# Patient Record
Sex: MALE | Race: WHITE | URBAN - METROPOLITAN AREA
[De-identification: names, ages, dates, MRNs, and addresses within clinical notes are randomized per-mention and may not be internally consistent; named-entity substitution may affect disease eponyms.]

---

## 2021-01-01 ENCOUNTER — APPOINTMENT (OUTPATIENT)
Dept: GENERAL RADIOLOGY | Age: 56
DRG: 207 | End: 2021-01-01
Payer: COMMERCIAL

## 2021-01-01 ENCOUNTER — CLINICAL DOCUMENTATION (OUTPATIENT)
Dept: PALLATIVE CARE | Age: 56
End: 2021-01-01

## 2021-01-01 ENCOUNTER — APPOINTMENT (OUTPATIENT)
Dept: ULTRASOUND IMAGING | Age: 56
DRG: 207 | End: 2021-01-01
Payer: COMMERCIAL

## 2021-01-01 ENCOUNTER — HOSPITAL ENCOUNTER (INPATIENT)
Age: 56
LOS: 24 days | DRG: 207 | End: 2021-09-03
Attending: INTERNAL MEDICINE
Payer: COMMERCIAL

## 2021-01-01 ENCOUNTER — APPOINTMENT (OUTPATIENT)
Dept: CT IMAGING | Age: 56
DRG: 207 | End: 2021-01-01
Payer: COMMERCIAL

## 2021-01-01 VITALS
TEMPERATURE: 100.4 F | BODY MASS INDEX: 33.5 KG/M2 | RESPIRATION RATE: 20 BRPM | HEART RATE: 90 BPM | OXYGEN SATURATION: 99 % | HEIGHT: 72 IN | WEIGHT: 247.36 LBS | SYSTOLIC BLOOD PRESSURE: 107 MMHG | DIASTOLIC BLOOD PRESSURE: 56 MMHG

## 2021-01-01 DIAGNOSIS — E11.9 NEW ONSET TYPE 2 DIABETES MELLITUS (HCC): ICD-10-CM

## 2021-01-01 DIAGNOSIS — R09.02 HYPOXIA: Primary | ICD-10-CM

## 2021-01-01 DIAGNOSIS — U07.1 COVID-19: ICD-10-CM

## 2021-01-01 DIAGNOSIS — J96.01 ACUTE RESPIRATORY FAILURE WITH HYPOXIA (HCC): ICD-10-CM

## 2021-01-01 LAB
ALBUMIN SERPL-MCNC: 2.1 G/DL (ref 3.5–4.6)
ALBUMIN SERPL-MCNC: 2.2 G/DL (ref 3.5–4.6)
ALBUMIN SERPL-MCNC: 2.2 G/DL (ref 3.5–4.6)
ALBUMIN SERPL-MCNC: 2.3 G/DL (ref 3.5–4.6)
ALBUMIN SERPL-MCNC: 2.3 G/DL (ref 3.5–4.6)
ALBUMIN SERPL-MCNC: 2.4 G/DL (ref 3.5–4.6)
ALBUMIN SERPL-MCNC: 2.4 G/DL (ref 3.5–4.6)
ALBUMIN SERPL-MCNC: 2.5 G/DL (ref 3.5–4.6)
ALBUMIN SERPL-MCNC: 2.5 G/DL (ref 3.5–4.6)
ALBUMIN SERPL-MCNC: 2.7 G/DL (ref 3.5–4.6)
ALBUMIN SERPL-MCNC: 2.8 G/DL (ref 3.5–4.6)
ALBUMIN SERPL-MCNC: 2.9 G/DL (ref 3.5–4.6)
ALBUMIN SERPL-MCNC: 3 G/DL (ref 3.5–4.6)
ALBUMIN SERPL-MCNC: 3.1 G/DL (ref 3.5–4.6)
ALBUMIN SERPL-MCNC: 3.1 G/DL (ref 3.5–4.6)
ALBUMIN SERPL-MCNC: 3.2 G/DL (ref 3.5–4.6)
ALBUMIN SERPL-MCNC: 3.2 G/DL (ref 3.5–4.6)
ALBUMIN SERPL-MCNC: 3.3 G/DL (ref 3.5–4.6)
ALBUMIN SERPL-MCNC: 3.4 G/DL (ref 3.5–4.6)
ALBUMIN SERPL-MCNC: 3.7 G/DL (ref 3.5–4.6)
ALP BLD-CCNC: 105 U/L (ref 35–104)
ALP BLD-CCNC: 106 U/L (ref 35–104)
ALP BLD-CCNC: 107 U/L (ref 35–104)
ALP BLD-CCNC: 107 U/L (ref 35–104)
ALP BLD-CCNC: 109 U/L (ref 35–104)
ALP BLD-CCNC: 109 U/L (ref 35–104)
ALP BLD-CCNC: 110 U/L (ref 35–104)
ALP BLD-CCNC: 111 U/L (ref 35–104)
ALP BLD-CCNC: 114 U/L (ref 35–104)
ALP BLD-CCNC: 118 U/L (ref 35–104)
ALP BLD-CCNC: 121 U/L (ref 35–104)
ALP BLD-CCNC: 124 U/L (ref 35–104)
ALP BLD-CCNC: 125 U/L (ref 35–104)
ALP BLD-CCNC: 129 U/L (ref 35–104)
ALP BLD-CCNC: 133 U/L (ref 35–104)
ALP BLD-CCNC: 137 U/L (ref 35–104)
ALP BLD-CCNC: 146 U/L (ref 35–104)
ALP BLD-CCNC: 149 U/L (ref 35–104)
ALP BLD-CCNC: 150 U/L (ref 35–104)
ALP BLD-CCNC: 170 U/L (ref 35–104)
ALP BLD-CCNC: 183 U/L (ref 35–104)
ALP BLD-CCNC: 190 U/L (ref 35–104)
ALP BLD-CCNC: 218 U/L (ref 35–104)
ALP BLD-CCNC: 230 U/L (ref 35–104)
ALP BLD-CCNC: 90 U/L (ref 35–104)
ALT SERPL-CCNC: 13 U/L (ref 0–41)
ALT SERPL-CCNC: 14 U/L (ref 0–41)
ALT SERPL-CCNC: 15 U/L (ref 0–41)
ALT SERPL-CCNC: 15 U/L (ref 0–41)
ALT SERPL-CCNC: 16 U/L (ref 0–41)
ALT SERPL-CCNC: 16 U/L (ref 0–41)
ALT SERPL-CCNC: 20 U/L (ref 0–41)
ALT SERPL-CCNC: 21 U/L (ref 0–41)
ALT SERPL-CCNC: 23 U/L (ref 0–41)
ALT SERPL-CCNC: 24 U/L (ref 0–41)
ALT SERPL-CCNC: 25 U/L (ref 0–41)
ALT SERPL-CCNC: 26 U/L (ref 0–41)
ALT SERPL-CCNC: 27 U/L (ref 0–41)
ALT SERPL-CCNC: 27 U/L (ref 0–41)
ALT SERPL-CCNC: 28 U/L (ref 0–41)
ALT SERPL-CCNC: 29 U/L (ref 0–41)
ALT SERPL-CCNC: 29 U/L (ref 0–41)
ALT SERPL-CCNC: 31 U/L (ref 0–41)
ALT SERPL-CCNC: 32 U/L (ref 0–41)
ALT SERPL-CCNC: 34 U/L (ref 0–41)
ALT SERPL-CCNC: 35 U/L (ref 0–41)
ANION GAP SERPL CALCULATED.3IONS-SCNC: 10 MEQ/L (ref 9–15)
ANION GAP SERPL CALCULATED.3IONS-SCNC: 10 MEQ/L (ref 9–15)
ANION GAP SERPL CALCULATED.3IONS-SCNC: 11 MEQ/L (ref 9–15)
ANION GAP SERPL CALCULATED.3IONS-SCNC: 11 MEQ/L (ref 9–15)
ANION GAP SERPL CALCULATED.3IONS-SCNC: 12 MEQ/L (ref 9–15)
ANION GAP SERPL CALCULATED.3IONS-SCNC: 13 MEQ/L (ref 9–15)
ANION GAP SERPL CALCULATED.3IONS-SCNC: 14 MEQ/L (ref 9–15)
ANION GAP SERPL CALCULATED.3IONS-SCNC: 15 MEQ/L (ref 9–15)
ANION GAP SERPL CALCULATED.3IONS-SCNC: 16 MEQ/L (ref 9–15)
ANION GAP SERPL CALCULATED.3IONS-SCNC: 17 MEQ/L (ref 9–15)
ANION GAP SERPL CALCULATED.3IONS-SCNC: 24 MEQ/L (ref 9–15)
ANION GAP SERPL CALCULATED.3IONS-SCNC: 27 MEQ/L (ref 9–15)
ANION GAP SERPL CALCULATED.3IONS-SCNC: 8 MEQ/L (ref 9–15)
ANION GAP SERPL CALCULATED.3IONS-SCNC: 9 MEQ/L (ref 9–15)
AST SERPL-CCNC: 11 U/L (ref 0–40)
AST SERPL-CCNC: 14 U/L (ref 0–40)
AST SERPL-CCNC: 17 U/L (ref 0–40)
AST SERPL-CCNC: 20 U/L (ref 0–40)
AST SERPL-CCNC: 20 U/L (ref 0–40)
AST SERPL-CCNC: 21 U/L (ref 0–40)
AST SERPL-CCNC: 21 U/L (ref 0–40)
AST SERPL-CCNC: 24 U/L (ref 0–40)
AST SERPL-CCNC: 24 U/L (ref 0–40)
AST SERPL-CCNC: 25 U/L (ref 0–40)
AST SERPL-CCNC: 25 U/L (ref 0–40)
AST SERPL-CCNC: 26 U/L (ref 0–40)
AST SERPL-CCNC: 28 U/L (ref 0–40)
AST SERPL-CCNC: 33 U/L (ref 0–40)
AST SERPL-CCNC: 33 U/L (ref 0–40)
AST SERPL-CCNC: 34 U/L (ref 0–40)
AST SERPL-CCNC: 35 U/L (ref 0–40)
AST SERPL-CCNC: 36 U/L (ref 0–40)
AST SERPL-CCNC: 37 U/L (ref 0–40)
AST SERPL-CCNC: 38 U/L (ref 0–40)
AST SERPL-CCNC: 40 U/L (ref 0–40)
BASE EXCESS ARTERIAL: -18 (ref -3–3)
BASE EXCESS ARTERIAL: -19 (ref -3–3)
BASE EXCESS ARTERIAL: 0 (ref -3–3)
BASE EXCESS ARTERIAL: 0 (ref -3–3)
BASE EXCESS ARTERIAL: 10 (ref -3–3)
BASE EXCESS ARTERIAL: 11 (ref -3–3)
BASE EXCESS ARTERIAL: 3 (ref -3–3)
BASE EXCESS ARTERIAL: 4 (ref -3–3)
BASE EXCESS ARTERIAL: 5 (ref -3–3)
BASE EXCESS ARTERIAL: 6 (ref -3–3)
BASE EXCESS ARTERIAL: 6 (ref -3–3)
BASE EXCESS ARTERIAL: 7 (ref -3–3)
BASE EXCESS ARTERIAL: 7 (ref -3–3)
BASE EXCESS ARTERIAL: 8 (ref -3–3)
BASE EXCESS ARTERIAL: 9 (ref -3–3)
BASOPHILS ABSOLUTE: 0 K/UL (ref 0–0.2)
BASOPHILS ABSOLUTE: 0.1 K/UL (ref 0–0.2)
BASOPHILS ABSOLUTE: 0.2 K/UL (ref 0–0.2)
BASOPHILS RELATIVE PERCENT: 0.1 %
BASOPHILS RELATIVE PERCENT: 0.2 %
BASOPHILS RELATIVE PERCENT: 0.3 %
BASOPHILS RELATIVE PERCENT: 0.4 %
BASOPHILS RELATIVE PERCENT: 0.5 %
BASOPHILS RELATIVE PERCENT: 0.6 %
BASOPHILS RELATIVE PERCENT: 0.7 %
BASOPHILS RELATIVE PERCENT: 0.9 %
BASOPHILS RELATIVE PERCENT: 0.9 %
BASOPHILS RELATIVE PERCENT: 1 %
BASOPHILS RELATIVE PERCENT: 1.3 %
BILIRUB SERPL-MCNC: 0.3 MG/DL (ref 0.2–0.7)
BILIRUB SERPL-MCNC: 0.4 MG/DL (ref 0.2–0.7)
BILIRUB SERPL-MCNC: 0.5 MG/DL (ref 0.2–0.7)
BILIRUB SERPL-MCNC: <0.2 MG/DL (ref 0.2–0.7)
BILIRUB SERPL-MCNC: <0.2 MG/DL (ref 0.2–0.7)
BLOOD CULTURE, ROUTINE: NORMAL
BUN BLDV-MCNC: 13 MG/DL (ref 6–20)
BUN BLDV-MCNC: 15 MG/DL (ref 6–20)
BUN BLDV-MCNC: 16 MG/DL (ref 6–20)
BUN BLDV-MCNC: 16 MG/DL (ref 6–20)
BUN BLDV-MCNC: 17 MG/DL (ref 6–20)
BUN BLDV-MCNC: 18 MG/DL (ref 6–20)
BUN BLDV-MCNC: 19 MG/DL (ref 6–20)
BUN BLDV-MCNC: 20 MG/DL (ref 6–20)
BUN BLDV-MCNC: 20 MG/DL (ref 6–20)
BUN BLDV-MCNC: 21 MG/DL (ref 6–20)
BUN BLDV-MCNC: 21 MG/DL (ref 6–20)
BUN BLDV-MCNC: 22 MG/DL (ref 6–20)
BUN BLDV-MCNC: 23 MG/DL (ref 6–20)
BUN BLDV-MCNC: 24 MG/DL (ref 6–20)
BUN BLDV-MCNC: 29 MG/DL (ref 6–20)
BUN BLDV-MCNC: 31 MG/DL (ref 6–20)
BUN BLDV-MCNC: 31 MG/DL (ref 6–20)
BUN BLDV-MCNC: 35 MG/DL (ref 6–20)
BUN BLDV-MCNC: 37 MG/DL (ref 6–20)
BUN BLDV-MCNC: 39 MG/DL (ref 6–20)
BUN BLDV-MCNC: 40 MG/DL (ref 6–20)
C-REACTIVE PROTEIN: 266 MG/L (ref 0–5)
CALCIUM IONIZED: 1.09 MMOL/L (ref 1.12–1.32)
CALCIUM IONIZED: 1.09 MMOL/L (ref 1.12–1.32)
CALCIUM IONIZED: 1.11 MMOL/L (ref 1.12–1.32)
CALCIUM IONIZED: 1.13 MMOL/L (ref 1.12–1.32)
CALCIUM IONIZED: 1.13 MMOL/L (ref 1.12–1.32)
CALCIUM IONIZED: 1.14 MMOL/L (ref 1.12–1.32)
CALCIUM IONIZED: 1.14 MMOL/L (ref 1.12–1.32)
CALCIUM IONIZED: 1.15 MMOL/L (ref 1.12–1.32)
CALCIUM IONIZED: 1.16 MMOL/L (ref 1.12–1.32)
CALCIUM IONIZED: 1.16 MMOL/L (ref 1.12–1.32)
CALCIUM IONIZED: 1.17 MMOL/L (ref 1.12–1.32)
CALCIUM IONIZED: 1.18 MMOL/L (ref 1.12–1.32)
CALCIUM IONIZED: 1.18 MMOL/L (ref 1.12–1.32)
CALCIUM IONIZED: 1.19 MMOL/L (ref 1.12–1.32)
CALCIUM IONIZED: 1.2 MMOL/L (ref 1.12–1.32)
CALCIUM IONIZED: 1.21 MMOL/L (ref 1.12–1.32)
CALCIUM IONIZED: 1.23 MMOL/L (ref 1.12–1.32)
CALCIUM IONIZED: 1.23 MMOL/L (ref 1.12–1.32)
CALCIUM IONIZED: 1.24 MMOL/L (ref 1.12–1.32)
CALCIUM IONIZED: 1.24 MMOL/L (ref 1.12–1.32)
CALCIUM IONIZED: 1.25 MMOL/L (ref 1.12–1.32)
CALCIUM IONIZED: 1.26 MMOL/L (ref 1.12–1.32)
CALCIUM IONIZED: 1.26 MMOL/L (ref 1.12–1.32)
CALCIUM IONIZED: 1.27 MMOL/L (ref 1.12–1.32)
CALCIUM IONIZED: 1.32 MMOL/L (ref 1.12–1.32)
CALCIUM SERPL-MCNC: 8.2 MG/DL (ref 8.5–9.9)
CALCIUM SERPL-MCNC: 8.3 MG/DL (ref 8.5–9.9)
CALCIUM SERPL-MCNC: 8.4 MG/DL (ref 8.5–9.9)
CALCIUM SERPL-MCNC: 8.5 MG/DL (ref 8.5–9.9)
CALCIUM SERPL-MCNC: 8.6 MG/DL (ref 8.5–9.9)
CALCIUM SERPL-MCNC: 8.6 MG/DL (ref 8.5–9.9)
CALCIUM SERPL-MCNC: 8.7 MG/DL (ref 8.5–9.9)
CALCIUM SERPL-MCNC: 8.8 MG/DL (ref 8.5–9.9)
CALCIUM SERPL-MCNC: 8.8 MG/DL (ref 8.5–9.9)
CALCIUM SERPL-MCNC: 8.9 MG/DL (ref 8.5–9.9)
CALCIUM SERPL-MCNC: 9 MG/DL (ref 8.5–9.9)
CALCIUM SERPL-MCNC: 9.4 MG/DL (ref 8.5–9.9)
CALCIUM SERPL-MCNC: 9.4 MG/DL (ref 8.5–9.9)
CHLORIDE BLD-SCNC: 100 MEQ/L (ref 95–107)
CHLORIDE BLD-SCNC: 101 MEQ/L (ref 95–107)
CHLORIDE BLD-SCNC: 101 MEQ/L (ref 95–107)
CHLORIDE BLD-SCNC: 102 MEQ/L (ref 95–107)
CHLORIDE BLD-SCNC: 104 MEQ/L (ref 95–107)
CHLORIDE BLD-SCNC: 104 MEQ/L (ref 95–107)
CHLORIDE BLD-SCNC: 106 MEQ/L (ref 95–107)
CHLORIDE BLD-SCNC: 108 MEQ/L (ref 95–107)
CHLORIDE BLD-SCNC: 112 MEQ/L (ref 95–107)
CHLORIDE BLD-SCNC: 112 MEQ/L (ref 95–107)
CHLORIDE BLD-SCNC: 93 MEQ/L (ref 95–107)
CHLORIDE BLD-SCNC: 96 MEQ/L (ref 95–107)
CHLORIDE BLD-SCNC: 97 MEQ/L (ref 95–107)
CHLORIDE BLD-SCNC: 97 MEQ/L (ref 95–107)
CHLORIDE BLD-SCNC: 98 MEQ/L (ref 95–107)
CHLORIDE BLD-SCNC: 99 MEQ/L (ref 95–107)
CO2: 11 MEQ/L (ref 20–31)
CO2: 11 MEQ/L (ref 20–31)
CO2: 18 MEQ/L (ref 20–31)
CO2: 19 MEQ/L (ref 20–31)
CO2: 22 MEQ/L (ref 20–31)
CO2: 23 MEQ/L (ref 20–31)
CO2: 24 MEQ/L (ref 20–31)
CO2: 24 MEQ/L (ref 20–31)
CO2: 25 MEQ/L (ref 20–31)
CO2: 27 MEQ/L (ref 20–31)
CO2: 27 MEQ/L (ref 20–31)
CO2: 29 MEQ/L (ref 20–31)
CO2: 31 MEQ/L (ref 20–31)
CO2: 32 MEQ/L (ref 20–31)
CO2: 32 MEQ/L (ref 20–31)
CO2: 33 MEQ/L (ref 20–31)
CO2: 33 MEQ/L (ref 20–31)
CO2: 35 MEQ/L (ref 20–31)
CREAT SERPL-MCNC: 0.55 MG/DL (ref 0.7–1.2)
CREAT SERPL-MCNC: 0.56 MG/DL (ref 0.7–1.2)
CREAT SERPL-MCNC: 0.57 MG/DL (ref 0.7–1.2)
CREAT SERPL-MCNC: 0.59 MG/DL (ref 0.7–1.2)
CREAT SERPL-MCNC: 0.63 MG/DL (ref 0.7–1.2)
CREAT SERPL-MCNC: 0.64 MG/DL (ref 0.7–1.2)
CREAT SERPL-MCNC: 0.72 MG/DL (ref 0.7–1.2)
CREAT SERPL-MCNC: 0.73 MG/DL (ref 0.7–1.2)
CREAT SERPL-MCNC: 0.73 MG/DL (ref 0.7–1.2)
CREAT SERPL-MCNC: 0.74 MG/DL (ref 0.7–1.2)
CREAT SERPL-MCNC: 0.74 MG/DL (ref 0.7–1.2)
CREAT SERPL-MCNC: 0.75 MG/DL (ref 0.7–1.2)
CREAT SERPL-MCNC: 0.75 MG/DL (ref 0.7–1.2)
CREAT SERPL-MCNC: 0.76 MG/DL (ref 0.7–1.2)
CREAT SERPL-MCNC: 0.8 MG/DL (ref 0.7–1.2)
CREAT SERPL-MCNC: 0.81 MG/DL (ref 0.7–1.2)
CREAT SERPL-MCNC: 0.85 MG/DL (ref 0.7–1.2)
CREAT SERPL-MCNC: 0.85 MG/DL (ref 0.7–1.2)
CREAT SERPL-MCNC: 0.87 MG/DL (ref 0.7–1.2)
CREAT SERPL-MCNC: 0.88 MG/DL (ref 0.7–1.2)
CREAT SERPL-MCNC: 0.97 MG/DL (ref 0.7–1.2)
CREAT SERPL-MCNC: 0.97 MG/DL (ref 0.7–1.2)
CREAT SERPL-MCNC: 0.98 MG/DL (ref 0.7–1.2)
CREAT SERPL-MCNC: 0.98 MG/DL (ref 0.7–1.2)
CREAT SERPL-MCNC: 1.06 MG/DL (ref 0.7–1.2)
CREAT SERPL-MCNC: 1.16 MG/DL (ref 0.7–1.2)
CREAT SERPL-MCNC: 1.34 MG/DL (ref 0.7–1.2)
CULTURE, BLOOD 2: ABNORMAL
CULTURE, BLOOD 2: NORMAL
CULTURE, BLOOD 2: NORMAL
CULTURE, RESPIRATORY: ABNORMAL
D DIMER: 0.91 MG/L FEU (ref 0–0.5)
EKG ATRIAL RATE: 110 BPM
EKG ATRIAL RATE: 42 BPM
EKG P AXIS: 29 DEGREES
EKG P AXIS: 42 DEGREES
EKG P-R INTERVAL: 168 MS
EKG P-R INTERVAL: 168 MS
EKG Q-T INTERVAL: 350 MS
EKG Q-T INTERVAL: 532 MS
EKG QRS DURATION: 92 MS
EKG QRS DURATION: 98 MS
EKG QTC CALCULATION (BAZETT): 444 MS
EKG QTC CALCULATION (BAZETT): 473 MS
EKG R AXIS: -18 DEGREES
EKG R AXIS: 11 DEGREES
EKG T AXIS: -10 DEGREES
EKG T AXIS: 57 DEGREES
EKG VENTRICULAR RATE: 110 BPM
EKG VENTRICULAR RATE: 42 BPM
EOSINOPHILS ABSOLUTE: 0 K/UL (ref 0–0.7)
EOSINOPHILS ABSOLUTE: 0.1 K/UL (ref 0–0.7)
EOSINOPHILS ABSOLUTE: 0.2 K/UL (ref 0–0.7)
EOSINOPHILS ABSOLUTE: 0.3 K/UL (ref 0–0.7)
EOSINOPHILS ABSOLUTE: 0.4 K/UL (ref 0–0.7)
EOSINOPHILS RELATIVE PERCENT: 0 %
EOSINOPHILS RELATIVE PERCENT: 0.1 %
EOSINOPHILS RELATIVE PERCENT: 0.2 %
EOSINOPHILS RELATIVE PERCENT: 0.3 %
EOSINOPHILS RELATIVE PERCENT: 0.4 %
EOSINOPHILS RELATIVE PERCENT: 0.5 %
EOSINOPHILS RELATIVE PERCENT: 0.5 %
EOSINOPHILS RELATIVE PERCENT: 0.6 %
EOSINOPHILS RELATIVE PERCENT: 1.6 %
EOSINOPHILS RELATIVE PERCENT: 2 %
EOSINOPHILS RELATIVE PERCENT: 2.2 %
EOSINOPHILS RELATIVE PERCENT: 2.7 %
EOSINOPHILS RELATIVE PERCENT: 3.4 %
EOSINOPHILS RELATIVE PERCENT: 4 %
EOSINOPHILS RELATIVE PERCENT: 4.3 %
EOSINOPHILS RELATIVE PERCENT: 5.4 %
EOSINOPHILS RELATIVE PERCENT: 6 %
FERRITIN: 1011 NG/ML (ref 30–400)
FIBRINOGEN: 804 MG/DL (ref 235–507)
GFR AFRICAN AMERICAN: 47
GFR AFRICAN AMERICAN: 54
GFR AFRICAN AMERICAN: 58
GFR AFRICAN AMERICAN: 58
GFR AFRICAN AMERICAN: >60
GFR NON-AFRICAN AMERICAN: 39
GFR NON-AFRICAN AMERICAN: 45
GFR NON-AFRICAN AMERICAN: 48
GFR NON-AFRICAN AMERICAN: 48
GFR NON-AFRICAN AMERICAN: 52
GFR NON-AFRICAN AMERICAN: 54.8
GFR NON-AFRICAN AMERICAN: 57
GFR NON-AFRICAN AMERICAN: >60
GLOBULIN: 2.6 G/DL (ref 2.3–3.5)
GLOBULIN: 2.8 G/DL (ref 2.3–3.5)
GLOBULIN: 2.8 G/DL (ref 2.3–3.5)
GLOBULIN: 2.9 G/DL (ref 2.3–3.5)
GLOBULIN: 3 G/DL (ref 2.3–3.5)
GLOBULIN: 3.1 G/DL (ref 2.3–3.5)
GLOBULIN: 3.2 G/DL (ref 2.3–3.5)
GLOBULIN: 3.3 G/DL (ref 2.3–3.5)
GLOBULIN: 3.4 G/DL (ref 2.3–3.5)
GLOBULIN: 3.4 G/DL (ref 2.3–3.5)
GLOBULIN: 3.5 G/DL (ref 2.3–3.5)
GLOBULIN: 3.5 G/DL (ref 2.3–3.5)
GLOBULIN: 3.6 G/DL (ref 2.3–3.5)
GLOBULIN: 3.7 G/DL (ref 2.3–3.5)
GLOBULIN: 3.7 G/DL (ref 2.3–3.5)
GLOBULIN: 3.8 G/DL (ref 2.3–3.5)
GLOBULIN: 3.8 G/DL (ref 2.3–3.5)
GLOBULIN: 3.9 G/DL (ref 2.3–3.5)
GLUCOSE BLD-MCNC: 100 MG/DL (ref 70–99)
GLUCOSE BLD-MCNC: 101 MG/DL (ref 60–115)
GLUCOSE BLD-MCNC: 102 MG/DL (ref 60–115)
GLUCOSE BLD-MCNC: 102 MG/DL (ref 60–115)
GLUCOSE BLD-MCNC: 106 MG/DL (ref 60–115)
GLUCOSE BLD-MCNC: 108 MG/DL (ref 60–115)
GLUCOSE BLD-MCNC: 109 MG/DL (ref 60–115)
GLUCOSE BLD-MCNC: 110 MG/DL (ref 60–115)
GLUCOSE BLD-MCNC: 110 MG/DL (ref 70–99)
GLUCOSE BLD-MCNC: 110 MG/DL (ref 70–99)
GLUCOSE BLD-MCNC: 111 MG/DL (ref 60–115)
GLUCOSE BLD-MCNC: 111 MG/DL (ref 60–115)
GLUCOSE BLD-MCNC: 112 MG/DL (ref 60–115)
GLUCOSE BLD-MCNC: 113 MG/DL (ref 70–99)
GLUCOSE BLD-MCNC: 114 MG/DL (ref 60–115)
GLUCOSE BLD-MCNC: 114 MG/DL (ref 60–115)
GLUCOSE BLD-MCNC: 118 MG/DL (ref 60–115)
GLUCOSE BLD-MCNC: 120 MG/DL (ref 60–115)
GLUCOSE BLD-MCNC: 124 MG/DL (ref 60–115)
GLUCOSE BLD-MCNC: 128 MG/DL (ref 60–115)
GLUCOSE BLD-MCNC: 130 MG/DL (ref 60–115)
GLUCOSE BLD-MCNC: 130 MG/DL (ref 70–99)
GLUCOSE BLD-MCNC: 133 MG/DL (ref 60–115)
GLUCOSE BLD-MCNC: 133 MG/DL (ref 60–115)
GLUCOSE BLD-MCNC: 134 MG/DL (ref 70–99)
GLUCOSE BLD-MCNC: 135 MG/DL (ref 60–115)
GLUCOSE BLD-MCNC: 138 MG/DL (ref 60–115)
GLUCOSE BLD-MCNC: 139 MG/DL (ref 60–115)
GLUCOSE BLD-MCNC: 145 MG/DL (ref 60–115)
GLUCOSE BLD-MCNC: 147 MG/DL (ref 60–115)
GLUCOSE BLD-MCNC: 149 MG/DL (ref 60–115)
GLUCOSE BLD-MCNC: 149 MG/DL (ref 60–115)
GLUCOSE BLD-MCNC: 151 MG/DL (ref 60–115)
GLUCOSE BLD-MCNC: 152 MG/DL (ref 60–115)
GLUCOSE BLD-MCNC: 155 MG/DL (ref 60–115)
GLUCOSE BLD-MCNC: 155 MG/DL (ref 60–115)
GLUCOSE BLD-MCNC: 158 MG/DL (ref 60–115)
GLUCOSE BLD-MCNC: 160 MG/DL (ref 70–99)
GLUCOSE BLD-MCNC: 161 MG/DL (ref 60–115)
GLUCOSE BLD-MCNC: 161 MG/DL (ref 60–115)
GLUCOSE BLD-MCNC: 163 MG/DL (ref 70–99)
GLUCOSE BLD-MCNC: 164 MG/DL (ref 60–115)
GLUCOSE BLD-MCNC: 165 MG/DL (ref 60–115)
GLUCOSE BLD-MCNC: 165 MG/DL (ref 60–115)
GLUCOSE BLD-MCNC: 166 MG/DL (ref 60–115)
GLUCOSE BLD-MCNC: 167 MG/DL (ref 60–115)
GLUCOSE BLD-MCNC: 167 MG/DL (ref 60–115)
GLUCOSE BLD-MCNC: 170 MG/DL (ref 60–115)
GLUCOSE BLD-MCNC: 170 MG/DL (ref 70–99)
GLUCOSE BLD-MCNC: 171 MG/DL (ref 60–115)
GLUCOSE BLD-MCNC: 172 MG/DL (ref 70–99)
GLUCOSE BLD-MCNC: 174 MG/DL (ref 60–115)
GLUCOSE BLD-MCNC: 176 MG/DL (ref 60–115)
GLUCOSE BLD-MCNC: 178 MG/DL (ref 60–115)
GLUCOSE BLD-MCNC: 178 MG/DL (ref 60–115)
GLUCOSE BLD-MCNC: 180 MG/DL (ref 60–115)
GLUCOSE BLD-MCNC: 180 MG/DL (ref 70–99)
GLUCOSE BLD-MCNC: 181 MG/DL (ref 60–115)
GLUCOSE BLD-MCNC: 182 MG/DL (ref 60–115)
GLUCOSE BLD-MCNC: 182 MG/DL (ref 60–115)
GLUCOSE BLD-MCNC: 183 MG/DL (ref 70–99)
GLUCOSE BLD-MCNC: 185 MG/DL (ref 60–115)
GLUCOSE BLD-MCNC: 186 MG/DL (ref 60–115)
GLUCOSE BLD-MCNC: 187 MG/DL (ref 60–115)
GLUCOSE BLD-MCNC: 188 MG/DL (ref 60–115)
GLUCOSE BLD-MCNC: 190 MG/DL (ref 60–115)
GLUCOSE BLD-MCNC: 190 MG/DL (ref 60–115)
GLUCOSE BLD-MCNC: 191 MG/DL (ref 60–115)
GLUCOSE BLD-MCNC: 203 MG/DL (ref 60–115)
GLUCOSE BLD-MCNC: 205 MG/DL (ref 60–115)
GLUCOSE BLD-MCNC: 206 MG/DL (ref 60–115)
GLUCOSE BLD-MCNC: 207 MG/DL (ref 60–115)
GLUCOSE BLD-MCNC: 209 MG/DL (ref 60–115)
GLUCOSE BLD-MCNC: 211 MG/DL (ref 60–115)
GLUCOSE BLD-MCNC: 211 MG/DL (ref 70–99)
GLUCOSE BLD-MCNC: 214 MG/DL (ref 60–115)
GLUCOSE BLD-MCNC: 218 MG/DL (ref 60–115)
GLUCOSE BLD-MCNC: 218 MG/DL (ref 60–115)
GLUCOSE BLD-MCNC: 219 MG/DL (ref 60–115)
GLUCOSE BLD-MCNC: 219 MG/DL (ref 60–115)
GLUCOSE BLD-MCNC: 220 MG/DL (ref 60–115)
GLUCOSE BLD-MCNC: 223 MG/DL (ref 60–115)
GLUCOSE BLD-MCNC: 225 MG/DL (ref 60–115)
GLUCOSE BLD-MCNC: 226 MG/DL (ref 60–115)
GLUCOSE BLD-MCNC: 227 MG/DL (ref 60–115)
GLUCOSE BLD-MCNC: 227 MG/DL (ref 60–115)
GLUCOSE BLD-MCNC: 229 MG/DL (ref 60–115)
GLUCOSE BLD-MCNC: 230 MG/DL (ref 60–115)
GLUCOSE BLD-MCNC: 231 MG/DL (ref 60–115)
GLUCOSE BLD-MCNC: 236 MG/DL (ref 70–99)
GLUCOSE BLD-MCNC: 238 MG/DL (ref 70–99)
GLUCOSE BLD-MCNC: 240 MG/DL (ref 60–115)
GLUCOSE BLD-MCNC: 242 MG/DL (ref 60–115)
GLUCOSE BLD-MCNC: 242 MG/DL (ref 60–115)
GLUCOSE BLD-MCNC: 243 MG/DL (ref 60–115)
GLUCOSE BLD-MCNC: 247 MG/DL (ref 60–115)
GLUCOSE BLD-MCNC: 248 MG/DL (ref 60–115)
GLUCOSE BLD-MCNC: 250 MG/DL (ref 60–115)
GLUCOSE BLD-MCNC: 250 MG/DL (ref 60–115)
GLUCOSE BLD-MCNC: 251 MG/DL (ref 60–115)
GLUCOSE BLD-MCNC: 251 MG/DL (ref 70–99)
GLUCOSE BLD-MCNC: 253 MG/DL (ref 60–115)
GLUCOSE BLD-MCNC: 256 MG/DL (ref 60–115)
GLUCOSE BLD-MCNC: 258 MG/DL (ref 60–115)
GLUCOSE BLD-MCNC: 259 MG/DL (ref 70–99)
GLUCOSE BLD-MCNC: 259 MG/DL (ref 70–99)
GLUCOSE BLD-MCNC: 260 MG/DL (ref 60–115)
GLUCOSE BLD-MCNC: 262 MG/DL (ref 60–115)
GLUCOSE BLD-MCNC: 265 MG/DL (ref 60–115)
GLUCOSE BLD-MCNC: 266 MG/DL (ref 60–115)
GLUCOSE BLD-MCNC: 267 MG/DL (ref 60–115)
GLUCOSE BLD-MCNC: 271 MG/DL (ref 60–115)
GLUCOSE BLD-MCNC: 273 MG/DL (ref 60–115)
GLUCOSE BLD-MCNC: 273 MG/DL (ref 60–115)
GLUCOSE BLD-MCNC: 274 MG/DL (ref 60–115)
GLUCOSE BLD-MCNC: 277 MG/DL (ref 60–115)
GLUCOSE BLD-MCNC: 277 MG/DL (ref 60–115)
GLUCOSE BLD-MCNC: 279 MG/DL (ref 60–115)
GLUCOSE BLD-MCNC: 279 MG/DL (ref 70–99)
GLUCOSE BLD-MCNC: 280 MG/DL (ref 70–99)
GLUCOSE BLD-MCNC: 284 MG/DL (ref 70–99)
GLUCOSE BLD-MCNC: 286 MG/DL (ref 60–115)
GLUCOSE BLD-MCNC: 286 MG/DL (ref 60–115)
GLUCOSE BLD-MCNC: 290 MG/DL (ref 60–115)
GLUCOSE BLD-MCNC: 292 MG/DL (ref 70–99)
GLUCOSE BLD-MCNC: 293 MG/DL (ref 70–99)
GLUCOSE BLD-MCNC: 297 MG/DL (ref 60–115)
GLUCOSE BLD-MCNC: 297 MG/DL (ref 60–115)
GLUCOSE BLD-MCNC: 300 MG/DL (ref 60–115)
GLUCOSE BLD-MCNC: 301 MG/DL (ref 60–115)
GLUCOSE BLD-MCNC: 301 MG/DL (ref 60–115)
GLUCOSE BLD-MCNC: 302 MG/DL (ref 60–115)
GLUCOSE BLD-MCNC: 302 MG/DL (ref 60–115)
GLUCOSE BLD-MCNC: 308 MG/DL (ref 60–115)
GLUCOSE BLD-MCNC: 308 MG/DL (ref 60–115)
GLUCOSE BLD-MCNC: 312 MG/DL (ref 60–115)
GLUCOSE BLD-MCNC: 314 MG/DL (ref 60–115)
GLUCOSE BLD-MCNC: 340 MG/DL (ref 60–115)
GLUCOSE BLD-MCNC: 343 MG/DL (ref 60–115)
GLUCOSE BLD-MCNC: 357 MG/DL (ref 60–115)
GLUCOSE BLD-MCNC: 366 MG/DL (ref 70–99)
GLUCOSE BLD-MCNC: 375 MG/DL (ref 60–115)
GLUCOSE BLD-MCNC: 378 MG/DL (ref 70–99)
GLUCOSE BLD-MCNC: 391 MG/DL (ref 60–115)
GLUCOSE BLD-MCNC: 53 MG/DL (ref 60–115)
GLUCOSE BLD-MCNC: 54 MG/DL (ref 60–115)
GLUCOSE BLD-MCNC: 62 MG/DL (ref 60–115)
GLUCOSE BLD-MCNC: 62 MG/DL (ref 60–115)
GLUCOSE BLD-MCNC: 82 MG/DL (ref 60–115)
GLUCOSE BLD-MCNC: 83 MG/DL (ref 60–115)
GLUCOSE BLD-MCNC: 87 MG/DL (ref 60–115)
GLUCOSE BLD-MCNC: 88 MG/DL (ref 60–115)
GLUCOSE BLD-MCNC: 88 MG/DL (ref 60–115)
GLUCOSE BLD-MCNC: 90 MG/DL (ref 70–99)
GLUCOSE BLD-MCNC: 93 MG/DL (ref 60–115)
GLUCOSE BLD-MCNC: 93 MG/DL (ref 60–115)
GLUCOSE BLD-MCNC: 96 MG/DL (ref 70–99)
GLUCOSE BLD-MCNC: 99 MG/DL (ref 60–115)
GRAM STAIN RESULT: ABNORMAL
GRAM STAIN RESULT: ABNORMAL
HBA1C MFR BLD: 13.7 % (ref 4.8–5.9)
HCO3 ARTERIAL: 26.7 MMOL/L (ref 21–29)
HCO3 ARTERIAL: 27.2 MMOL/L (ref 21–29)
HCO3 ARTERIAL: 27.7 MMOL/L (ref 21–29)
HCO3 ARTERIAL: 28.2 MMOL/L (ref 21–29)
HCO3 ARTERIAL: 28.2 MMOL/L (ref 21–29)
HCO3 ARTERIAL: 28.7 MMOL/L (ref 21–29)
HCO3 ARTERIAL: 29 MMOL/L (ref 21–29)
HCO3 ARTERIAL: 29.1 MMOL/L (ref 21–29)
HCO3 ARTERIAL: 29.2 MMOL/L (ref 21–29)
HCO3 ARTERIAL: 29.5 MMOL/L (ref 21–29)
HCO3 ARTERIAL: 29.8 MMOL/L (ref 21–29)
HCO3 ARTERIAL: 29.9 MMOL/L (ref 21–29)
HCO3 ARTERIAL: 29.9 MMOL/L (ref 21–29)
HCO3 ARTERIAL: 30.2 MMOL/L (ref 21–29)
HCO3 ARTERIAL: 30.6 MMOL/L (ref 21–29)
HCO3 ARTERIAL: 30.7 MMOL/L (ref 21–29)
HCO3 ARTERIAL: 31.5 MMOL/L (ref 21–29)
HCO3 ARTERIAL: 31.6 MMOL/L (ref 21–29)
HCO3 ARTERIAL: 32 MMOL/L (ref 21–29)
HCO3 ARTERIAL: 32.1 MMOL/L (ref 21–29)
HCO3 ARTERIAL: 32.1 MMOL/L (ref 21–29)
HCO3 ARTERIAL: 32.6 MMOL/L (ref 21–29)
HCO3 ARTERIAL: 32.8 MMOL/L (ref 21–29)
HCO3 ARTERIAL: 33.7 MMOL/L (ref 21–29)
HCO3 ARTERIAL: 33.8 MMOL/L (ref 21–29)
HCO3 ARTERIAL: 33.9 MMOL/L (ref 21–29)
HCO3 ARTERIAL: 34 MMOL/L (ref 21–29)
HCO3 ARTERIAL: 34 MMOL/L (ref 21–29)
HCO3 ARTERIAL: 34.1 MMOL/L (ref 21–29)
HCO3 ARTERIAL: 34.8 MMOL/L (ref 21–29)
HCO3 ARTERIAL: 34.9 MMOL/L (ref 21–29)
HCO3 ARTERIAL: 35.3 MMOL/L (ref 21–29)
HCO3 ARTERIAL: 39.1 MMOL/L (ref 21–29)
HCO3 ARTERIAL: 7.2 MMOL/L (ref 21–29)
HCO3 ARTERIAL: 8.7 MMOL/L (ref 21–29)
HCT VFR BLD CALC: 28.9 % (ref 42–52)
HCT VFR BLD CALC: 29.6 % (ref 42–52)
HCT VFR BLD CALC: 32.8 % (ref 42–52)
HCT VFR BLD CALC: 33.9 % (ref 42–52)
HCT VFR BLD CALC: 34.1 % (ref 42–52)
HCT VFR BLD CALC: 36.4 % (ref 42–52)
HCT VFR BLD CALC: 39.6 % (ref 42–52)
HCT VFR BLD CALC: 40.4 % (ref 42–52)
HCT VFR BLD CALC: 40.7 % (ref 42–52)
HCT VFR BLD CALC: 42.6 % (ref 42–52)
HCT VFR BLD CALC: 43.2 % (ref 42–52)
HCT VFR BLD CALC: 43.2 % (ref 42–52)
HCT VFR BLD CALC: 43.3 % (ref 42–52)
HCT VFR BLD CALC: 43.9 % (ref 42–52)
HCT VFR BLD CALC: 44.1 % (ref 42–52)
HCT VFR BLD CALC: 44.4 % (ref 42–52)
HCT VFR BLD CALC: 44.9 % (ref 42–52)
HCT VFR BLD CALC: 45.3 % (ref 42–52)
HCT VFR BLD CALC: 46.4 % (ref 42–52)
HCT VFR BLD CALC: 46.6 % (ref 42–52)
HCT VFR BLD CALC: 47.6 % (ref 42–52)
HCT VFR BLD CALC: 47.9 % (ref 42–52)
HCT VFR BLD CALC: 48.1 % (ref 42–52)
HCT VFR BLD CALC: 49.1 % (ref 42–52)
HCT VFR BLD CALC: 50.1 % (ref 42–52)
HEMOGLOBIN: 10.1 G/DL (ref 14–18)
HEMOGLOBIN: 10.1 G/DL (ref 14–18)
HEMOGLOBIN: 10.1 GM/DL (ref 13.5–17.5)
HEMOGLOBIN: 10.4 GM/DL (ref 13.5–17.5)
HEMOGLOBIN: 11 GM/DL (ref 13.5–17.5)
HEMOGLOBIN: 11.1 G/DL (ref 14–18)
HEMOGLOBIN: 11.1 G/DL (ref 14–18)
HEMOGLOBIN: 11.1 GM/DL (ref 13.5–17.5)
HEMOGLOBIN: 11.2 GM/DL (ref 13.5–17.5)
HEMOGLOBIN: 11.3 G/DL (ref 14–18)
HEMOGLOBIN: 11.7 GM/DL (ref 13.5–17.5)
HEMOGLOBIN: 11.8 GM/DL (ref 13.5–17.5)
HEMOGLOBIN: 12.2 G/DL (ref 14–18)
HEMOGLOBIN: 12.3 GM/DL (ref 13.5–17.5)
HEMOGLOBIN: 12.3 GM/DL (ref 13.5–17.5)
HEMOGLOBIN: 12.8 G/DL (ref 14–18)
HEMOGLOBIN: 12.8 GM/DL (ref 13.5–17.5)
HEMOGLOBIN: 13 G/DL (ref 14–18)
HEMOGLOBIN: 13 GM/DL (ref 13.5–17.5)
HEMOGLOBIN: 13 GM/DL (ref 13.5–17.5)
HEMOGLOBIN: 13.1 GM/DL (ref 13.5–17.5)
HEMOGLOBIN: 13.2 GM/DL (ref 13.5–17.5)
HEMOGLOBIN: 13.4 G/DL (ref 14–18)
HEMOGLOBIN: 13.4 GM/DL (ref 13.5–17.5)
HEMOGLOBIN: 13.5 GM/DL (ref 13.5–17.5)
HEMOGLOBIN: 13.9 GM/DL (ref 13.5–17.5)
HEMOGLOBIN: 14.1 GM/DL (ref 13.5–17.5)
HEMOGLOBIN: 14.1 GM/DL (ref 13.5–17.5)
HEMOGLOBIN: 14.2 GM/DL (ref 13.5–17.5)
HEMOGLOBIN: 14.2 GM/DL (ref 13.5–17.5)
HEMOGLOBIN: 14.3 G/DL (ref 14–18)
HEMOGLOBIN: 14.3 G/DL (ref 14–18)
HEMOGLOBIN: 14.4 G/DL (ref 14–18)
HEMOGLOBIN: 14.4 GM/DL (ref 13.5–17.5)
HEMOGLOBIN: 14.4 GM/DL (ref 13.5–17.5)
HEMOGLOBIN: 14.5 G/DL (ref 14–18)
HEMOGLOBIN: 14.5 GM/DL (ref 13.5–17.5)
HEMOGLOBIN: 14.6 GM/DL (ref 13.5–17.5)
HEMOGLOBIN: 14.7 G/DL (ref 14–18)
HEMOGLOBIN: 14.8 G/DL (ref 14–18)
HEMOGLOBIN: 14.9 G/DL (ref 14–18)
HEMOGLOBIN: 15 GM/DL (ref 13.5–17.5)
HEMOGLOBIN: 15 GM/DL (ref 13.5–17.5)
HEMOGLOBIN: 15.1 GM/DL (ref 13.5–17.5)
HEMOGLOBIN: 15.3 GM/DL (ref 13.5–17.5)
HEMOGLOBIN: 15.4 G/DL (ref 14–18)
HEMOGLOBIN: 15.4 G/DL (ref 14–18)
HEMOGLOBIN: 15.5 GM/DL (ref 13.5–17.5)
HEMOGLOBIN: 15.6 G/DL (ref 14–18)
HEMOGLOBIN: 15.6 GM/DL (ref 13.5–17.5)
HEMOGLOBIN: 15.8 G/DL (ref 14–18)
HEMOGLOBIN: 16 G/DL (ref 14–18)
HEMOGLOBIN: 16.1 G/DL (ref 14–18)
HEMOGLOBIN: 16.2 GM/DL (ref 13.5–17.5)
HEMOGLOBIN: 16.4 GM/DL (ref 13.5–17.5)
HEMOGLOBIN: 16.5 G/DL (ref 14–18)
HEMOGLOBIN: 16.9 GM/DL (ref 13.5–17.5)
HEMOGLOBIN: 17.3 GM/DL (ref 13.5–17.5)
HEMOGLOBIN: 17.5 GM/DL (ref 13.5–17.5)
HEMOGLOBIN: 17.6 GM/DL (ref 13.5–17.5)
LACTATE: 0.31 MMOL/L (ref 0.4–2)
LACTATE: 0.67 MMOL/L (ref 0.4–2)
LACTATE: 0.72 MMOL/L (ref 0.4–2)
LACTATE: 0.75 MMOL/L (ref 0.4–2)
LACTATE: 0.76 MMOL/L (ref 0.4–2)
LACTATE: 0.8 MMOL/L (ref 0.4–2)
LACTATE: 0.85 MMOL/L (ref 0.4–2)
LACTATE: 0.88 MMOL/L (ref 0.4–2)
LACTATE: 0.9 MMOL/L (ref 0.4–2)
LACTATE: 0.99 MMOL/L (ref 0.4–2)
LACTATE: 1.01 MMOL/L (ref 0.4–2)
LACTATE: 1.02 MMOL/L (ref 0.4–2)
LACTATE: 1.06 MMOL/L (ref 0.4–2)
LACTATE: 1.07 MMOL/L (ref 0.4–2)
LACTATE: 1.13 MMOL/L (ref 0.4–2)
LACTATE: 1.25 MMOL/L (ref 0.4–2)
LACTATE: 1.27 MMOL/L (ref 0.4–2)
LACTATE: 1.64 MMOL/L (ref 0.4–2)
LACTATE: 1.64 MMOL/L (ref 0.4–2)
LACTATE: 1.69 MMOL/L (ref 0.4–2)
LACTATE: 1.7 MMOL/L (ref 0.4–2)
LACTATE: 1.71 MMOL/L (ref 0.4–2)
LACTATE: 1.76 MMOL/L (ref 0.4–2)
LACTATE: 1.78 MMOL/L (ref 0.4–2)
LACTATE: 1.81 MMOL/L (ref 0.4–2)
LACTATE: 1.84 MMOL/L (ref 0.4–2)
LACTATE: 1.85 MMOL/L (ref 0.4–2)
LACTATE: 1.86 MMOL/L (ref 0.4–2)
LACTATE: 1.88 MMOL/L (ref 0.4–2)
LACTATE: 1.96 MMOL/L (ref 0.4–2)
LACTATE: 1.98 MMOL/L (ref 0.4–2)
LACTATE: 2.07 MMOL/L (ref 0.4–2)
LACTATE: 2.14 MMOL/L (ref 0.4–2)
LACTATE: 2.31 MMOL/L (ref 0.4–2)
LACTATE: 2.69 MMOL/L (ref 0.4–2)
LACTIC ACID: 2.3 MMOL/L (ref 0.5–2.2)
LV EF: 65 %
LVEF MODALITY: NORMAL
LYMPHOCYTES ABSOLUTE: 0.2 K/UL (ref 1–4.8)
LYMPHOCYTES ABSOLUTE: 0.4 K/UL (ref 1–4.8)
LYMPHOCYTES ABSOLUTE: 0.5 K/UL (ref 1–4.8)
LYMPHOCYTES ABSOLUTE: 0.6 K/UL (ref 1–4.8)
LYMPHOCYTES ABSOLUTE: 0.7 K/UL (ref 1–4.8)
LYMPHOCYTES ABSOLUTE: 0.8 K/UL (ref 1–4.8)
LYMPHOCYTES ABSOLUTE: 0.9 K/UL (ref 1–4.8)
LYMPHOCYTES ABSOLUTE: 1 K/UL (ref 1–4.8)
LYMPHOCYTES ABSOLUTE: 1 K/UL (ref 1–4.8)
LYMPHOCYTES ABSOLUTE: 1.1 K/UL (ref 1–4.8)
LYMPHOCYTES ABSOLUTE: 1.1 K/UL (ref 1–4.8)
LYMPHOCYTES ABSOLUTE: 1.2 K/UL (ref 1–4.8)
LYMPHOCYTES ABSOLUTE: 1.3 K/UL (ref 1–4.8)
LYMPHOCYTES RELATIVE PERCENT: 1.5 %
LYMPHOCYTES RELATIVE PERCENT: 10.6 %
LYMPHOCYTES RELATIVE PERCENT: 10.8 %
LYMPHOCYTES RELATIVE PERCENT: 10.8 %
LYMPHOCYTES RELATIVE PERCENT: 10.9 %
LYMPHOCYTES RELATIVE PERCENT: 11.8 %
LYMPHOCYTES RELATIVE PERCENT: 12.8 %
LYMPHOCYTES RELATIVE PERCENT: 12.8 %
LYMPHOCYTES RELATIVE PERCENT: 13 %
LYMPHOCYTES RELATIVE PERCENT: 14.8 %
LYMPHOCYTES RELATIVE PERCENT: 2.8 %
LYMPHOCYTES RELATIVE PERCENT: 2.9 %
LYMPHOCYTES RELATIVE PERCENT: 3.4 %
LYMPHOCYTES RELATIVE PERCENT: 4.1 %
LYMPHOCYTES RELATIVE PERCENT: 4.5 %
LYMPHOCYTES RELATIVE PERCENT: 4.7 %
LYMPHOCYTES RELATIVE PERCENT: 5.5 %
LYMPHOCYTES RELATIVE PERCENT: 6.1 %
LYMPHOCYTES RELATIVE PERCENT: 6.3 %
LYMPHOCYTES RELATIVE PERCENT: 6.6 %
LYMPHOCYTES RELATIVE PERCENT: 8.8 %
LYMPHOCYTES RELATIVE PERCENT: 9.9 %
MAGNESIUM: 1.7 MG/DL (ref 1.7–2.4)
MAGNESIUM: 1.7 MG/DL (ref 1.7–2.4)
MAGNESIUM: 1.9 MG/DL (ref 1.7–2.4)
MAGNESIUM: 1.9 MG/DL (ref 1.7–2.4)
MAGNESIUM: 2 MG/DL (ref 1.7–2.4)
MAGNESIUM: 2 MG/DL (ref 1.7–2.4)
MAGNESIUM: 2.1 MG/DL (ref 1.7–2.4)
MAGNESIUM: 2.1 MG/DL (ref 1.7–2.4)
MAGNESIUM: 2.2 MG/DL (ref 1.7–2.4)
MAGNESIUM: 2.4 MG/DL (ref 1.7–2.4)
MCH RBC QN AUTO: 28.8 PG (ref 27–31.3)
MCH RBC QN AUTO: 28.8 PG (ref 27–31.3)
MCH RBC QN AUTO: 28.9 PG (ref 27–31.3)
MCH RBC QN AUTO: 29.1 PG (ref 27–31.3)
MCH RBC QN AUTO: 29.2 PG (ref 27–31.3)
MCH RBC QN AUTO: 29.3 PG (ref 27–31.3)
MCH RBC QN AUTO: 29.4 PG (ref 27–31.3)
MCH RBC QN AUTO: 29.6 PG (ref 27–31.3)
MCH RBC QN AUTO: 29.9 PG (ref 27–31.3)
MCH RBC QN AUTO: 29.9 PG (ref 27–31.3)
MCH RBC QN AUTO: 30 PG (ref 27–31.3)
MCH RBC QN AUTO: 30.1 PG (ref 27–31.3)
MCH RBC QN AUTO: 30.6 PG (ref 27–31.3)
MCHC RBC AUTO-ENTMCNC: 32.2 % (ref 33–37)
MCHC RBC AUTO-ENTMCNC: 32.4 % (ref 33–37)
MCHC RBC AUTO-ENTMCNC: 32.7 % (ref 33–37)
MCHC RBC AUTO-ENTMCNC: 32.8 % (ref 33–37)
MCHC RBC AUTO-ENTMCNC: 32.8 % (ref 33–37)
MCHC RBC AUTO-ENTMCNC: 32.9 % (ref 33–37)
MCHC RBC AUTO-ENTMCNC: 33 % (ref 33–37)
MCHC RBC AUTO-ENTMCNC: 33.1 % (ref 33–37)
MCHC RBC AUTO-ENTMCNC: 33.2 % (ref 33–37)
MCHC RBC AUTO-ENTMCNC: 33.2 % (ref 33–37)
MCHC RBC AUTO-ENTMCNC: 33.3 % (ref 33–37)
MCHC RBC AUTO-ENTMCNC: 33.3 % (ref 33–37)
MCHC RBC AUTO-ENTMCNC: 33.4 % (ref 33–37)
MCHC RBC AUTO-ENTMCNC: 33.5 % (ref 33–37)
MCHC RBC AUTO-ENTMCNC: 33.7 % (ref 33–37)
MCHC RBC AUTO-ENTMCNC: 33.7 % (ref 33–37)
MCHC RBC AUTO-ENTMCNC: 33.9 % (ref 33–37)
MCHC RBC AUTO-ENTMCNC: 34 % (ref 33–37)
MCHC RBC AUTO-ENTMCNC: 34.9 % (ref 33–37)
MCV RBC AUTO: 87.2 FL (ref 80–100)
MCV RBC AUTO: 87.7 FL (ref 80–100)
MCV RBC AUTO: 88 FL (ref 80–100)
MCV RBC AUTO: 88.3 FL (ref 80–100)
MCV RBC AUTO: 88.3 FL (ref 80–100)
MCV RBC AUTO: 88.5 FL (ref 80–100)
MCV RBC AUTO: 88.6 FL (ref 80–100)
MCV RBC AUTO: 88.7 FL (ref 80–100)
MCV RBC AUTO: 88.8 FL (ref 80–100)
MCV RBC AUTO: 89 FL (ref 80–100)
MCV RBC AUTO: 89 FL (ref 80–100)
MCV RBC AUTO: 89.1 FL (ref 80–100)
MCV RBC AUTO: 89.2 FL (ref 80–100)
MCV RBC AUTO: 89.3 FL (ref 80–100)
MCV RBC AUTO: 89.4 FL (ref 80–100)
MCV RBC AUTO: 89.6 FL (ref 80–100)
MCV RBC AUTO: 89.7 FL (ref 80–100)
MCV RBC AUTO: 89.8 FL (ref 80–100)
MCV RBC AUTO: 89.8 FL (ref 80–100)
MONOCYTES ABSOLUTE: 0.3 K/UL (ref 0.2–0.8)
MONOCYTES ABSOLUTE: 0.4 K/UL (ref 0.2–0.8)
MONOCYTES ABSOLUTE: 0.5 K/UL (ref 0.2–0.8)
MONOCYTES ABSOLUTE: 0.6 K/UL (ref 0.2–0.8)
MONOCYTES ABSOLUTE: 0.7 K/UL (ref 0.2–0.8)
MONOCYTES ABSOLUTE: 0.8 K/UL (ref 0.2–0.8)
MONOCYTES ABSOLUTE: 1 K/UL (ref 0.2–0.8)
MONOCYTES RELATIVE PERCENT: 10 %
MONOCYTES RELATIVE PERCENT: 2.7 %
MONOCYTES RELATIVE PERCENT: 2.8 %
MONOCYTES RELATIVE PERCENT: 3.4 %
MONOCYTES RELATIVE PERCENT: 3.4 %
MONOCYTES RELATIVE PERCENT: 3.7 %
MONOCYTES RELATIVE PERCENT: 3.8 %
MONOCYTES RELATIVE PERCENT: 4.8 %
MONOCYTES RELATIVE PERCENT: 5 %
MONOCYTES RELATIVE PERCENT: 5.1 %
MONOCYTES RELATIVE PERCENT: 5.2 %
MONOCYTES RELATIVE PERCENT: 5.4 %
MONOCYTES RELATIVE PERCENT: 6.3 %
MONOCYTES RELATIVE PERCENT: 6.8 %
MONOCYTES RELATIVE PERCENT: 6.9 %
MONOCYTES RELATIVE PERCENT: 6.9 %
MONOCYTES RELATIVE PERCENT: 7.1 %
MONOCYTES RELATIVE PERCENT: 7.8 %
MONOCYTES RELATIVE PERCENT: 8.3 %
MONOCYTES RELATIVE PERCENT: 8.4 %
MONOCYTES RELATIVE PERCENT: 8.9 %
MONOCYTES RELATIVE PERCENT: 8.9 %
MONOCYTES RELATIVE PERCENT: 9.1 %
MONOCYTES RELATIVE PERCENT: 9.1 %
NEUTROPHILS ABSOLUTE: 11 K/UL (ref 1.4–6.5)
NEUTROPHILS ABSOLUTE: 11 K/UL (ref 1.4–6.5)
NEUTROPHILS ABSOLUTE: 11.8 K/UL (ref 1.4–6.5)
NEUTROPHILS ABSOLUTE: 12.6 K/UL (ref 1.4–6.5)
NEUTROPHILS ABSOLUTE: 12.7 K/UL (ref 1.4–6.5)
NEUTROPHILS ABSOLUTE: 15.9 K/UL (ref 1.4–6.5)
NEUTROPHILS ABSOLUTE: 15.9 K/UL (ref 1.4–6.5)
NEUTROPHILS ABSOLUTE: 17.6 K/UL (ref 1.4–6.5)
NEUTROPHILS ABSOLUTE: 17.7 K/UL (ref 1.4–6.5)
NEUTROPHILS ABSOLUTE: 18.3 K/UL (ref 1.4–6.5)
NEUTROPHILS ABSOLUTE: 4.2 K/UL (ref 1.4–6.5)
NEUTROPHILS ABSOLUTE: 4.3 K/UL (ref 1.4–6.5)
NEUTROPHILS ABSOLUTE: 5.1 K/UL (ref 1.4–6.5)
NEUTROPHILS ABSOLUTE: 5.4 K/UL (ref 1.4–6.5)
NEUTROPHILS ABSOLUTE: 5.6 K/UL (ref 1.4–6.5)
NEUTROPHILS ABSOLUTE: 5.7 K/UL (ref 1.4–6.5)
NEUTROPHILS ABSOLUTE: 5.8 K/UL (ref 1.4–6.5)
NEUTROPHILS ABSOLUTE: 6.1 K/UL (ref 1.4–6.5)
NEUTROPHILS ABSOLUTE: 6.2 K/UL (ref 1.4–6.5)
NEUTROPHILS ABSOLUTE: 6.4 K/UL (ref 1.4–6.5)
NEUTROPHILS ABSOLUTE: 7.5 K/UL (ref 1.4–6.5)
NEUTROPHILS ABSOLUTE: 8.1 K/UL (ref 1.4–6.5)
NEUTROPHILS ABSOLUTE: 8.3 K/UL (ref 1.4–6.5)
NEUTROPHILS ABSOLUTE: 9.4 K/UL (ref 1.4–6.5)
NEUTROPHILS RELATIVE PERCENT: 68.6 %
NEUTROPHILS RELATIVE PERCENT: 73.6 %
NEUTROPHILS RELATIVE PERCENT: 75 %
NEUTROPHILS RELATIVE PERCENT: 75.3 %
NEUTROPHILS RELATIVE PERCENT: 76.1 %
NEUTROPHILS RELATIVE PERCENT: 77.1 %
NEUTROPHILS RELATIVE PERCENT: 78.6 %
NEUTROPHILS RELATIVE PERCENT: 79.5 %
NEUTROPHILS RELATIVE PERCENT: 79.8 %
NEUTROPHILS RELATIVE PERCENT: 80.1 %
NEUTROPHILS RELATIVE PERCENT: 81.9 %
NEUTROPHILS RELATIVE PERCENT: 82.4 %
NEUTROPHILS RELATIVE PERCENT: 82.7 %
NEUTROPHILS RELATIVE PERCENT: 85.7 %
NEUTROPHILS RELATIVE PERCENT: 87.8 %
NEUTROPHILS RELATIVE PERCENT: 87.9 %
NEUTROPHILS RELATIVE PERCENT: 87.9 %
NEUTROPHILS RELATIVE PERCENT: 88.4 %
NEUTROPHILS RELATIVE PERCENT: 90.6 %
NEUTROPHILS RELATIVE PERCENT: 91.5 %
NEUTROPHILS RELATIVE PERCENT: 91.8 %
NEUTROPHILS RELATIVE PERCENT: 93.1 %
NEUTROPHILS RELATIVE PERCENT: 93.1 %
NEUTROPHILS RELATIVE PERCENT: 95.3 %
O2 SAT, ARTERIAL: 100 % (ref 93–100)
O2 SAT, ARTERIAL: 78 % (ref 93–100)
O2 SAT, ARTERIAL: 86 % (ref 93–100)
O2 SAT, ARTERIAL: 88 % (ref 93–100)
O2 SAT, ARTERIAL: 89 % (ref 93–100)
O2 SAT, ARTERIAL: 89 % (ref 93–100)
O2 SAT, ARTERIAL: 90 % (ref 93–100)
O2 SAT, ARTERIAL: 90 % (ref 93–100)
O2 SAT, ARTERIAL: 91 % (ref 93–100)
O2 SAT, ARTERIAL: 92 % (ref 93–100)
O2 SAT, ARTERIAL: 93 % (ref 93–100)
O2 SAT, ARTERIAL: 94 % (ref 93–100)
O2 SAT, ARTERIAL: 94 % (ref 93–100)
O2 SAT, ARTERIAL: 95 % (ref 93–100)
O2 SAT, ARTERIAL: 96 % (ref 93–100)
O2 SAT, ARTERIAL: 97 % (ref 93–100)
O2 SAT, ARTERIAL: 98 % (ref 93–100)
O2 SAT, ARTERIAL: 99 % (ref 93–100)
ORGANISM: ABNORMAL
PCO2 ARTERIAL: 121 MM HG (ref 35–45)
PCO2 ARTERIAL: 15 MM HG (ref 35–45)
PCO2 ARTERIAL: 17 MM HG (ref 35–45)
PCO2 ARTERIAL: 42 MM HG (ref 35–45)
PCO2 ARTERIAL: 44 MM HG (ref 35–45)
PCO2 ARTERIAL: 45 MM HG (ref 35–45)
PCO2 ARTERIAL: 46 MM HG (ref 35–45)
PCO2 ARTERIAL: 47 MM HG (ref 35–45)
PCO2 ARTERIAL: 48 MM HG (ref 35–45)
PCO2 ARTERIAL: 48 MM HG (ref 35–45)
PCO2 ARTERIAL: 49 MM HG (ref 35–45)
PCO2 ARTERIAL: 50 MM HG (ref 35–45)
PCO2 ARTERIAL: 51 MM HG (ref 35–45)
PCO2 ARTERIAL: 51 MM HG (ref 35–45)
PCO2 ARTERIAL: 52 MM HG (ref 35–45)
PCO2 ARTERIAL: 53 MM HG (ref 35–45)
PCO2 ARTERIAL: 55 MM HG (ref 35–45)
PCO2 ARTERIAL: 56 MM HG (ref 35–45)
PCO2 ARTERIAL: 56 MM HG (ref 35–45)
PCO2 ARTERIAL: 57 MM HG (ref 35–45)
PCO2 ARTERIAL: 62 MM HG (ref 35–45)
PCO2 ARTERIAL: 63 MM HG (ref 35–45)
PCO2 ARTERIAL: 68 MM HG (ref 35–45)
PCO2 ARTERIAL: 70 MM HG (ref 35–45)
PCO2 ARTERIAL: 73 MM HG (ref 35–45)
PCO2 ARTERIAL: 74 MM HG (ref 35–45)
PDW BLD-RTO: 12.9 % (ref 11.5–14.5)
PDW BLD-RTO: 13 % (ref 11.5–14.5)
PDW BLD-RTO: 13.1 % (ref 11.5–14.5)
PDW BLD-RTO: 13.2 % (ref 11.5–14.5)
PDW BLD-RTO: 13.3 % (ref 11.5–14.5)
PDW BLD-RTO: 13.4 % (ref 11.5–14.5)
PDW BLD-RTO: 13.5 % (ref 11.5–14.5)
PDW BLD-RTO: 13.5 % (ref 11.5–14.5)
PDW BLD-RTO: 13.6 % (ref 11.5–14.5)
PDW BLD-RTO: 13.6 % (ref 11.5–14.5)
PERFORMED ON: ABNORMAL
PERFORMED ON: NORMAL
PH ARTERIAL: 7.12 (ref 7.35–7.45)
PH ARTERIAL: 7.22 (ref 7.35–7.45)
PH ARTERIAL: 7.25 (ref 7.35–7.45)
PH ARTERIAL: 7.25 (ref 7.35–7.45)
PH ARTERIAL: 7.28 (ref 7.35–7.45)
PH ARTERIAL: 7.28 (ref 7.35–7.45)
PH ARTERIAL: 7.3 (ref 7.35–7.45)
PH ARTERIAL: 7.31 (ref 7.35–7.45)
PH ARTERIAL: 7.31 (ref 7.35–7.45)
PH ARTERIAL: 7.36 (ref 7.35–7.45)
PH ARTERIAL: 7.37 (ref 7.35–7.45)
PH ARTERIAL: 7.38 (ref 7.35–7.45)
PH ARTERIAL: 7.39 (ref 7.35–7.45)
PH ARTERIAL: 7.4 (ref 7.35–7.45)
PH ARTERIAL: 7.41 (ref 7.35–7.45)
PH ARTERIAL: 7.42 (ref 7.35–7.45)
PH ARTERIAL: 7.43 (ref 7.35–7.45)
PH ARTERIAL: 7.43 (ref 7.35–7.45)
PH ARTERIAL: 7.45 (ref 7.35–7.45)
PH ARTERIAL: 7.46 (ref 7.35–7.45)
PH ARTERIAL: 7.46 (ref 7.35–7.45)
PHOSPHORUS: 2.4 MG/DL (ref 2.3–4.8)
PHOSPHORUS: 2.7 MG/DL (ref 2.3–4.8)
PHOSPHORUS: 2.7 MG/DL (ref 2.3–4.8)
PHOSPHORUS: 2.8 MG/DL (ref 2.3–4.8)
PHOSPHORUS: 2.9 MG/DL (ref 2.3–4.8)
PHOSPHORUS: 3 MG/DL (ref 2.3–4.8)
PHOSPHORUS: 3.3 MG/DL (ref 2.3–4.8)
PHOSPHORUS: 3.3 MG/DL (ref 2.3–4.8)
PHOSPHORUS: 3.4 MG/DL (ref 2.3–4.8)
PHOSPHORUS: 3.4 MG/DL (ref 2.3–4.8)
PHOSPHORUS: 3.6 MG/DL (ref 2.3–4.8)
PHOSPHORUS: 5.5 MG/DL (ref 2.3–4.8)
PHOSPHORUS: 5.5 MG/DL (ref 2.3–4.8)
PHOSPHORUS: 6.3 MG/DL (ref 2.3–4.8)
PLATELET # BLD: 121 K/UL (ref 130–400)
PLATELET # BLD: 131 K/UL (ref 130–400)
PLATELET # BLD: 133 K/UL (ref 130–400)
PLATELET # BLD: 147 K/UL (ref 130–400)
PLATELET # BLD: 148 K/UL (ref 130–400)
PLATELET # BLD: 179 K/UL (ref 130–400)
PLATELET # BLD: 187 K/UL (ref 130–400)
PLATELET # BLD: 222 K/UL (ref 130–400)
PLATELET # BLD: 236 K/UL (ref 130–400)
PLATELET # BLD: 240 K/UL (ref 130–400)
PLATELET # BLD: 243 K/UL (ref 130–400)
PLATELET # BLD: 249 K/UL (ref 130–400)
PLATELET # BLD: 249 K/UL (ref 130–400)
PLATELET # BLD: 250 K/UL (ref 130–400)
PLATELET # BLD: 253 K/UL (ref 130–400)
PLATELET # BLD: 265 K/UL (ref 130–400)
PLATELET # BLD: 266 K/UL (ref 130–400)
PLATELET # BLD: 267 K/UL (ref 130–400)
PLATELET # BLD: 272 K/UL (ref 130–400)
PLATELET # BLD: 274 K/UL (ref 130–400)
PLATELET # BLD: 285 K/UL (ref 130–400)
PLATELET # BLD: 293 K/UL (ref 130–400)
PLATELET # BLD: 310 K/UL (ref 130–400)
PLATELET # BLD: 325 K/UL (ref 130–400)
PLATELET # BLD: 331 K/UL (ref 130–400)
PO2 ARTERIAL: 117 MM HG (ref 75–108)
PO2 ARTERIAL: 144 MM HG (ref 75–108)
PO2 ARTERIAL: 154 MM HG (ref 75–108)
PO2 ARTERIAL: 181 MM HG (ref 75–108)
PO2 ARTERIAL: 262 MM HG (ref 75–108)
PO2 ARTERIAL: 43 MM HG (ref 75–108)
PO2 ARTERIAL: 56 MM HG (ref 75–108)
PO2 ARTERIAL: 59 MM HG (ref 75–108)
PO2 ARTERIAL: 60 MM HG (ref 75–108)
PO2 ARTERIAL: 62 MM HG (ref 75–108)
PO2 ARTERIAL: 62 MM HG (ref 75–108)
PO2 ARTERIAL: 63 MM HG (ref 75–108)
PO2 ARTERIAL: 64 MM HG (ref 75–108)
PO2 ARTERIAL: 69 MM HG (ref 75–108)
PO2 ARTERIAL: 74 MM HG (ref 75–108)
PO2 ARTERIAL: 75 MM HG (ref 75–108)
PO2 ARTERIAL: 76 MM HG (ref 75–108)
PO2 ARTERIAL: 77 MM HG (ref 75–108)
PO2 ARTERIAL: 78 MM HG (ref 75–108)
PO2 ARTERIAL: 78 MM HG (ref 75–108)
PO2 ARTERIAL: 83 MM HG (ref 75–108)
PO2 ARTERIAL: 84 MM HG (ref 75–108)
PO2 ARTERIAL: 85 MM HG (ref 75–108)
PO2 ARTERIAL: 85 MM HG (ref 75–108)
PO2 ARTERIAL: 86 MM HG (ref 75–108)
PO2 ARTERIAL: 87 MM HG (ref 75–108)
PO2 ARTERIAL: 88 MM HG (ref 75–108)
PO2 ARTERIAL: 89 MM HG (ref 75–108)
PO2 ARTERIAL: 89 MM HG (ref 75–108)
PO2 ARTERIAL: 91 MM HG (ref 75–108)
PO2 ARTERIAL: 93 MM HG (ref 75–108)
POC CHLORIDE: 100 MEQ/L (ref 99–110)
POC CHLORIDE: 101 MEQ/L (ref 99–110)
POC CHLORIDE: 101 MEQ/L (ref 99–110)
POC CHLORIDE: 102 MEQ/L (ref 99–110)
POC CHLORIDE: 102 MEQ/L (ref 99–110)
POC CHLORIDE: 104 MEQ/L (ref 99–110)
POC CHLORIDE: 104 MEQ/L (ref 99–110)
POC CHLORIDE: 105 MEQ/L (ref 99–110)
POC CHLORIDE: 106 MEQ/L (ref 99–110)
POC CHLORIDE: 107 MEQ/L (ref 99–110)
POC CHLORIDE: 108 MEQ/L (ref 99–110)
POC CHLORIDE: 108 MEQ/L (ref 99–110)
POC CHLORIDE: 109 MEQ/L (ref 99–110)
POC CHLORIDE: 110 MEQ/L (ref 99–110)
POC CHLORIDE: 111 MEQ/L (ref 99–110)
POC CHLORIDE: 112 MEQ/L (ref 99–110)
POC CHLORIDE: 113 MEQ/L (ref 99–110)
POC CHLORIDE: 113 MEQ/L (ref 99–110)
POC CHLORIDE: 95 MEQ/L (ref 99–110)
POC CHLORIDE: 96 MEQ/L (ref 99–110)
POC CHLORIDE: 98 MEQ/L (ref 99–110)
POC CHLORIDE: 99 MEQ/L (ref 99–110)
POC CREATININE: 0.6 MG/DL (ref 0.9–1.3)
POC CREATININE: 0.7 MG/DL (ref 0.9–1.3)
POC CREATININE: 0.8 MG/DL (ref 0.9–1.3)
POC CREATININE: 0.9 MG/DL (ref 0.9–1.3)
POC CREATININE: 1 MG/DL (ref 0.9–1.3)
POC CREATININE: 1.1 MG/DL (ref 0.9–1.3)
POC CREATININE: 1.2 MG/DL (ref 0.9–1.3)
POC CREATININE: 1.3 MG/DL (ref 0.9–1.3)
POC CREATININE: 1.4 MG/DL (ref 0.9–1.3)
POC CREATININE: 1.5 MG/DL (ref 0.9–1.3)
POC CREATININE: 1.5 MG/DL (ref 0.9–1.3)
POC CREATININE: 1.6 MG/DL (ref 0.9–1.3)
POC CREATININE: 1.8 MG/DL (ref 0.9–1.3)
POC FIO2: 100
POC FIO2: 40
POC FIO2: 45
POC FIO2: 50
POC FIO2: 60
POC FIO2: 60
POC FIO2: 70
POC FIO2: 70
POC FIO2: 75
POC FIO2: 80
POC FIO2: 95
POC HEMATOCRIT: 30 % (ref 41–53)
POC HEMATOCRIT: 30 % (ref 41–53)
POC HEMATOCRIT: 32 % (ref 41–53)
POC HEMATOCRIT: 33 % (ref 41–53)
POC HEMATOCRIT: 33 % (ref 41–53)
POC HEMATOCRIT: 34 % (ref 41–53)
POC HEMATOCRIT: 35 % (ref 41–53)
POC HEMATOCRIT: 36 % (ref 41–53)
POC HEMATOCRIT: 36 % (ref 41–53)
POC HEMATOCRIT: 38 % (ref 41–53)
POC HEMATOCRIT: 39 % (ref 41–53)
POC HEMATOCRIT: 40 % (ref 41–53)
POC HEMATOCRIT: 41 % (ref 41–53)
POC HEMATOCRIT: 42 % (ref 41–53)
POC HEMATOCRIT: 43 % (ref 41–53)
POC HEMATOCRIT: 43 % (ref 41–53)
POC HEMATOCRIT: 44 % (ref 41–53)
POC HEMATOCRIT: 45 % (ref 41–53)
POC HEMATOCRIT: 45 % (ref 41–53)
POC HEMATOCRIT: 46 % (ref 41–53)
POC HEMATOCRIT: 48 % (ref 41–53)
POC HEMATOCRIT: 48 % (ref 41–53)
POC HEMATOCRIT: 50 % (ref 41–53)
POC HEMATOCRIT: 51 % (ref 41–53)
POC HEMATOCRIT: 51 % (ref 41–53)
POC HEMATOCRIT: 52 % (ref 41–53)
POC POTASSIUM: 2.6 MEQ/L (ref 3.5–5.1)
POC POTASSIUM: 2.7 MEQ/L (ref 3.5–5.1)
POC POTASSIUM: 3 MEQ/L (ref 3.5–5.1)
POC POTASSIUM: 3.1 MEQ/L (ref 3.5–5.1)
POC POTASSIUM: 3.1 MEQ/L (ref 3.5–5.1)
POC POTASSIUM: 3.3 MEQ/L (ref 3.5–5.1)
POC POTASSIUM: 3.3 MEQ/L (ref 3.5–5.1)
POC POTASSIUM: 3.4 MEQ/L (ref 3.5–5.1)
POC POTASSIUM: 3.5 MEQ/L (ref 3.5–5.1)
POC POTASSIUM: 3.6 MEQ/L (ref 3.5–5.1)
POC POTASSIUM: 3.7 MEQ/L (ref 3.5–5.1)
POC POTASSIUM: 3.7 MEQ/L (ref 3.5–5.1)
POC POTASSIUM: 3.8 MEQ/L (ref 3.5–5.1)
POC POTASSIUM: 3.9 MEQ/L (ref 3.5–5.1)
POC POTASSIUM: 4 MEQ/L (ref 3.5–5.1)
POC POTASSIUM: 4 MEQ/L (ref 3.5–5.1)
POC POTASSIUM: 4.1 MEQ/L (ref 3.5–5.1)
POC POTASSIUM: 4.3 MEQ/L (ref 3.5–5.1)
POC POTASSIUM: 4.6 MEQ/L (ref 3.5–5.1)
POC POTASSIUM: 4.7 MEQ/L (ref 3.5–5.1)
POC POTASSIUM: 4.8 MEQ/L (ref 3.5–5.1)
POC POTASSIUM: 4.8 MEQ/L (ref 3.5–5.1)
POC POTASSIUM: 4.9 MEQ/L (ref 3.5–5.1)
POC POTASSIUM: 4.9 MEQ/L (ref 3.5–5.1)
POC POTASSIUM: 5 MEQ/L (ref 3.5–5.1)
POC POTASSIUM: 5.1 MEQ/L (ref 3.5–5.1)
POC POTASSIUM: 5.7 MEQ/L (ref 3.5–5.1)
POC POTASSIUM: 6 MEQ/L (ref 3.5–5.1)
POC SAMPLE TYPE: ABNORMAL
POC SODIUM: 130 MEQ/L (ref 136–145)
POC SODIUM: 130 MEQ/L (ref 136–145)
POC SODIUM: 138 MEQ/L (ref 136–145)
POC SODIUM: 139 MEQ/L (ref 136–145)
POC SODIUM: 140 MEQ/L (ref 136–145)
POC SODIUM: 141 MEQ/L (ref 136–145)
POC SODIUM: 142 MEQ/L (ref 136–145)
POC SODIUM: 142 MEQ/L (ref 136–145)
POC SODIUM: 143 MEQ/L (ref 136–145)
POC SODIUM: 144 MEQ/L (ref 136–145)
POC SODIUM: 146 MEQ/L (ref 136–145)
POC SODIUM: 147 MEQ/L (ref 136–145)
POC SODIUM: 147 MEQ/L (ref 136–145)
POC SODIUM: 148 MEQ/L (ref 136–145)
POC SODIUM: 149 MEQ/L (ref 136–145)
POC SODIUM: 151 MEQ/L (ref 136–145)
POC SODIUM: 152 MEQ/L (ref 136–145)
POC SODIUM: 153 MEQ/L (ref 136–145)
POC SODIUM: 154 MEQ/L (ref 136–145)
POC SODIUM: 155 MEQ/L (ref 136–145)
POTASSIUM REFLEX MAGNESIUM: 2.8 MEQ/L (ref 3.4–4.9)
POTASSIUM REFLEX MAGNESIUM: 3 MEQ/L (ref 3.4–4.9)
POTASSIUM REFLEX MAGNESIUM: 3.2 MEQ/L (ref 3.4–4.9)
POTASSIUM REFLEX MAGNESIUM: 3.2 MEQ/L (ref 3.4–4.9)
POTASSIUM REFLEX MAGNESIUM: 3.3 MEQ/L (ref 3.4–4.9)
POTASSIUM REFLEX MAGNESIUM: 3.5 MEQ/L (ref 3.4–4.9)
POTASSIUM REFLEX MAGNESIUM: 3.6 MEQ/L (ref 3.4–4.9)
POTASSIUM REFLEX MAGNESIUM: 3.6 MEQ/L (ref 3.4–4.9)
POTASSIUM REFLEX MAGNESIUM: 3.7 MEQ/L (ref 3.4–4.9)
POTASSIUM REFLEX MAGNESIUM: 3.7 MEQ/L (ref 3.4–4.9)
POTASSIUM REFLEX MAGNESIUM: 3.8 MEQ/L (ref 3.4–4.9)
POTASSIUM REFLEX MAGNESIUM: 3.9 MEQ/L (ref 3.4–4.9)
POTASSIUM REFLEX MAGNESIUM: 3.9 MEQ/L (ref 3.4–4.9)
POTASSIUM REFLEX MAGNESIUM: 4 MEQ/L (ref 3.4–4.9)
POTASSIUM REFLEX MAGNESIUM: 4.1 MEQ/L (ref 3.4–4.9)
POTASSIUM REFLEX MAGNESIUM: 4.3 MEQ/L (ref 3.4–4.9)
POTASSIUM REFLEX MAGNESIUM: 4.3 MEQ/L (ref 3.4–4.9)
POTASSIUM REFLEX MAGNESIUM: 4.8 MEQ/L (ref 3.4–4.9)
POTASSIUM REFLEX MAGNESIUM: 5 MEQ/L (ref 3.4–4.9)
POTASSIUM REFLEX MAGNESIUM: 5.2 MEQ/L (ref 3.4–4.9)
POTASSIUM REFLEX MAGNESIUM: 5.9 MEQ/L (ref 3.4–4.9)
POTASSIUM SERPL-SCNC: 2.8 MEQ/L (ref 3.4–4.9)
POTASSIUM SERPL-SCNC: 3 MEQ/L (ref 3.4–4.9)
POTASSIUM SERPL-SCNC: 3.2 MEQ/L (ref 3.4–4.9)
POTASSIUM SERPL-SCNC: 3.2 MEQ/L (ref 3.4–4.9)
POTASSIUM SERPL-SCNC: 3.3 MEQ/L (ref 3.4–4.9)
POTASSIUM SERPL-SCNC: 3.5 MEQ/L (ref 3.4–4.9)
POTASSIUM SERPL-SCNC: 3.6 MEQ/L (ref 3.4–4.9)
POTASSIUM SERPL-SCNC: 3.8 MEQ/L (ref 3.4–4.9)
POTASSIUM SERPL-SCNC: 3.9 MEQ/L (ref 3.4–4.9)
POTASSIUM SERPL-SCNC: 4.1 MEQ/L (ref 3.4–4.9)
POTASSIUM SERPL-SCNC: 4.1 MEQ/L (ref 3.4–4.9)
POTASSIUM SERPL-SCNC: 4.3 MEQ/L (ref 3.4–4.9)
POTASSIUM SERPL-SCNC: 4.3 MEQ/L (ref 3.4–4.9)
POTASSIUM SERPL-SCNC: 4.8 MEQ/L (ref 3.4–4.9)
POTASSIUM SERPL-SCNC: 5 MEQ/L (ref 3.4–4.9)
POTASSIUM SERPL-SCNC: 5.9 MEQ/L (ref 3.4–4.9)
PRO-BNP: 279 PG/ML
PROCALCITONIN: 0.05 NG/ML (ref 0–0.15)
PROCALCITONIN: 0.07 NG/ML (ref 0–0.15)
PROCALCITONIN: 0.18 NG/ML (ref 0–0.15)
PROCALCITONIN: 0.21 NG/ML (ref 0–0.15)
PROCALCITONIN: 0.22 NG/ML (ref 0–0.15)
RBC # BLD: 3.3 M/UL (ref 4.7–6.1)
RBC # BLD: 3.37 M/UL (ref 4.7–6.1)
RBC # BLD: 3.69 M/UL (ref 4.7–6.1)
RBC # BLD: 3.79 M/UL (ref 4.7–6.1)
RBC # BLD: 3.86 M/UL (ref 4.7–6.1)
RBC # BLD: 4.08 M/UL (ref 4.7–6.1)
RBC # BLD: 4.44 M/UL (ref 4.7–6.1)
RBC # BLD: 4.51 M/UL (ref 4.7–6.1)
RBC # BLD: 4.6 M/UL (ref 4.7–6.1)
RBC # BLD: 4.81 M/UL (ref 4.7–6.1)
RBC # BLD: 4.82 M/UL (ref 4.7–6.1)
RBC # BLD: 4.88 M/UL (ref 4.7–6.1)
RBC # BLD: 4.93 M/UL (ref 4.7–6.1)
RBC # BLD: 4.97 M/UL (ref 4.7–6.1)
RBC # BLD: 4.98 M/UL (ref 4.7–6.1)
RBC # BLD: 5.01 M/UL (ref 4.7–6.1)
RBC # BLD: 5.06 M/UL (ref 4.7–6.1)
RBC # BLD: 5.06 M/UL (ref 4.7–6.1)
RBC # BLD: 5.21 M/UL (ref 4.7–6.1)
RBC # BLD: 5.25 M/UL (ref 4.7–6.1)
RBC # BLD: 5.38 M/UL (ref 4.7–6.1)
RBC # BLD: 5.39 M/UL (ref 4.7–6.1)
RBC # BLD: 5.42 M/UL (ref 4.7–6.1)
RBC # BLD: 5.53 M/UL (ref 4.7–6.1)
RBC # BLD: 5.58 M/UL (ref 4.7–6.1)
SARS-COV-2, NAAT: DETECTED
SODIUM BLD-SCNC: 131 MEQ/L (ref 135–144)
SODIUM BLD-SCNC: 131 MEQ/L (ref 135–144)
SODIUM BLD-SCNC: 134 MEQ/L (ref 135–144)
SODIUM BLD-SCNC: 135 MEQ/L (ref 135–144)
SODIUM BLD-SCNC: 136 MEQ/L (ref 135–144)
SODIUM BLD-SCNC: 136 MEQ/L (ref 135–144)
SODIUM BLD-SCNC: 137 MEQ/L (ref 135–144)
SODIUM BLD-SCNC: 138 MEQ/L (ref 135–144)
SODIUM BLD-SCNC: 139 MEQ/L (ref 135–144)
SODIUM BLD-SCNC: 140 MEQ/L (ref 135–144)
SODIUM BLD-SCNC: 142 MEQ/L (ref 135–144)
SODIUM BLD-SCNC: 143 MEQ/L (ref 135–144)
SODIUM BLD-SCNC: 144 MEQ/L (ref 135–144)
SODIUM BLD-SCNC: 147 MEQ/L (ref 135–144)
SODIUM BLD-SCNC: 152 MEQ/L (ref 135–144)
SODIUM BLD-SCNC: 152 MEQ/L (ref 135–144)
TCO2 ARTERIAL: 29 (ref 22–29)
TCO2 ARTERIAL: 30 (ref 22–29)
TCO2 ARTERIAL: 31 (ref 22–29)
TCO2 ARTERIAL: 32 (ref 22–29)
TCO2 ARTERIAL: 33 (ref 22–29)
TCO2 ARTERIAL: 34 (ref 22–29)
TCO2 ARTERIAL: 35 (ref 22–29)
TCO2 ARTERIAL: 35 (ref 22–29)
TCO2 ARTERIAL: 36 (ref 22–29)
TCO2 ARTERIAL: 37 (ref 22–29)
TCO2 ARTERIAL: 43 (ref 22–29)
TCO2 ARTERIAL: 8 (ref 22–29)
TCO2 ARTERIAL: 9 (ref 22–29)
TOTAL PROTEIN: 5.2 G/DL (ref 6.3–8)
TOTAL PROTEIN: 5.4 G/DL (ref 6.3–8)
TOTAL PROTEIN: 5.6 G/DL (ref 6.3–8)
TOTAL PROTEIN: 5.7 G/DL (ref 6.3–8)
TOTAL PROTEIN: 5.7 G/DL (ref 6.3–8)
TOTAL PROTEIN: 5.8 G/DL (ref 6.3–8)
TOTAL PROTEIN: 5.9 G/DL (ref 6.3–8)
TOTAL PROTEIN: 6 G/DL (ref 6.3–8)
TOTAL PROTEIN: 6 G/DL (ref 6.3–8)
TOTAL PROTEIN: 6.1 G/DL (ref 6.3–8)
TOTAL PROTEIN: 6.2 G/DL (ref 6.3–8)
TOTAL PROTEIN: 6.2 G/DL (ref 6.3–8)
TOTAL PROTEIN: 6.5 G/DL (ref 6.3–8)
TOTAL PROTEIN: 6.8 G/DL (ref 6.3–8)
TOTAL PROTEIN: 7 G/DL (ref 6.3–8)
TOTAL PROTEIN: 7.5 G/DL (ref 6.3–8)
TROPONIN: <0.01 NG/ML (ref 0–0.01)
URINE CULTURE, ROUTINE: NORMAL
URINE CULTURE, ROUTINE: NORMAL
VANCOMYCIN RANDOM: 20.5 UG/ML (ref 10–40)
VANCOMYCIN RANDOM: 8.6 UG/ML (ref 10–40)
WBC # BLD: 10 K/UL (ref 4.8–10.8)
WBC # BLD: 10.6 K/UL (ref 4.8–10.8)
WBC # BLD: 12.5 K/UL (ref 4.8–10.8)
WBC # BLD: 13.3 K/UL (ref 4.8–10.8)
WBC # BLD: 13.4 K/UL (ref 4.8–10.8)
WBC # BLD: 13.8 K/UL (ref 4.8–10.8)
WBC # BLD: 14.3 K/UL (ref 4.8–10.8)
WBC # BLD: 16.7 K/UL (ref 4.8–10.8)
WBC # BLD: 17.6 K/UL (ref 4.8–10.8)
WBC # BLD: 18.9 K/UL (ref 4.8–10.8)
WBC # BLD: 19.1 K/UL (ref 4.8–10.8)
WBC # BLD: 20 K/UL (ref 4.8–10.8)
WBC # BLD: 5.2 K/UL (ref 4.8–10.8)
WBC # BLD: 5.2 K/UL (ref 4.8–10.8)
WBC # BLD: 7 K/UL (ref 4.8–10.8)
WBC # BLD: 7.2 K/UL (ref 4.8–10.8)
WBC # BLD: 7.3 K/UL (ref 4.8–10.8)
WBC # BLD: 7.5 K/UL (ref 4.8–10.8)
WBC # BLD: 7.6 K/UL (ref 4.8–10.8)
WBC # BLD: 7.6 K/UL (ref 4.8–10.8)
WBC # BLD: 7.9 K/UL (ref 4.8–10.8)
WBC # BLD: 8 K/UL (ref 4.8–10.8)
WBC # BLD: 8.6 K/UL (ref 4.8–10.8)
WBC # BLD: 9.7 K/UL (ref 4.8–10.8)
WBC # BLD: 9.9 K/UL (ref 4.8–10.8)

## 2021-01-01 PROCEDURE — 6360000002 HC RX W HCPCS: Performed by: NURSE PRACTITIONER

## 2021-01-01 PROCEDURE — 6360000002 HC RX W HCPCS: Performed by: INTERNAL MEDICINE

## 2021-01-01 PROCEDURE — 85014 HEMATOCRIT: CPT

## 2021-01-01 PROCEDURE — 85027 COMPLETE CBC AUTOMATED: CPT

## 2021-01-01 PROCEDURE — 87205 SMEAR GRAM STAIN: CPT

## 2021-01-01 PROCEDURE — 36415 COLL VENOUS BLD VENIPUNCTURE: CPT

## 2021-01-01 PROCEDURE — 2500000003 HC RX 250 WO HCPCS: Performed by: INTERNAL MEDICINE

## 2021-01-01 PROCEDURE — 2580000003 HC RX 258: Performed by: INTERNAL MEDICINE

## 2021-01-01 PROCEDURE — 84295 ASSAY OF SERUM SODIUM: CPT

## 2021-01-01 PROCEDURE — 94640 AIRWAY INHALATION TREATMENT: CPT

## 2021-01-01 PROCEDURE — 2700000000 HC OXYGEN THERAPY PER DAY

## 2021-01-01 PROCEDURE — 94669 MECHANICAL CHEST WALL OSCILL: CPT

## 2021-01-01 PROCEDURE — 94799 UNLISTED PULMONARY SVC/PX: CPT

## 2021-01-01 PROCEDURE — 80053 COMPREHEN METABOLIC PANEL: CPT

## 2021-01-01 PROCEDURE — 82330 ASSAY OF CALCIUM: CPT

## 2021-01-01 PROCEDURE — 37799 UNLISTED PX VASCULAR SURGERY: CPT

## 2021-01-01 PROCEDURE — 94003 VENT MGMT INPAT SUBQ DAY: CPT

## 2021-01-01 PROCEDURE — C9113 INJ PANTOPRAZOLE SODIUM, VIA: HCPCS | Performed by: INTERNAL MEDICINE

## 2021-01-01 PROCEDURE — 6370000000 HC RX 637 (ALT 250 FOR IP): Performed by: INTERNAL MEDICINE

## 2021-01-01 PROCEDURE — 99291 CRITICAL CARE FIRST HOUR: CPT | Performed by: INTERNAL MEDICINE

## 2021-01-01 PROCEDURE — 85025 COMPLETE CBC W/AUTO DIFF WBC: CPT

## 2021-01-01 PROCEDURE — 6370000000 HC RX 637 (ALT 250 FOR IP): Performed by: NURSE PRACTITIONER

## 2021-01-01 PROCEDURE — 84132 ASSAY OF SERUM POTASSIUM: CPT

## 2021-01-01 PROCEDURE — 94150 VITAL CAPACITY TEST: CPT

## 2021-01-01 PROCEDURE — 84484 ASSAY OF TROPONIN QUANT: CPT

## 2021-01-01 PROCEDURE — 87077 CULTURE AEROBIC IDENTIFY: CPT

## 2021-01-01 PROCEDURE — 6360000004 HC RX CONTRAST MEDICATION: Performed by: INTERNAL MEDICINE

## 2021-01-01 PROCEDURE — 2000000000 HC ICU R&B

## 2021-01-01 PROCEDURE — 94761 N-INVAS EAR/PLS OXIMETRY MLT: CPT

## 2021-01-01 PROCEDURE — 99232 SBSQ HOSP IP/OBS MODERATE 35: CPT | Performed by: INTERNAL MEDICINE

## 2021-01-01 PROCEDURE — 99232 SBSQ HOSP IP/OBS MODERATE 35: CPT | Performed by: PHYSICIAN ASSISTANT

## 2021-01-01 PROCEDURE — 71045 X-RAY EXAM CHEST 1 VIEW: CPT

## 2021-01-01 PROCEDURE — 83605 ASSAY OF LACTIC ACID: CPT

## 2021-01-01 PROCEDURE — 94660 CPAP INITIATION&MGMT: CPT

## 2021-01-01 PROCEDURE — 82565 ASSAY OF CREATININE: CPT

## 2021-01-01 PROCEDURE — 99254 IP/OBS CNSLTJ NEW/EST MOD 60: CPT | Performed by: INTERNAL MEDICINE

## 2021-01-01 PROCEDURE — 99233 SBSQ HOSP IP/OBS HIGH 50: CPT | Performed by: INTERNAL MEDICINE

## 2021-01-01 PROCEDURE — 82948 REAGENT STRIP/BLOOD GLUCOSE: CPT

## 2021-01-01 PROCEDURE — 2580000003 HC RX 258

## 2021-01-01 PROCEDURE — 99231 SBSQ HOSP IP/OBS SF/LOW 25: CPT | Performed by: INTERNAL MEDICINE

## 2021-01-01 PROCEDURE — 82728 ASSAY OF FERRITIN: CPT

## 2021-01-01 PROCEDURE — 31500 INSERT EMERGENCY AIRWAY: CPT

## 2021-01-01 PROCEDURE — 84145 PROCALCITONIN (PCT): CPT

## 2021-01-01 PROCEDURE — 83036 HEMOGLOBIN GLYCOSYLATED A1C: CPT

## 2021-01-01 PROCEDURE — 82435 ASSAY OF BLOOD CHLORIDE: CPT

## 2021-01-01 PROCEDURE — 31500 INSERT EMERGENCY AIRWAY: CPT | Performed by: INTERNAL MEDICINE

## 2021-01-01 PROCEDURE — 0BH17EZ INSERTION OF ENDOTRACHEAL AIRWAY INTO TRACHEA, VIA NATURAL OR ARTIFICIAL OPENING: ICD-10-PCS | Performed by: INTERNAL MEDICINE

## 2021-01-01 PROCEDURE — 36600 WITHDRAWAL OF ARTERIAL BLOOD: CPT

## 2021-01-01 PROCEDURE — 93971 EXTREMITY STUDY: CPT

## 2021-01-01 PROCEDURE — 94668 MNPJ CHEST WALL SBSQ: CPT

## 2021-01-01 PROCEDURE — 71275 CT ANGIOGRAPHY CHEST: CPT

## 2021-01-01 PROCEDURE — 2500000003 HC RX 250 WO HCPCS

## 2021-01-01 PROCEDURE — 76937 US GUIDE VASCULAR ACCESS: CPT | Performed by: INTERNAL MEDICINE

## 2021-01-01 PROCEDURE — 94002 VENT MGMT INPAT INIT DAY: CPT

## 2021-01-01 PROCEDURE — 82803 BLOOD GASES ANY COMBINATION: CPT

## 2021-01-01 PROCEDURE — 83735 ASSAY OF MAGNESIUM: CPT

## 2021-01-01 PROCEDURE — 36556 INSERT NON-TUNNEL CV CATH: CPT | Performed by: INTERNAL MEDICINE

## 2021-01-01 PROCEDURE — XW04396 INTRODUCTION OF CEFTOLOZANE/TAZOBACTAM ANTI-INFECTIVE INTO CENTRAL VEIN, PERCUTANEOUS APPROACH, NEW TECHNOLOGY GROUP 6: ICD-10-PCS | Performed by: INTERNAL MEDICINE

## 2021-01-01 PROCEDURE — 87149 DNA/RNA DIRECT PROBE: CPT

## 2021-01-01 PROCEDURE — 2060000000 HC ICU INTERMEDIATE R&B

## 2021-01-01 PROCEDURE — 96374 THER/PROPH/DIAG INJ IV PUSH: CPT

## 2021-01-01 PROCEDURE — 93970 EXTREMITY STUDY: CPT

## 2021-01-01 PROCEDURE — 94760 N-INVAS EAR/PLS OXIMETRY 1: CPT

## 2021-01-01 PROCEDURE — 6360000002 HC RX W HCPCS

## 2021-01-01 PROCEDURE — 87086 URINE CULTURE/COLONY COUNT: CPT

## 2021-01-01 PROCEDURE — 85379 FIBRIN DEGRADATION QUANT: CPT

## 2021-01-01 PROCEDURE — 99285 EMERGENCY DEPT VISIT HI MDM: CPT

## 2021-01-01 PROCEDURE — 02HV33Z INSERTION OF INFUSION DEVICE INTO SUPERIOR VENA CAVA, PERCUTANEOUS APPROACH: ICD-10-PCS | Performed by: INTERNAL MEDICINE

## 2021-01-01 PROCEDURE — 87040 BLOOD CULTURE FOR BACTERIA: CPT

## 2021-01-01 PROCEDURE — 83880 ASSAY OF NATRIURETIC PEPTIDE: CPT

## 2021-01-01 PROCEDURE — 99213 OFFICE O/P EST LOW 20 MIN: CPT

## 2021-01-01 PROCEDURE — 80202 ASSAY OF VANCOMYCIN: CPT

## 2021-01-01 PROCEDURE — 87070 CULTURE OTHR SPECIMN AEROBIC: CPT

## 2021-01-01 PROCEDURE — 93010 ELECTROCARDIOGRAM REPORT: CPT | Performed by: INTERNAL MEDICINE

## 2021-01-01 PROCEDURE — 99222 1ST HOSP IP/OBS MODERATE 55: CPT | Performed by: INTERNAL MEDICINE

## 2021-01-01 PROCEDURE — 93306 TTE W/DOPPLER COMPLETE: CPT

## 2021-01-01 PROCEDURE — 5A1955Z RESPIRATORY VENTILATION, GREATER THAN 96 CONSECUTIVE HOURS: ICD-10-PCS | Performed by: INTERNAL MEDICINE

## 2021-01-01 PROCEDURE — 51702 INSERT TEMP BLADDER CATH: CPT

## 2021-01-01 PROCEDURE — 36556 INSERT NON-TUNNEL CV CATH: CPT

## 2021-01-01 PROCEDURE — 94667 MNPJ CHEST WALL 1ST: CPT

## 2021-01-01 PROCEDURE — 96375 TX/PRO/DX INJ NEW DRUG ADDON: CPT

## 2021-01-01 PROCEDURE — 36620 INSERTION CATHETER ARTERY: CPT

## 2021-01-01 PROCEDURE — XW033E5 INTRODUCTION OF REMDESIVIR ANTI-INFECTIVE INTO PERIPHERAL VEIN, PERCUTANEOUS APPROACH, NEW TECHNOLOGY GROUP 5: ICD-10-PCS | Performed by: INTERNAL MEDICINE

## 2021-01-01 PROCEDURE — 93005 ELECTROCARDIOGRAM TRACING: CPT | Performed by: INTERNAL MEDICINE

## 2021-01-01 PROCEDURE — 86140 C-REACTIVE PROTEIN: CPT

## 2021-01-01 PROCEDURE — 87186 SC STD MICRODIL/AGAR DIL: CPT

## 2021-01-01 PROCEDURE — 6360000002 HC RX W HCPCS: Performed by: PHYSICIAN ASSISTANT

## 2021-01-01 PROCEDURE — 2500000003 HC RX 250 WO HCPCS: Performed by: NURSE PRACTITIONER

## 2021-01-01 PROCEDURE — 85384 FIBRINOGEN ACTIVITY: CPT

## 2021-01-01 PROCEDURE — 36620 INSERTION CATHETER ARTERY: CPT | Performed by: INTERNAL MEDICINE

## 2021-01-01 PROCEDURE — XW033H5 INTRODUCTION OF TOCILIZUMAB INTO PERIPHERAL VEIN, PERCUTANEOUS APPROACH, NEW TECHNOLOGY GROUP 5: ICD-10-PCS | Performed by: INTERNAL MEDICINE

## 2021-01-01 PROCEDURE — 93005 ELECTROCARDIOGRAM TRACING: CPT | Performed by: PHYSICIAN ASSISTANT

## 2021-01-01 PROCEDURE — 87635 SARS-COV-2 COVID-19 AMP PRB: CPT

## 2021-01-01 PROCEDURE — 36592 COLLECT BLOOD FROM PICC: CPT

## 2021-01-01 RX ORDER — PROPOFOL 10 MG/ML
INJECTION, EMULSION INTRAVENOUS
Status: DISPENSED
Start: 2021-01-01 | End: 2021-01-01

## 2021-01-01 RX ORDER — FUROSEMIDE 10 MG/ML
20 INJECTION INTRAMUSCULAR; INTRAVENOUS ONCE
Status: COMPLETED | OUTPATIENT
Start: 2021-01-01 | End: 2021-01-01

## 2021-01-01 RX ORDER — FENTANYL CITRATE 50 UG/ML
INJECTION, SOLUTION INTRAMUSCULAR; INTRAVENOUS
Status: COMPLETED
Start: 2021-01-01 | End: 2021-01-01

## 2021-01-01 RX ORDER — LACTULOSE 10 G/15ML
20 SOLUTION ORAL 2 TIMES DAILY
Status: DISCONTINUED | OUTPATIENT
Start: 2021-01-01 | End: 2021-01-01

## 2021-01-01 RX ORDER — CHLORHEXIDINE GLUCONATE 0.12 MG/ML
15 RINSE ORAL 2 TIMES DAILY
Status: DISCONTINUED | OUTPATIENT
Start: 2021-01-01 | End: 2021-01-01

## 2021-01-01 RX ORDER — MAGNESIUM SULFATE IN WATER 40 MG/ML
2000 INJECTION, SOLUTION INTRAVENOUS PRN
Status: DISCONTINUED | OUTPATIENT
Start: 2021-01-01 | End: 2021-01-01

## 2021-01-01 RX ORDER — SODIUM CHLORIDE 9 MG/ML
INJECTION, SOLUTION INTRAVENOUS
Status: COMPLETED
Start: 2021-01-01 | End: 2021-01-01

## 2021-01-01 RX ORDER — ATROPINE SULFATE 0.1 MG/ML
INJECTION INTRAVENOUS
Status: DISPENSED
Start: 2021-01-01 | End: 2021-01-01

## 2021-01-01 RX ORDER — DEXTROSE MONOHYDRATE 25 G/50ML
12.5 INJECTION, SOLUTION INTRAVENOUS PRN
Status: DISCONTINUED | OUTPATIENT
Start: 2021-01-01 | End: 2021-01-01

## 2021-01-01 RX ORDER — LORAZEPAM 2 MG/ML
INJECTION INTRAMUSCULAR
Status: COMPLETED
Start: 2021-01-01 | End: 2021-01-01

## 2021-01-01 RX ORDER — SODIUM CHLORIDE, SODIUM LACTATE, POTASSIUM CHLORIDE, CALCIUM CHLORIDE 600; 310; 30; 20 MG/100ML; MG/100ML; MG/100ML; MG/100ML
INJECTION, SOLUTION INTRAVENOUS
Status: COMPLETED
Start: 2021-01-01 | End: 2021-01-01

## 2021-01-01 RX ORDER — BISACODYL 10 MG
10 SUPPOSITORY, RECTAL RECTAL ONCE
Status: COMPLETED | OUTPATIENT
Start: 2021-01-01 | End: 2021-01-01

## 2021-01-01 RX ORDER — DOCUSATE SODIUM 100 MG/1
100 CAPSULE, LIQUID FILLED ORAL 2 TIMES DAILY
Status: DISCONTINUED | OUTPATIENT
Start: 2021-01-01 | End: 2021-01-01 | Stop reason: ALTCHOICE

## 2021-01-01 RX ORDER — FENTANYL CITRATE 50 UG/ML
25 INJECTION, SOLUTION INTRAMUSCULAR; INTRAVENOUS ONCE
Status: COMPLETED | OUTPATIENT
Start: 2021-01-01 | End: 2021-01-01

## 2021-01-01 RX ORDER — INSULIN GLARGINE 100 [IU]/ML
10 INJECTION, SOLUTION SUBCUTANEOUS NIGHTLY
Status: DISCONTINUED | OUTPATIENT
Start: 2021-01-01 | End: 2021-01-01

## 2021-01-01 RX ORDER — COVID-19 ANTIGEN TEST
220 KIT MISCELLANEOUS PRN
COMMUNITY

## 2021-01-01 RX ORDER — MIDAZOLAM HYDROCHLORIDE 1 MG/ML
INJECTION INTRAMUSCULAR; INTRAVENOUS
Status: COMPLETED
Start: 2021-01-01 | End: 2021-01-01

## 2021-01-01 RX ORDER — INSULIN GLARGINE 100 [IU]/ML
20 INJECTION, SOLUTION SUBCUTANEOUS 2 TIMES DAILY
Status: DISCONTINUED | OUTPATIENT
Start: 2021-01-01 | End: 2021-01-01

## 2021-01-01 RX ORDER — IPRATROPIUM BROMIDE AND ALBUTEROL SULFATE 2.5; .5 MG/3ML; MG/3ML
1 SOLUTION RESPIRATORY (INHALATION) EVERY 4 HOURS PRN
Status: DISCONTINUED | OUTPATIENT
Start: 2021-01-01 | End: 2021-01-01

## 2021-01-01 RX ORDER — SODIUM CHLORIDE 9 MG/ML
10 INJECTION INTRAVENOUS DAILY
Status: DISCONTINUED | OUTPATIENT
Start: 2021-01-01 | End: 2021-01-01

## 2021-01-01 RX ORDER — DOPAMINE HYDROCHLORIDE 160 MG/100ML
2-10 INJECTION, SOLUTION INTRAVENOUS CONTINUOUS
Status: DISCONTINUED | OUTPATIENT
Start: 2021-01-01 | End: 2021-01-01

## 2021-01-01 RX ORDER — POLYETHYLENE GLYCOL 3350 17 G/17G
17 POWDER, FOR SOLUTION ORAL DAILY
Status: DISCONTINUED | OUTPATIENT
Start: 2021-01-01 | End: 2021-01-01

## 2021-01-01 RX ORDER — ACETAMINOPHEN 325 MG/1
650 TABLET ORAL EVERY 4 HOURS PRN
Status: DISCONTINUED | OUTPATIENT
Start: 2021-01-01 | End: 2021-01-01

## 2021-01-01 RX ORDER — CODEINE PHOSPHATE AND GUAIFENESIN 10; 100 MG/5ML; MG/5ML
10 SOLUTION ORAL EVERY 4 HOURS PRN
Status: DISCONTINUED | OUTPATIENT
Start: 2021-01-01 | End: 2021-01-01

## 2021-01-01 RX ORDER — INSULIN GLARGINE 100 [IU]/ML
15 INJECTION, SOLUTION SUBCUTANEOUS 2 TIMES DAILY
Status: DISCONTINUED | OUTPATIENT
Start: 2021-01-01 | End: 2021-01-01

## 2021-01-01 RX ORDER — METOCLOPRAMIDE HYDROCHLORIDE 5 MG/ML
10 INJECTION INTRAMUSCULAR; INTRAVENOUS EVERY 12 HOURS
Status: DISCONTINUED | OUTPATIENT
Start: 2021-01-01 | End: 2021-01-01

## 2021-01-01 RX ORDER — EPINEPHRINE 0.1 MG/ML
SYRINGE (ML) INJECTION
Status: DISPENSED
Start: 2021-01-01 | End: 2021-01-01

## 2021-01-01 RX ORDER — SODIUM CHLORIDE 0.9 % (FLUSH) 0.9 %
5-40 SYRINGE (ML) INJECTION EVERY 12 HOURS SCHEDULED
Status: DISCONTINUED | OUTPATIENT
Start: 2021-01-01 | End: 2021-01-01

## 2021-01-01 RX ORDER — DEXTROSE MONOHYDRATE 50 MG/ML
100 INJECTION, SOLUTION INTRAVENOUS PRN
Status: DISCONTINUED | OUTPATIENT
Start: 2021-01-01 | End: 2021-01-01

## 2021-01-01 RX ORDER — ACETAMINOPHEN 650 MG/1
650 SUPPOSITORY RECTAL EVERY 6 HOURS PRN
Status: DISCONTINUED | OUTPATIENT
Start: 2021-01-01 | End: 2021-01-01

## 2021-01-01 RX ORDER — SODIUM CHLORIDE, SODIUM LACTATE, POTASSIUM CHLORIDE, AND CALCIUM CHLORIDE .6; .31; .03; .02 G/100ML; G/100ML; G/100ML; G/100ML
500 INJECTION, SOLUTION INTRAVENOUS ONCE
Status: COMPLETED | OUTPATIENT
Start: 2021-01-01 | End: 2021-01-01

## 2021-01-01 RX ORDER — CISATRACURIUM BESYLATE 2 MG/ML
10 INJECTION, SOLUTION INTRAVENOUS ONCE
Status: DISCONTINUED | OUTPATIENT
Start: 2021-01-01 | End: 2021-01-01

## 2021-01-01 RX ORDER — NICOTINE POLACRILEX 4 MG
15 LOZENGE BUCCAL PRN
Status: DISCONTINUED | OUTPATIENT
Start: 2021-01-01 | End: 2021-01-01

## 2021-01-01 RX ORDER — DEXAMETHASONE SODIUM PHOSPHATE 10 MG/ML
6 INJECTION INTRAMUSCULAR; INTRAVENOUS ONCE
Status: COMPLETED | OUTPATIENT
Start: 2021-01-01 | End: 2021-01-01

## 2021-01-01 RX ORDER — MAGNESIUM SULFATE IN WATER 40 MG/ML
2000 INJECTION, SOLUTION INTRAVENOUS ONCE
Status: COMPLETED | OUTPATIENT
Start: 2021-01-01 | End: 2021-01-01

## 2021-01-01 RX ORDER — TRAZODONE HYDROCHLORIDE 50 MG/1
100 TABLET ORAL NIGHTLY
Status: DISCONTINUED | OUTPATIENT
Start: 2021-01-01 | End: 2021-01-01

## 2021-01-01 RX ORDER — SUCCINYLCHOLINE/SOD CL,ISO/PF 100 MG/5ML
SYRINGE (ML) INTRAVENOUS
Status: COMPLETED
Start: 2021-01-01 | End: 2021-01-01

## 2021-01-01 RX ORDER — DEXAMETHASONE SODIUM PHOSPHATE 4 MG/ML
6 INJECTION, SOLUTION INTRA-ARTICULAR; INTRALESIONAL; INTRAMUSCULAR; INTRAVENOUS; SOFT TISSUE 2 TIMES DAILY
Status: DISCONTINUED | OUTPATIENT
Start: 2021-01-01 | End: 2021-01-01

## 2021-01-01 RX ORDER — LACTULOSE 10 G/15ML
20 SOLUTION ORAL 3 TIMES DAILY
Status: DISCONTINUED | OUTPATIENT
Start: 2021-01-01 | End: 2021-01-01

## 2021-01-01 RX ORDER — SODIUM PHOSPHATE, DIBASIC AND SODIUM PHOSPHATE, MONOBASIC 7; 19 G/133ML; G/133ML
1 ENEMA RECTAL ONCE
Status: COMPLETED | OUTPATIENT
Start: 2021-01-01 | End: 2021-01-01

## 2021-01-01 RX ORDER — ASPIRIN 81 MG/1
81 TABLET ORAL DAILY
COMMUNITY

## 2021-01-01 RX ORDER — INSULIN GLARGINE 100 [IU]/ML
30 INJECTION, SOLUTION SUBCUTANEOUS 2 TIMES DAILY
Status: DISCONTINUED | OUTPATIENT
Start: 2021-01-01 | End: 2021-01-01

## 2021-01-01 RX ORDER — CODEINE PHOSPHATE AND GUAIFENESIN 10; 100 MG/5ML; MG/5ML
5 SOLUTION ORAL EVERY 4 HOURS PRN
Status: DISCONTINUED | OUTPATIENT
Start: 2021-01-01 | End: 2021-01-01

## 2021-01-01 RX ORDER — PANTOPRAZOLE SODIUM 40 MG/10ML
40 INJECTION, POWDER, LYOPHILIZED, FOR SOLUTION INTRAVENOUS DAILY
Status: DISCONTINUED | OUTPATIENT
Start: 2021-01-01 | End: 2021-01-01

## 2021-01-01 RX ORDER — POTASSIUM CHLORIDE 29.8 MG/ML
20 INJECTION INTRAVENOUS PRN
Status: DISCONTINUED | OUTPATIENT
Start: 2021-01-01 | End: 2021-01-01

## 2021-01-01 RX ORDER — SODIUM CHLORIDE 9 MG/ML
25 INJECTION, SOLUTION INTRAVENOUS PRN
Status: DISCONTINUED | OUTPATIENT
Start: 2021-01-01 | End: 2021-01-01

## 2021-01-01 RX ORDER — ACETAMINOPHEN 650 MG/1
650 SUPPOSITORY RECTAL EVERY 4 HOURS PRN
Status: DISCONTINUED | OUTPATIENT
Start: 2021-01-01 | End: 2021-01-01

## 2021-01-01 RX ORDER — INSULIN GLARGINE 100 [IU]/ML
35 INJECTION, SOLUTION SUBCUTANEOUS 2 TIMES DAILY
Status: DISCONTINUED | OUTPATIENT
Start: 2021-01-01 | End: 2021-01-01

## 2021-01-01 RX ORDER — HYDRALAZINE HYDROCHLORIDE 20 MG/ML
10 INJECTION INTRAMUSCULAR; INTRAVENOUS EVERY 6 HOURS PRN
Status: DISCONTINUED | OUTPATIENT
Start: 2021-01-01 | End: 2021-01-01

## 2021-01-01 RX ORDER — DEXAMETHASONE SODIUM PHOSPHATE 4 MG/ML
6 INJECTION, SOLUTION INTRA-ARTICULAR; INTRALESIONAL; INTRAMUSCULAR; INTRAVENOUS; SOFT TISSUE EVERY 24 HOURS
Status: DISCONTINUED | OUTPATIENT
Start: 2021-01-01 | End: 2021-01-01

## 2021-01-01 RX ORDER — PANTOPRAZOLE SODIUM 40 MG/1
40 TABLET, DELAYED RELEASE ORAL
Status: DISCONTINUED | OUTPATIENT
Start: 2021-01-01 | End: 2021-01-01

## 2021-01-01 RX ORDER — KETOROLAC TROMETHAMINE 30 MG/ML
30 INJECTION, SOLUTION INTRAMUSCULAR; INTRAVENOUS EVERY 6 HOURS PRN
Status: DISPENSED | OUTPATIENT
Start: 2021-01-01 | End: 2021-01-01

## 2021-01-01 RX ORDER — INSULIN GLARGINE 100 [IU]/ML
25 INJECTION, SOLUTION SUBCUTANEOUS 2 TIMES DAILY
Status: DISCONTINUED | OUTPATIENT
Start: 2021-01-01 | End: 2021-01-01

## 2021-01-01 RX ORDER — CLONAZEPAM 1 MG/1
0.5 TABLET ORAL 3 TIMES DAILY
Status: DISCONTINUED | OUTPATIENT
Start: 2021-01-01 | End: 2021-01-01 | Stop reason: HOSPADM

## 2021-01-01 RX ORDER — POTASSIUM CHLORIDE 7.45 MG/ML
10 INJECTION INTRAVENOUS PRN
Status: DISCONTINUED | OUTPATIENT
Start: 2021-01-01 | End: 2021-01-01

## 2021-01-01 RX ORDER — PROPOFOL 10 MG/ML
5-50 INJECTION, EMULSION INTRAVENOUS
Status: DISCONTINUED | OUTPATIENT
Start: 2021-01-01 | End: 2021-01-01

## 2021-01-01 RX ORDER — CISATRACURIUM BESYLATE 2 MG/ML
10 INJECTION, SOLUTION INTRAVENOUS ONCE
Status: COMPLETED | OUTPATIENT
Start: 2021-01-01 | End: 2021-01-01

## 2021-01-01 RX ORDER — ONDANSETRON 2 MG/ML
4 INJECTION INTRAMUSCULAR; INTRAVENOUS EVERY 6 HOURS PRN
Status: DISCONTINUED | OUTPATIENT
Start: 2021-01-01 | End: 2021-01-01

## 2021-01-01 RX ORDER — DOPAMINE HYDROCHLORIDE 160 MG/100ML
2-20 INJECTION, SOLUTION INTRAVENOUS CONTINUOUS
Status: DISCONTINUED | OUTPATIENT
Start: 2021-01-01 | End: 2021-01-01

## 2021-01-01 RX ORDER — ETOMIDATE 2 MG/ML
INJECTION INTRAVENOUS
Status: COMPLETED
Start: 2021-01-01 | End: 2021-01-01

## 2021-01-01 RX ORDER — POLYETHYLENE GLYCOL 3350 17 G/17G
17 POWDER, FOR SOLUTION ORAL DAILY PRN
Status: DISCONTINUED | OUTPATIENT
Start: 2021-01-01 | End: 2021-01-01

## 2021-01-01 RX ORDER — LACTULOSE 10 G/15ML
20 SOLUTION ORAL 3 TIMES DAILY
Status: COMPLETED | OUTPATIENT
Start: 2021-01-01 | End: 2021-01-01

## 2021-01-01 RX ORDER — SODIUM CHLORIDE 0.9 % (FLUSH) 0.9 %
5-40 SYRINGE (ML) INJECTION PRN
Status: DISCONTINUED | OUTPATIENT
Start: 2021-01-01 | End: 2021-01-01

## 2021-01-01 RX ORDER — INSULIN GLARGINE 100 [IU]/ML
40 INJECTION, SOLUTION SUBCUTANEOUS NIGHTLY
Status: DISCONTINUED | OUTPATIENT
Start: 2021-01-01 | End: 2021-01-01

## 2021-01-01 RX ORDER — ONDANSETRON 4 MG/1
4 TABLET, ORALLY DISINTEGRATING ORAL EVERY 8 HOURS PRN
Status: DISCONTINUED | OUTPATIENT
Start: 2021-01-01 | End: 2021-01-01

## 2021-01-01 RX ORDER — ACETAMINOPHEN 325 MG/1
650 TABLET ORAL EVERY 6 HOURS PRN
Status: DISCONTINUED | OUTPATIENT
Start: 2021-01-01 | End: 2021-01-01

## 2021-01-01 RX ORDER — METOCLOPRAMIDE HYDROCHLORIDE 5 MG/ML
10 INJECTION INTRAMUSCULAR; INTRAVENOUS EVERY 6 HOURS
Status: COMPLETED | OUTPATIENT
Start: 2021-01-01 | End: 2021-01-01

## 2021-01-01 RX ORDER — SODIUM CHLORIDE 9 MG/ML
INJECTION, SOLUTION INTRAVENOUS
Status: DISCONTINUED
Start: 2021-01-01 | End: 2021-01-01

## 2021-01-01 RX ORDER — MORPHINE SULFATE 2 MG/ML
2 INJECTION, SOLUTION INTRAMUSCULAR; INTRAVENOUS
Status: DISCONTINUED | OUTPATIENT
Start: 2021-01-01 | End: 2021-01-01 | Stop reason: HOSPADM

## 2021-01-01 RX ORDER — INSULIN GLARGINE 100 [IU]/ML
40 INJECTION, SOLUTION SUBCUTANEOUS 2 TIMES DAILY
Status: DISCONTINUED | OUTPATIENT
Start: 2021-01-01 | End: 2021-01-01

## 2021-01-01 RX ADMIN — PIPERACILLIN SODIUM AND TAZOBACTAM SODIUM 3375 MG: 3; .375 INJECTION, POWDER, LYOPHILIZED, FOR SOLUTION INTRAVENOUS at 06:57

## 2021-01-01 RX ADMIN — POLYETHYLENE GLYCOL 3350 17 G: 17 POWDER, FOR SOLUTION ORAL at 08:35

## 2021-01-01 RX ADMIN — ACETAMINOPHEN 650 MG: 325 TABLET ORAL at 21:07

## 2021-01-01 RX ADMIN — IPRATROPIUM BROMIDE AND ALBUTEROL SULFATE 1 AMPULE: .5; 3 SOLUTION RESPIRATORY (INHALATION) at 04:40

## 2021-01-01 RX ADMIN — FENTANYL CITRATE 200 MCG/HR: 50 INJECTION INTRAVENOUS at 19:43

## 2021-01-01 RX ADMIN — PROPOFOL 40 MCG/KG/MIN: 10 INJECTION, EMULSION INTRAVENOUS at 04:10

## 2021-01-01 RX ADMIN — FENTANYL CITRATE 200 MCG/HR: 50 INJECTION INTRAVENOUS at 13:43

## 2021-01-01 RX ADMIN — PROPOFOL 50 MCG/KG/MIN: 10 INJECTION, EMULSION INTRAVENOUS at 08:51

## 2021-01-01 RX ADMIN — ACETAMINOPHEN 650 MG: 325 TABLET ORAL at 08:13

## 2021-01-01 RX ADMIN — VANCOMYCIN HYDROCHLORIDE 1250 MG: 5 INJECTION, POWDER, LYOPHILIZED, FOR SOLUTION INTRAVENOUS at 06:56

## 2021-01-01 RX ADMIN — PIPERACILLIN SODIUM AND TAZOBACTAM SODIUM 3375 MG: 3; .375 INJECTION, POWDER, LYOPHILIZED, FOR SOLUTION INTRAVENOUS at 05:41

## 2021-01-01 RX ADMIN — FENTANYL CITRATE 75 MCG/HR: 50 INJECTION INTRAVENOUS at 17:21

## 2021-01-01 RX ADMIN — LACTULOSE 20 G: 20 SOLUTION ORAL at 20:21

## 2021-01-01 RX ADMIN — DOCUSATE SODIUM 100 MG: 100 CAPSULE ORAL at 08:35

## 2021-01-01 RX ADMIN — SODIUM CHLORIDE, PRESERVATIVE FREE 10 ML: 5 INJECTION INTRAVENOUS at 09:28

## 2021-01-01 RX ADMIN — PANTOPRAZOLE SODIUM 40 MG: 40 TABLET, DELAYED RELEASE ORAL at 05:11

## 2021-01-01 RX ADMIN — DOCUSATE SODIUM 100 MG: 50 LIQUID ORAL at 21:28

## 2021-01-01 RX ADMIN — CLONAZEPAM 0.5 MG: 1 TABLET ORAL at 21:12

## 2021-01-01 RX ADMIN — ACETAMINOPHEN 650 MG: 325 TABLET ORAL at 20:43

## 2021-01-01 RX ADMIN — ENOXAPARIN SODIUM 60 MG: 60 INJECTION SUBCUTANEOUS at 08:59

## 2021-01-01 RX ADMIN — Medication 20 MEQ: at 11:18

## 2021-01-01 RX ADMIN — Medication 10 ML: at 23:03

## 2021-01-01 RX ADMIN — CHLORHEXIDINE GLUCONATE 15 ML: 1.2 RINSE ORAL at 21:00

## 2021-01-01 RX ADMIN — SODIUM CHLORIDE 1 MCG/KG/HR: 9 INJECTION, SOLUTION INTRAVENOUS at 14:54

## 2021-01-01 RX ADMIN — CHLORHEXIDINE GLUCONATE 15 ML: 1.2 RINSE ORAL at 19:53

## 2021-01-01 RX ADMIN — Medication 10 ML: at 20:12

## 2021-01-01 RX ADMIN — DEXAMETHASONE SODIUM PHOSPHATE 6 MG: 4 INJECTION, SOLUTION INTRAMUSCULAR; INTRAVENOUS at 20:01

## 2021-01-01 RX ADMIN — SODIUM CHLORIDE, SODIUM LACTATE, POTASSIUM CHLORIDE, AND CALCIUM CHLORIDE 500 ML: .6; .31; .03; .02 INJECTION, SOLUTION INTRAVENOUS at 14:58

## 2021-01-01 RX ADMIN — ENOXAPARIN SODIUM 105 MG: 120 INJECTION SUBCUTANEOUS at 22:35

## 2021-01-01 RX ADMIN — FENTANYL CITRATE 100 MCG/HR: 50 INJECTION INTRAVENOUS at 08:37

## 2021-01-01 RX ADMIN — CHLORHEXIDINE GLUCONATE 15 ML: 1.2 RINSE ORAL at 22:23

## 2021-01-01 RX ADMIN — SODIUM CHLORIDE 0.6 MCG/KG/HR: 9 INJECTION, SOLUTION INTRAVENOUS at 20:03

## 2021-01-01 RX ADMIN — POTASSIUM CHLORIDE 10 MEQ: 7.46 INJECTION, SOLUTION INTRAVENOUS at 08:31

## 2021-01-01 RX ADMIN — PANTOPRAZOLE SODIUM 40 MG: 40 INJECTION, POWDER, FOR SOLUTION INTRAVENOUS at 08:33

## 2021-01-01 RX ADMIN — PANTOPRAZOLE SODIUM 40 MG: 40 TABLET, DELAYED RELEASE ORAL at 09:00

## 2021-01-01 RX ADMIN — Medication 10 ML: at 21:35

## 2021-01-01 RX ADMIN — PROPOFOL 50 MCG/KG/MIN: 10 INJECTION, EMULSION INTRAVENOUS at 11:29

## 2021-01-01 RX ADMIN — SODIUM CHLORIDE 1 MCG/KG/HR: 9 INJECTION, SOLUTION INTRAVENOUS at 19:40

## 2021-01-01 RX ADMIN — PROPOFOL 35 MCG/KG/MIN: 10 INJECTION, EMULSION INTRAVENOUS at 22:06

## 2021-01-01 RX ADMIN — Medication 10 ML: at 08:52

## 2021-01-01 RX ADMIN — VANCOMYCIN HYDROCHLORIDE 1500 MG: 5 INJECTION, POWDER, LYOPHILIZED, FOR SOLUTION INTRAVENOUS at 08:52

## 2021-01-01 RX ADMIN — ENOXAPARIN SODIUM 105 MG: 120 INJECTION SUBCUTANEOUS at 08:52

## 2021-01-01 RX ADMIN — PANTOPRAZOLE SODIUM 40 MG: 40 INJECTION, POWDER, FOR SOLUTION INTRAVENOUS at 13:12

## 2021-01-01 RX ADMIN — SODIUM CHLORIDE, POTASSIUM CHLORIDE, SODIUM LACTATE AND CALCIUM CHLORIDE 999 ML/HR: 600; 310; 30; 20 INJECTION, SOLUTION INTRAVENOUS at 09:30

## 2021-01-01 RX ADMIN — INSULIN GLARGINE 35 UNITS: 100 INJECTION, SOLUTION SUBCUTANEOUS at 10:03

## 2021-01-01 RX ADMIN — INSULIN GLARGINE 25 UNITS: 100 INJECTION, SOLUTION SUBCUTANEOUS at 09:01

## 2021-01-01 RX ADMIN — ENOXAPARIN SODIUM 105 MG: 120 INJECTION SUBCUTANEOUS at 08:26

## 2021-01-01 RX ADMIN — PROPOFOL 50 MCG/KG/MIN: 10 INJECTION, EMULSION INTRAVENOUS at 06:04

## 2021-01-01 RX ADMIN — PIPERACILLIN SODIUM AND TAZOBACTAM SODIUM 3375 MG: 3; .375 INJECTION, POWDER, LYOPHILIZED, FOR SOLUTION INTRAVENOUS at 21:22

## 2021-01-01 RX ADMIN — SODIUM CHLORIDE, PRESERVATIVE FREE 10 ML: 5 INJECTION INTRAVENOUS at 08:11

## 2021-01-01 RX ADMIN — ETOMIDATE 20 MG: 20 INJECTION, SOLUTION INTRAVENOUS at 09:30

## 2021-01-01 RX ADMIN — PROPOFOL 40 MCG/KG/MIN: 10 INJECTION, EMULSION INTRAVENOUS at 04:29

## 2021-01-01 RX ADMIN — INSULIN GLARGINE 25 UNITS: 100 INJECTION, SOLUTION SUBCUTANEOUS at 20:00

## 2021-01-01 RX ADMIN — LORAZEPAM: 2 INJECTION INTRAMUSCULAR; INTRAVENOUS at 10:27

## 2021-01-01 RX ADMIN — CHLORHEXIDINE GLUCONATE 15 ML: 1.2 RINSE ORAL at 10:47

## 2021-01-01 RX ADMIN — FENTANYL CITRATE: 50 INJECTION, SOLUTION INTRAMUSCULAR; INTRAVENOUS at 10:00

## 2021-01-01 RX ADMIN — ACETAMINOPHEN 650 MG: 325 TABLET ORAL at 20:03

## 2021-01-01 RX ADMIN — CHLORHEXIDINE GLUCONATE 15 ML: 1.2 RINSE ORAL at 20:21

## 2021-01-01 RX ADMIN — LACTULOSE 20 G: 20 SOLUTION ORAL at 08:23

## 2021-01-01 RX ADMIN — TRAZODONE HYDROCHLORIDE 100 MG: 50 TABLET ORAL at 19:56

## 2021-01-01 RX ADMIN — DEXAMETHASONE SODIUM PHOSPHATE 6 MG: 4 INJECTION, SOLUTION INTRAMUSCULAR; INTRAVENOUS at 22:00

## 2021-01-01 RX ADMIN — PIPERACILLIN SODIUM AND TAZOBACTAM SODIUM 3375 MG: 3; .375 INJECTION, POWDER, LYOPHILIZED, FOR SOLUTION INTRAVENOUS at 04:40

## 2021-01-01 RX ADMIN — SODIUM CHLORIDE, PRESERVATIVE FREE 10 ML: 5 INJECTION INTRAVENOUS at 08:18

## 2021-01-01 RX ADMIN — SODIUM CHLORIDE, PRESERVATIVE FREE 10 ML: 5 INJECTION INTRAVENOUS at 08:53

## 2021-01-01 RX ADMIN — ACETAMINOPHEN 650 MG: 325 TABLET ORAL at 11:50

## 2021-01-01 RX ADMIN — PROPOFOL 35 MCG/KG/MIN: 10 INJECTION, EMULSION INTRAVENOUS at 07:30

## 2021-01-01 RX ADMIN — FENTANYL CITRATE 125 MCG/HR: 50 INJECTION INTRAVENOUS at 23:21

## 2021-01-01 RX ADMIN — INSULIN LISPRO 6 UNITS: 100 INJECTION, SOLUTION INTRAVENOUS; SUBCUTANEOUS at 11:59

## 2021-01-01 RX ADMIN — PROPOFOL 50 MCG/KG/MIN: 10 INJECTION, EMULSION INTRAVENOUS at 01:38

## 2021-01-01 RX ADMIN — PROPOFOL 50 MCG/KG/MIN: 10 INJECTION, EMULSION INTRAVENOUS at 15:03

## 2021-01-01 RX ADMIN — PROPOFOL 50 MCG/KG/MIN: 10 INJECTION, EMULSION INTRAVENOUS at 04:12

## 2021-01-01 RX ADMIN — ENOXAPARIN SODIUM 105 MG: 120 INJECTION SUBCUTANEOUS at 09:30

## 2021-01-01 RX ADMIN — ACETAMINOPHEN 650 MG: 325 TABLET ORAL at 17:14

## 2021-01-01 RX ADMIN — ENOXAPARIN SODIUM 105 MG: 120 INJECTION SUBCUTANEOUS at 10:15

## 2021-01-01 RX ADMIN — CHLORHEXIDINE GLUCONATE 15 ML: 1.2 RINSE ORAL at 20:11

## 2021-01-01 RX ADMIN — MUPIROCIN: 20 OINTMENT TOPICAL at 22:02

## 2021-01-01 RX ADMIN — FENTANYL CITRATE 125 MCG/HR: 50 INJECTION INTRAVENOUS at 02:19

## 2021-01-01 RX ADMIN — FENTANYL CITRATE 25 MCG/HR: 50 INJECTION INTRAVENOUS at 13:43

## 2021-01-01 RX ADMIN — DEXAMETHASONE SODIUM PHOSPHATE 6 MG: 4 INJECTION, SOLUTION INTRAMUSCULAR; INTRAVENOUS at 08:04

## 2021-01-01 RX ADMIN — CLONAZEPAM 0.5 MG: 1 TABLET ORAL at 08:49

## 2021-01-01 RX ADMIN — PROPOFOL 35 MCG/KG/MIN: 10 INJECTION, EMULSION INTRAVENOUS at 16:43

## 2021-01-01 RX ADMIN — INSULIN GLARGINE 15 UNITS: 100 INJECTION, SOLUTION SUBCUTANEOUS at 20:13

## 2021-01-01 RX ADMIN — DEXAMETHASONE SODIUM PHOSPHATE 6 MG: 4 INJECTION, SOLUTION INTRAMUSCULAR; INTRAVENOUS at 09:00

## 2021-01-01 RX ADMIN — Medication 10 ML: at 09:03

## 2021-01-01 RX ADMIN — DEXAMETHASONE SODIUM PHOSPHATE 6 MG: 4 INJECTION, SOLUTION INTRAMUSCULAR; INTRAVENOUS at 08:34

## 2021-01-01 RX ADMIN — ONDANSETRON 4 MG: 2 INJECTION INTRAMUSCULAR; INTRAVENOUS at 10:34

## 2021-01-01 RX ADMIN — PROPOFOL 40 MCG/KG/MIN: 10 INJECTION, EMULSION INTRAVENOUS at 20:24

## 2021-01-01 RX ADMIN — PROPOFOL 35 MCG/KG/MIN: 10 INJECTION, EMULSION INTRAVENOUS at 05:32

## 2021-01-01 RX ADMIN — POTASSIUM CHLORIDE 10 MEQ: 7.46 INJECTION, SOLUTION INTRAVENOUS at 06:57

## 2021-01-01 RX ADMIN — PROPOFOL 35 MCG/KG/MIN: 10 INJECTION, EMULSION INTRAVENOUS at 18:21

## 2021-01-01 RX ADMIN — POLYETHYLENE GLYCOL 3350 17 G: 17 POWDER, FOR SOLUTION ORAL at 08:29

## 2021-01-01 RX ADMIN — DOCUSATE SODIUM 100 MG: 50 LIQUID ORAL at 19:55

## 2021-01-01 RX ADMIN — ACETAMINOPHEN 650 MG: 325 TABLET ORAL at 21:27

## 2021-01-01 RX ADMIN — DOCUSATE SODIUM 100 MG: 50 LIQUID ORAL at 11:37

## 2021-01-01 RX ADMIN — ONDANSETRON 4 MG: 2 INJECTION INTRAMUSCULAR; INTRAVENOUS at 05:46

## 2021-01-01 RX ADMIN — Medication 10 ML: at 08:17

## 2021-01-01 RX ADMIN — CLONAZEPAM 0.5 MG: 1 TABLET ORAL at 08:55

## 2021-01-01 RX ADMIN — MUPIROCIN: 20 OINTMENT TOPICAL at 13:54

## 2021-01-01 RX ADMIN — IPRATROPIUM BROMIDE AND ALBUTEROL SULFATE 1 AMPULE: .5; 3 SOLUTION RESPIRATORY (INHALATION) at 15:17

## 2021-01-01 RX ADMIN — POLYETHYLENE GLYCOL 3350 17 G: 17 POWDER, FOR SOLUTION ORAL at 11:37

## 2021-01-01 RX ADMIN — INSULIN LISPRO 6 UNITS: 100 INJECTION, SOLUTION INTRAVENOUS; SUBCUTANEOUS at 08:47

## 2021-01-01 RX ADMIN — DEXAMETHASONE SODIUM PHOSPHATE 6 MG: 4 INJECTION, SOLUTION INTRAMUSCULAR; INTRAVENOUS at 08:40

## 2021-01-01 RX ADMIN — PROPOFOL 40 MCG/KG/MIN: 10 INJECTION, EMULSION INTRAVENOUS at 21:05

## 2021-01-01 RX ADMIN — INSULIN GLARGINE 25 UNITS: 100 INJECTION, SOLUTION SUBCUTANEOUS at 20:32

## 2021-01-01 RX ADMIN — ACETAMINOPHEN 650 MG: 325 TABLET ORAL at 17:48

## 2021-01-01 RX ADMIN — PIPERACILLIN SODIUM AND TAZOBACTAM SODIUM 3375 MG: 3; .375 INJECTION, POWDER, LYOPHILIZED, FOR SOLUTION INTRAVENOUS at 13:32

## 2021-01-01 RX ADMIN — HYDRALAZINE HYDROCHLORIDE 10 MG: 20 INJECTION INTRAMUSCULAR; INTRAVENOUS at 10:40

## 2021-01-01 RX ADMIN — FENTANYL CITRATE 200 MCG/HR: 50 INJECTION INTRAVENOUS at 10:43

## 2021-01-01 RX ADMIN — Medication 10 ML: at 21:59

## 2021-01-01 RX ADMIN — PROPOFOL 50 MCG/KG/MIN: 10 INJECTION, EMULSION INTRAVENOUS at 08:49

## 2021-01-01 RX ADMIN — DEXAMETHASONE SODIUM PHOSPHATE 6 MG: 4 INJECTION, SOLUTION INTRAMUSCULAR; INTRAVENOUS at 08:35

## 2021-01-01 RX ADMIN — DEXAMETHASONE SODIUM PHOSPHATE 6 MG: 4 INJECTION, SOLUTION INTRAMUSCULAR; INTRAVENOUS at 08:59

## 2021-01-01 RX ADMIN — PROPOFOL 40 MCG/KG/MIN: 10 INJECTION, EMULSION INTRAVENOUS at 22:58

## 2021-01-01 RX ADMIN — IOPAMIDOL 100 ML: 755 INJECTION, SOLUTION INTRAVENOUS at 14:18

## 2021-01-01 RX ADMIN — PANTOPRAZOLE SODIUM 40 MG: 40 INJECTION, POWDER, FOR SOLUTION INTRAVENOUS at 08:05

## 2021-01-01 RX ADMIN — SODIUM CHLORIDE 1000 ML: 9 INJECTION, SOLUTION INTRAVENOUS at 03:58

## 2021-01-01 RX ADMIN — INSULIN GLARGINE 40 UNITS: 100 INJECTION, SOLUTION SUBCUTANEOUS at 11:58

## 2021-01-01 RX ADMIN — DEXTROSE MONOHYDRATE 12.5 G: 25 INJECTION, SOLUTION INTRAVENOUS at 01:34

## 2021-01-01 RX ADMIN — FENTANYL CITRATE 200 MCG/HR: 50 INJECTION INTRAVENOUS at 16:47

## 2021-01-01 RX ADMIN — FENTANYL CITRATE 200 MCG/HR: 50 INJECTION INTRAVENOUS at 09:20

## 2021-01-01 RX ADMIN — ENOXAPARIN SODIUM 30 MG: 30 INJECTION SUBCUTANEOUS at 08:35

## 2021-01-01 RX ADMIN — PROPOFOL 35 MCG/KG/MIN: 10 INJECTION, EMULSION INTRAVENOUS at 14:12

## 2021-01-01 RX ADMIN — CHLORHEXIDINE GLUCONATE 15 ML: 1.2 RINSE ORAL at 08:42

## 2021-01-01 RX ADMIN — PROPOFOL 40 MCG/KG/MIN: 10 INJECTION, EMULSION INTRAVENOUS at 10:32

## 2021-01-01 RX ADMIN — SODIUM CHLORIDE 0.8 MCG/KG/HR: 9 INJECTION, SOLUTION INTRAVENOUS at 08:11

## 2021-01-01 RX ADMIN — INSULIN LISPRO 3 UNITS: 100 INJECTION, SOLUTION INTRAVENOUS; SUBCUTANEOUS at 16:58

## 2021-01-01 RX ADMIN — FENTANYL CITRATE 175 MCG/HR: 50 INJECTION INTRAVENOUS at 22:03

## 2021-01-01 RX ADMIN — Medication 10 ML: at 22:25

## 2021-01-01 RX ADMIN — ENOXAPARIN SODIUM 30 MG: 30 INJECTION SUBCUTANEOUS at 20:06

## 2021-01-01 RX ADMIN — ENOXAPARIN SODIUM 105 MG: 120 INJECTION SUBCUTANEOUS at 20:43

## 2021-01-01 RX ADMIN — PIPERACILLIN SODIUM AND TAZOBACTAM SODIUM 3375 MG: 3; .375 INJECTION, POWDER, LYOPHILIZED, FOR SOLUTION INTRAVENOUS at 10:46

## 2021-01-01 RX ADMIN — ACETAMINOPHEN 650 MG: 650 SUPPOSITORY RECTAL at 21:40

## 2021-01-01 RX ADMIN — SODIUM CHLORIDE 1 MCG/KG/HR: 9 INJECTION, SOLUTION INTRAVENOUS at 18:23

## 2021-01-01 RX ADMIN — Medication 10 ML: at 09:01

## 2021-01-01 RX ADMIN — PANTOPRAZOLE SODIUM 40 MG: 40 INJECTION, POWDER, FOR SOLUTION INTRAVENOUS at 09:30

## 2021-01-01 RX ADMIN — INSULIN LISPRO 3 UNITS: 100 INJECTION, SOLUTION INTRAVENOUS; SUBCUTANEOUS at 17:13

## 2021-01-01 RX ADMIN — SODIUM CHLORIDE 1 MCG/KG/HR: 9 INJECTION, SOLUTION INTRAVENOUS at 16:08

## 2021-01-01 RX ADMIN — SODIUM CHLORIDE 0.4 MCG/KG/HR: 9 INJECTION, SOLUTION INTRAVENOUS at 14:45

## 2021-01-01 RX ADMIN — PROPOFOL 50 MCG/KG/MIN: 10 INJECTION, EMULSION INTRAVENOUS at 23:45

## 2021-01-01 RX ADMIN — METOCLOPRAMIDE HYDROCHLORIDE 10 MG: 5 INJECTION INTRAMUSCULAR; INTRAVENOUS at 10:04

## 2021-01-01 RX ADMIN — ENOXAPARIN SODIUM 30 MG: 30 INJECTION SUBCUTANEOUS at 09:28

## 2021-01-01 RX ADMIN — MUPIROCIN: 20 OINTMENT TOPICAL at 21:21

## 2021-01-01 RX ADMIN — SODIUM CHLORIDE, PRESERVATIVE FREE 10 ML: 5 INJECTION INTRAVENOUS at 08:41

## 2021-01-01 RX ADMIN — PROPOFOL 45 MCG/KG/MIN: 10 INJECTION, EMULSION INTRAVENOUS at 08:28

## 2021-01-01 RX ADMIN — FENTANYL CITRATE 100 MCG/HR: 50 INJECTION INTRAVENOUS at 15:09

## 2021-01-01 RX ADMIN — ACETAMINOPHEN 650 MG: 650 SUPPOSITORY RECTAL at 02:17

## 2021-01-01 RX ADMIN — PIPERACILLIN SODIUM AND TAZOBACTAM SODIUM 3375 MG: 3; .375 INJECTION, POWDER, LYOPHILIZED, FOR SOLUTION INTRAVENOUS at 23:07

## 2021-01-01 RX ADMIN — SODIUM CHLORIDE, POTASSIUM CHLORIDE, SODIUM LACTATE AND CALCIUM CHLORIDE 500 ML: 600; 310; 30; 20 INJECTION, SOLUTION INTRAVENOUS at 14:58

## 2021-01-01 RX ADMIN — Medication 2 MCG/MIN: at 00:30

## 2021-01-01 RX ADMIN — Medication 10 ML: at 08:39

## 2021-01-01 RX ADMIN — FENTANYL CITRATE 150 MCG/HR: 50 INJECTION INTRAVENOUS at 17:01

## 2021-01-01 RX ADMIN — METOCLOPRAMIDE HYDROCHLORIDE 10 MG: 5 INJECTION INTRAMUSCULAR; INTRAVENOUS at 20:24

## 2021-01-01 RX ADMIN — FENTANYL CITRATE 150 MCG/HR: 50 INJECTION INTRAVENOUS at 02:38

## 2021-01-01 RX ADMIN — FUROSEMIDE 20 MG: 10 INJECTION INTRAMUSCULAR; INTRAVENOUS at 10:19

## 2021-01-01 RX ADMIN — SODIUM CHLORIDE, PRESERVATIVE FREE 10 ML: 5 INJECTION INTRAVENOUS at 08:36

## 2021-01-01 RX ADMIN — PANTOPRAZOLE SODIUM 40 MG: 40 INJECTION, POWDER, FOR SOLUTION INTRAVENOUS at 08:49

## 2021-01-01 RX ADMIN — CHLORHEXIDINE GLUCONATE 15 ML: 1.2 RINSE ORAL at 22:00

## 2021-01-01 RX ADMIN — PROPOFOL 50 MCG/KG/MIN: 10 INJECTION, EMULSION INTRAVENOUS at 07:42

## 2021-01-01 RX ADMIN — DOCUSATE SODIUM 100 MG: 50 LIQUID ORAL at 21:39

## 2021-01-01 RX ADMIN — METOCLOPRAMIDE HYDROCHLORIDE 10 MG: 5 INJECTION INTRAMUSCULAR; INTRAVENOUS at 08:23

## 2021-01-01 RX ADMIN — SODIUM CHLORIDE 0.4 MCG/KG/HR: 9 INJECTION, SOLUTION INTRAVENOUS at 02:32

## 2021-01-01 RX ADMIN — Medication 3 MCG/MIN: at 21:54

## 2021-01-01 RX ADMIN — INSULIN GLARGINE 35 UNITS: 100 INJECTION, SOLUTION SUBCUTANEOUS at 09:31

## 2021-01-01 RX ADMIN — SODIUM CHLORIDE, SODIUM LACTATE, POTASSIUM CHLORIDE, AND CALCIUM CHLORIDE 500 ML: .6; .31; .03; .02 INJECTION, SOLUTION INTRAVENOUS at 00:30

## 2021-01-01 RX ADMIN — IPRATROPIUM BROMIDE AND ALBUTEROL SULFATE 1 AMPULE: .5; 3 SOLUTION RESPIRATORY (INHALATION) at 20:15

## 2021-01-01 RX ADMIN — PIPERACILLIN SODIUM AND TAZOBACTAM SODIUM 3375 MG: 3; .375 INJECTION, POWDER, LYOPHILIZED, FOR SOLUTION INTRAVENOUS at 14:43

## 2021-01-01 RX ADMIN — Medication 10 ML: at 08:53

## 2021-01-01 RX ADMIN — DEXAMETHASONE SODIUM PHOSPHATE 6 MG: 4 INJECTION, SOLUTION INTRAMUSCULAR; INTRAVENOUS at 08:07

## 2021-01-01 RX ADMIN — INSULIN GLARGINE 25 UNITS: 100 INJECTION, SOLUTION SUBCUTANEOUS at 08:07

## 2021-01-01 RX ADMIN — FENTANYL CITRATE 150 MCG/HR: 50 INJECTION INTRAVENOUS at 20:12

## 2021-01-01 RX ADMIN — PANTOPRAZOLE SODIUM 40 MG: 40 INJECTION, POWDER, FOR SOLUTION INTRAVENOUS at 08:52

## 2021-01-01 RX ADMIN — CHLORHEXIDINE GLUCONATE 15 ML: 1.2 RINSE ORAL at 19:56

## 2021-01-01 RX ADMIN — PIPERACILLIN SODIUM AND TAZOBACTAM SODIUM 3375 MG: 3; .375 INJECTION, POWDER, LYOPHILIZED, FOR SOLUTION INTRAVENOUS at 06:00

## 2021-01-01 RX ADMIN — LACTULOSE 20 G: 20 SOLUTION ORAL at 10:47

## 2021-01-01 RX ADMIN — SODIUM CHLORIDE 1.2 MCG/KG/HR: 9 INJECTION, SOLUTION INTRAVENOUS at 11:50

## 2021-01-01 RX ADMIN — ACETAMINOPHEN 650 MG: 650 SUPPOSITORY RECTAL at 08:24

## 2021-01-01 RX ADMIN — ENOXAPARIN SODIUM 30 MG: 30 INJECTION SUBCUTANEOUS at 20:00

## 2021-01-01 RX ADMIN — PIPERACILLIN SODIUM AND TAZOBACTAM SODIUM 3375 MG: 3; .375 INJECTION, POWDER, LYOPHILIZED, FOR SOLUTION INTRAVENOUS at 10:15

## 2021-01-01 RX ADMIN — CLONAZEPAM 0.5 MG: 1 TABLET ORAL at 20:22

## 2021-01-01 RX ADMIN — TRAZODONE HYDROCHLORIDE 100 MG: 50 TABLET ORAL at 20:43

## 2021-01-01 RX ADMIN — DOCUSATE SODIUM 100 MG: 100 CAPSULE ORAL at 20:01

## 2021-01-01 RX ADMIN — ENOXAPARIN SODIUM 30 MG: 30 INJECTION SUBCUTANEOUS at 21:34

## 2021-01-01 RX ADMIN — PIPERACILLIN SODIUM AND TAZOBACTAM SODIUM 3375 MG: 3; .375 INJECTION, POWDER, LYOPHILIZED, FOR SOLUTION INTRAVENOUS at 06:32

## 2021-01-01 RX ADMIN — POTASSIUM CHLORIDE 10 MEQ: 7.46 INJECTION, SOLUTION INTRAVENOUS at 08:45

## 2021-01-01 RX ADMIN — DEXAMETHASONE SODIUM PHOSPHATE 6 MG: 4 INJECTION, SOLUTION INTRAMUSCULAR; INTRAVENOUS at 20:31

## 2021-01-01 RX ADMIN — SODIUM CHLORIDE 1.2 MCG/KG/HR: 9 INJECTION, SOLUTION INTRAVENOUS at 16:50

## 2021-01-01 RX ADMIN — ENOXAPARIN SODIUM 105 MG: 120 INJECTION SUBCUTANEOUS at 08:23

## 2021-01-01 RX ADMIN — PIPERACILLIN SODIUM AND TAZOBACTAM SODIUM 3375 MG: 3; .375 INJECTION, POWDER, LYOPHILIZED, FOR SOLUTION INTRAVENOUS at 14:41

## 2021-01-01 RX ADMIN — PROPOFOL 40 MCG/KG/MIN: 10 INJECTION, EMULSION INTRAVENOUS at 07:00

## 2021-01-01 RX ADMIN — METOCLOPRAMIDE HYDROCHLORIDE 10 MG: 5 INJECTION INTRAMUSCULAR; INTRAVENOUS at 21:13

## 2021-01-01 RX ADMIN — PIPERACILLIN SODIUM AND TAZOBACTAM SODIUM 3375 MG: 3; .375 INJECTION, POWDER, LYOPHILIZED, FOR SOLUTION INTRAVENOUS at 13:52

## 2021-01-01 RX ADMIN — MORPHINE SULFATE 2 MG: 2 INJECTION, SOLUTION INTRAMUSCULAR; INTRAVENOUS at 10:25

## 2021-01-01 RX ADMIN — INSULIN GLARGINE 40 UNITS: 100 INJECTION, SOLUTION SUBCUTANEOUS at 20:17

## 2021-01-01 RX ADMIN — INSULIN GLARGINE 25 UNITS: 100 INJECTION, SOLUTION SUBCUTANEOUS at 20:37

## 2021-01-01 RX ADMIN — METOCLOPRAMIDE HYDROCHLORIDE 10 MG: 5 INJECTION INTRAMUSCULAR; INTRAVENOUS at 08:49

## 2021-01-01 RX ADMIN — PROPOFOL 40 MCG/KG/MIN: 10 INJECTION, EMULSION INTRAVENOUS at 02:45

## 2021-01-01 RX ADMIN — CLONAZEPAM 0.5 MG: 1 TABLET ORAL at 13:23

## 2021-01-01 RX ADMIN — CLONAZEPAM 0.5 MG: 1 TABLET ORAL at 12:18

## 2021-01-01 RX ADMIN — Medication 10 ML: at 21:28

## 2021-01-01 RX ADMIN — Medication 10 ML: at 08:35

## 2021-01-01 RX ADMIN — FENTANYL CITRATE 200 MCG/HR: 50 INJECTION INTRAVENOUS at 06:09

## 2021-01-01 RX ADMIN — GUAIFENESIN AND CODEINE PHOSPHATE 5 ML: 100; 10 SOLUTION ORAL at 08:04

## 2021-01-01 RX ADMIN — SODIUM CHLORIDE, PRESERVATIVE FREE 10 ML: 5 INJECTION INTRAVENOUS at 08:49

## 2021-01-01 RX ADMIN — INSULIN GLARGINE 15 UNITS: 100 INJECTION, SOLUTION SUBCUTANEOUS at 08:49

## 2021-01-01 RX ADMIN — INSULIN GLARGINE 25 UNITS: 100 INJECTION, SOLUTION SUBCUTANEOUS at 08:21

## 2021-01-01 RX ADMIN — CLONAZEPAM 0.5 MG: 1 TABLET ORAL at 13:32

## 2021-01-01 RX ADMIN — GUAIFENESIN AND CODEINE PHOSPHATE 5 ML: 100; 10 SOLUTION ORAL at 16:50

## 2021-01-01 RX ADMIN — ENOXAPARIN SODIUM 105 MG: 120 INJECTION SUBCUTANEOUS at 19:53

## 2021-01-01 RX ADMIN — TRAZODONE HYDROCHLORIDE 100 MG: 50 TABLET ORAL at 22:24

## 2021-01-01 RX ADMIN — SODIUM CHLORIDE, PRESERVATIVE FREE 10 ML: 5 INJECTION INTRAVENOUS at 09:12

## 2021-01-01 RX ADMIN — PROPOFOL 50 MCG/KG/MIN: 10 INJECTION, EMULSION INTRAVENOUS at 14:33

## 2021-01-01 RX ADMIN — DOCUSATE SODIUM 100 MG: 100 CAPSULE ORAL at 20:43

## 2021-01-01 RX ADMIN — IPRATROPIUM BROMIDE AND ALBUTEROL SULFATE 1 AMPULE: .5; 3 SOLUTION RESPIRATORY (INHALATION) at 07:46

## 2021-01-01 RX ADMIN — SODIUM CHLORIDE 1000 ML: 9 INJECTION, SOLUTION INTRAVENOUS at 00:27

## 2021-01-01 RX ADMIN — ACETAMINOPHEN 650 MG: 650 SUPPOSITORY RECTAL at 22:01

## 2021-01-01 RX ADMIN — PROPOFOL 35 MCG/KG/MIN: 10 INJECTION, EMULSION INTRAVENOUS at 13:36

## 2021-01-01 RX ADMIN — DOCUSATE SODIUM 100 MG: 100 CAPSULE ORAL at 21:57

## 2021-01-01 RX ADMIN — SODIUM CHLORIDE, PRESERVATIVE FREE 10 ML: 5 INJECTION INTRAVENOUS at 09:04

## 2021-01-01 RX ADMIN — PROPOFOL 40 MCG/KG/MIN: 10 INJECTION, EMULSION INTRAVENOUS at 05:55

## 2021-01-01 RX ADMIN — SODIUM CHLORIDE 1 MCG/KG/HR: 9 INJECTION, SOLUTION INTRAVENOUS at 11:00

## 2021-01-01 RX ADMIN — PROPOFOL 50 MCG/KG/MIN: 10 INJECTION, EMULSION INTRAVENOUS at 23:54

## 2021-01-01 RX ADMIN — Medication 10 ML: at 21:00

## 2021-01-01 RX ADMIN — ENOXAPARIN SODIUM 30 MG: 30 INJECTION SUBCUTANEOUS at 21:58

## 2021-01-01 RX ADMIN — Medication 10 ML: at 08:08

## 2021-01-01 RX ADMIN — INSULIN LISPRO 3 UNITS: 100 INJECTION, SOLUTION INTRAVENOUS; SUBCUTANEOUS at 17:16

## 2021-01-01 RX ADMIN — VANCOMYCIN HYDROCHLORIDE 1750 MG: 1 INJECTION, POWDER, LYOPHILIZED, FOR SOLUTION INTRAVENOUS at 20:00

## 2021-01-01 RX ADMIN — ENOXAPARIN SODIUM 30 MG: 30 INJECTION SUBCUTANEOUS at 08:07

## 2021-01-01 RX ADMIN — DOCUSATE SODIUM 100 MG: 50 LIQUID ORAL at 21:12

## 2021-01-01 RX ADMIN — SODIUM CHLORIDE, PRESERVATIVE FREE 10 ML: 5 INJECTION INTRAVENOUS at 08:05

## 2021-01-01 RX ADMIN — PROPOFOL 40 MCG/KG/MIN: 10 INJECTION, EMULSION INTRAVENOUS at 17:16

## 2021-01-01 RX ADMIN — CHLORHEXIDINE GLUCONATE 15 ML: 1.2 RINSE ORAL at 08:53

## 2021-01-01 RX ADMIN — SODIUM CHLORIDE 1.4 MCG/KG/HR: 9 INJECTION, SOLUTION INTRAVENOUS at 12:30

## 2021-01-01 RX ADMIN — VANCOMYCIN HYDROCHLORIDE 1500 MG: 5 INJECTION, POWDER, LYOPHILIZED, FOR SOLUTION INTRAVENOUS at 10:43

## 2021-01-01 RX ADMIN — PROPOFOL 30 MCG/KG/MIN: 10 INJECTION, EMULSION INTRAVENOUS at 11:00

## 2021-01-01 RX ADMIN — POTASSIUM CHLORIDE 10 MEQ: 7.46 INJECTION, SOLUTION INTRAVENOUS at 08:28

## 2021-01-01 RX ADMIN — INSULIN GLARGINE 25 UNITS: 100 INJECTION, SOLUTION SUBCUTANEOUS at 19:51

## 2021-01-01 RX ADMIN — SODIUM CHLORIDE 1 MCG/KG/HR: 9 INJECTION, SOLUTION INTRAVENOUS at 15:38

## 2021-01-01 RX ADMIN — CLONAZEPAM 0.5 MG: 1 TABLET ORAL at 08:23

## 2021-01-01 RX ADMIN — PROPOFOL 50 MCG/KG/MIN: 10 INJECTION, EMULSION INTRAVENOUS at 19:50

## 2021-01-01 RX ADMIN — FENTANYL CITRATE 100 MCG: 50 INJECTION, SOLUTION INTRAMUSCULAR; INTRAVENOUS at 09:30

## 2021-01-01 RX ADMIN — DOCUSATE SODIUM 100 MG: 50 LIQUID ORAL at 10:47

## 2021-01-01 RX ADMIN — ENOXAPARIN SODIUM 105 MG: 120 INJECTION SUBCUTANEOUS at 20:03

## 2021-01-01 RX ADMIN — BISACODYL 10 MG: 10 SUPPOSITORY RECTAL at 20:23

## 2021-01-01 RX ADMIN — Medication 10 ML: at 10:11

## 2021-01-01 RX ADMIN — ACETAMINOPHEN 650 MG: 325 TABLET ORAL at 05:11

## 2021-01-01 RX ADMIN — INSULIN GLARGINE 15 UNITS: 100 INJECTION, SOLUTION SUBCUTANEOUS at 08:42

## 2021-01-01 RX ADMIN — INSULIN LISPRO 6 UNITS: 100 INJECTION, SOLUTION INTRAVENOUS; SUBCUTANEOUS at 17:00

## 2021-01-01 RX ADMIN — PIPERACILLIN SODIUM AND TAZOBACTAM SODIUM 3375 MG: 3; .375 INJECTION, POWDER, LYOPHILIZED, FOR SOLUTION INTRAVENOUS at 21:24

## 2021-01-01 RX ADMIN — ACETAMINOPHEN 650 MG: 325 TABLET ORAL at 14:15

## 2021-01-01 RX ADMIN — Medication 10 ML: at 09:19

## 2021-01-01 RX ADMIN — REMDESIVIR 100 MG: 5 INJECTION INTRAVENOUS at 13:05

## 2021-01-01 RX ADMIN — DOCUSATE SODIUM 100 MG: 50 LIQUID ORAL at 09:30

## 2021-01-01 RX ADMIN — ETOMIDATE 20 MG: 20 INJECTION, SOLUTION INTRAVENOUS at 12:50

## 2021-01-01 RX ADMIN — Medication 10 ML: at 09:31

## 2021-01-01 RX ADMIN — DEXAMETHASONE SODIUM PHOSPHATE 6 MG: 10 INJECTION INTRAMUSCULAR; INTRAVENOUS at 10:08

## 2021-01-01 RX ADMIN — Medication 10 ML: at 20:14

## 2021-01-01 RX ADMIN — INSULIN GLARGINE 35 UNITS: 100 INJECTION, SOLUTION SUBCUTANEOUS at 22:28

## 2021-01-01 RX ADMIN — PROPOFOL 50 MCG/KG/MIN: 10 INJECTION, EMULSION INTRAVENOUS at 08:45

## 2021-01-01 RX ADMIN — PROPOFOL 50 MCG/KG/MIN: 10 INJECTION, EMULSION INTRAVENOUS at 21:01

## 2021-01-01 RX ADMIN — ENOXAPARIN SODIUM 60 MG: 60 INJECTION SUBCUTANEOUS at 20:17

## 2021-01-01 RX ADMIN — ENOXAPARIN SODIUM 105 MG: 120 INJECTION SUBCUTANEOUS at 08:48

## 2021-01-01 RX ADMIN — INSULIN GLARGINE 35 UNITS: 100 INJECTION, SOLUTION SUBCUTANEOUS at 00:34

## 2021-01-01 RX ADMIN — INSULIN GLARGINE 25 UNITS: 100 INJECTION, SOLUTION SUBCUTANEOUS at 21:52

## 2021-01-01 RX ADMIN — SODIUM CHLORIDE 0.4 MCG/KG/HR: 9 INJECTION, SOLUTION INTRAVENOUS at 11:50

## 2021-01-01 RX ADMIN — FENTANYL CITRATE 200 MCG/HR: 50 INJECTION INTRAVENOUS at 08:55

## 2021-01-01 RX ADMIN — DOCUSATE SODIUM 100 MG: 50 LIQUID ORAL at 22:23

## 2021-01-01 RX ADMIN — Medication 10 ML: at 08:36

## 2021-01-01 RX ADMIN — FENTANYL CITRATE 150 MCG/HR: 50 INJECTION INTRAVENOUS at 13:47

## 2021-01-01 RX ADMIN — INSULIN LISPRO 6 UNITS: 100 INJECTION, SOLUTION INTRAVENOUS; SUBCUTANEOUS at 12:27

## 2021-01-01 RX ADMIN — DEXAMETHASONE SODIUM PHOSPHATE 6 MG: 4 INJECTION, SOLUTION INTRAMUSCULAR; INTRAVENOUS at 08:45

## 2021-01-01 RX ADMIN — FENTANYL CITRATE 200 MCG/HR: 50 INJECTION INTRAVENOUS at 10:19

## 2021-01-01 RX ADMIN — SODIUM CHLORIDE 1.4 MCG/KG/HR: 9 INJECTION, SOLUTION INTRAVENOUS at 02:18

## 2021-01-01 RX ADMIN — REMDESIVIR 100 MG: 5 INJECTION INTRAVENOUS at 12:04

## 2021-01-01 RX ADMIN — GUAIFENESIN AND CODEINE PHOSPHATE 10 ML: 100; 10 SOLUTION ORAL at 22:06

## 2021-01-01 RX ADMIN — ENOXAPARIN SODIUM 105 MG: 120 INJECTION SUBCUTANEOUS at 21:20

## 2021-01-01 RX ADMIN — ENOXAPARIN SODIUM 30 MG: 30 INJECTION SUBCUTANEOUS at 09:12

## 2021-01-01 RX ADMIN — Medication 10 ML: at 08:06

## 2021-01-01 RX ADMIN — PROPOFOL 40 MCG/KG/MIN: 10 INJECTION, EMULSION INTRAVENOUS at 11:53

## 2021-01-01 RX ADMIN — ENOXAPARIN SODIUM 30 MG: 30 INJECTION SUBCUTANEOUS at 08:04

## 2021-01-01 RX ADMIN — ENOXAPARIN SODIUM 30 MG: 30 INJECTION SUBCUTANEOUS at 20:09

## 2021-01-01 RX ADMIN — VANCOMYCIN HYDROCHLORIDE 1500 MG: 5 INJECTION, POWDER, LYOPHILIZED, FOR SOLUTION INTRAVENOUS at 20:24

## 2021-01-01 RX ADMIN — FENTANYL CITRATE 200 MCG/HR: 50 INJECTION INTRAVENOUS at 00:58

## 2021-01-01 RX ADMIN — REMDESIVIR 100 MG: 5 INJECTION INTRAVENOUS at 14:40

## 2021-01-01 RX ADMIN — MAGNESIUM SULFATE HEPTAHYDRATE 2000 MG: 40 INJECTION, SOLUTION INTRAVENOUS at 10:17

## 2021-01-01 RX ADMIN — INSULIN GLARGINE 30 UNITS: 100 INJECTION, SOLUTION SUBCUTANEOUS at 09:02

## 2021-01-01 RX ADMIN — POTASSIUM CHLORIDE 10 MEQ: 7.46 INJECTION, SOLUTION INTRAVENOUS at 06:56

## 2021-01-01 RX ADMIN — TRAZODONE HYDROCHLORIDE 100 MG: 50 TABLET ORAL at 21:40

## 2021-01-01 RX ADMIN — REMDESIVIR 200 MG: 100 INJECTION, POWDER, LYOPHILIZED, FOR SOLUTION INTRAVENOUS at 14:16

## 2021-01-01 RX ADMIN — ENOXAPARIN SODIUM 30 MG: 30 INJECTION SUBCUTANEOUS at 08:09

## 2021-01-01 RX ADMIN — SODIUM CHLORIDE, PRESERVATIVE FREE 10 ML: 5 INJECTION INTRAVENOUS at 09:42

## 2021-01-01 RX ADMIN — FENTANYL CITRATE 25 MCG/HR: 50 INJECTION INTRAVENOUS at 10:30

## 2021-01-01 RX ADMIN — FENTANYL CITRATE 200 MCG/HR: 50 INJECTION INTRAVENOUS at 05:24

## 2021-01-01 RX ADMIN — ACETAMINOPHEN 650 MG: 325 TABLET ORAL at 08:35

## 2021-01-01 RX ADMIN — SODIUM CHLORIDE 1 MCG/KG/HR: 9 INJECTION, SOLUTION INTRAVENOUS at 23:55

## 2021-01-01 RX ADMIN — POLYETHYLENE GLYCOL 3350 17 G: 17 POWDER, FOR SOLUTION ORAL at 09:30

## 2021-01-01 RX ADMIN — IPRATROPIUM BROMIDE AND ALBUTEROL SULFATE 1 AMPULE: .5; 3 SOLUTION RESPIRATORY (INHALATION) at 11:15

## 2021-01-01 RX ADMIN — ENOXAPARIN SODIUM 30 MG: 30 INJECTION SUBCUTANEOUS at 20:36

## 2021-01-01 RX ADMIN — Medication 10 ML: at 08:24

## 2021-01-01 RX ADMIN — Medication 20 MEQ: at 07:49

## 2021-01-01 RX ADMIN — INSULIN LISPRO 12 UNITS: 100 INJECTION, SOLUTION INTRAVENOUS; SUBCUTANEOUS at 08:12

## 2021-01-01 RX ADMIN — Medication 10 ML: at 08:42

## 2021-01-01 RX ADMIN — PANTOPRAZOLE SODIUM 40 MG: 40 INJECTION, POWDER, FOR SOLUTION INTRAVENOUS at 09:28

## 2021-01-01 RX ADMIN — MUPIROCIN: 20 OINTMENT TOPICAL at 08:19

## 2021-01-01 RX ADMIN — FENTANYL CITRATE 200 MCG/HR: 50 INJECTION INTRAVENOUS at 08:57

## 2021-01-01 RX ADMIN — DOCUSATE SODIUM 100 MG: 50 LIQUID ORAL at 08:52

## 2021-01-01 RX ADMIN — DEXAMETHASONE SODIUM PHOSPHATE 6 MG: 4 INJECTION, SOLUTION INTRAMUSCULAR; INTRAVENOUS at 20:11

## 2021-01-01 RX ADMIN — PROPOFOL 50 MCG/KG/MIN: 10 INJECTION, EMULSION INTRAVENOUS at 19:36

## 2021-01-01 RX ADMIN — PIPERACILLIN SODIUM AND TAZOBACTAM SODIUM 3375 MG: 3; .375 INJECTION, POWDER, LYOPHILIZED, FOR SOLUTION INTRAVENOUS at 11:53

## 2021-01-01 RX ADMIN — SODIUM CHLORIDE 1 MCG/KG/HR: 9 INJECTION, SOLUTION INTRAVENOUS at 04:58

## 2021-01-01 RX ADMIN — Medication 10 ML: at 21:14

## 2021-01-01 RX ADMIN — PANTOPRAZOLE SODIUM 40 MG: 40 INJECTION, POWDER, FOR SOLUTION INTRAVENOUS at 10:15

## 2021-01-01 RX ADMIN — INSULIN LISPRO 6 UNITS: 100 INJECTION, SOLUTION INTRAVENOUS; SUBCUTANEOUS at 11:53

## 2021-01-01 RX ADMIN — DOCUSATE SODIUM 100 MG: 50 LIQUID ORAL at 08:23

## 2021-01-01 RX ADMIN — ENOXAPARIN SODIUM 60 MG: 60 INJECTION SUBCUTANEOUS at 09:28

## 2021-01-01 RX ADMIN — DEXAMETHASONE SODIUM PHOSPHATE 6 MG: 4 INJECTION, SOLUTION INTRAMUSCULAR; INTRAVENOUS at 21:57

## 2021-01-01 RX ADMIN — FENTANYL CITRATE 175 MCG/HR: 50 INJECTION INTRAVENOUS at 04:02

## 2021-01-01 RX ADMIN — FENTANYL CITRATE 25 MCG: 50 INJECTION, SOLUTION INTRAMUSCULAR; INTRAVENOUS at 10:19

## 2021-01-01 RX ADMIN — PROPOFOL 40 MCG/KG/MIN: 10 INJECTION, EMULSION INTRAVENOUS at 19:56

## 2021-01-01 RX ADMIN — PIPERACILLIN SODIUM AND TAZOBACTAM SODIUM 3375 MG: 3; .375 INJECTION, POWDER, LYOPHILIZED, FOR SOLUTION INTRAVENOUS at 04:15

## 2021-01-01 RX ADMIN — INSULIN GLARGINE 25 UNITS: 100 INJECTION, SOLUTION SUBCUTANEOUS at 20:08

## 2021-01-01 RX ADMIN — ENOXAPARIN SODIUM 105 MG: 120 INJECTION SUBCUTANEOUS at 20:22

## 2021-01-01 RX ADMIN — PIPERACILLIN SODIUM AND TAZOBACTAM SODIUM 3375 MG: 3; .375 INJECTION, POWDER, LYOPHILIZED, FOR SOLUTION INTRAVENOUS at 02:32

## 2021-01-01 RX ADMIN — Medication 5 MCG/MIN: at 11:14

## 2021-01-01 RX ADMIN — PIPERACILLIN SODIUM AND TAZOBACTAM SODIUM 3375 MG: 3; .375 INJECTION, POWDER, LYOPHILIZED, FOR SOLUTION INTRAVENOUS at 11:13

## 2021-01-01 RX ADMIN — PANTOPRAZOLE SODIUM 40 MG: 40 INJECTION, POWDER, FOR SOLUTION INTRAVENOUS at 09:12

## 2021-01-01 RX ADMIN — IPRATROPIUM BROMIDE AND ALBUTEROL SULFATE 1 AMPULE: .5; 3 SOLUTION RESPIRATORY (INHALATION) at 20:09

## 2021-01-01 RX ADMIN — PROPOFOL 40 MCG/KG/MIN: 10 INJECTION, EMULSION INTRAVENOUS at 00:26

## 2021-01-01 RX ADMIN — PIPERACILLIN SODIUM AND TAZOBACTAM SODIUM 3375 MG: 3; .375 INJECTION, POWDER, LYOPHILIZED, FOR SOLUTION INTRAVENOUS at 21:12

## 2021-01-01 RX ADMIN — INSULIN GLARGINE 25 UNITS: 100 INJECTION, SOLUTION SUBCUTANEOUS at 08:10

## 2021-01-01 RX ADMIN — PROPOFOL 50 MCG/KG/MIN: 10 INJECTION, EMULSION INTRAVENOUS at 05:16

## 2021-01-01 RX ADMIN — INSULIN GLARGINE 30 UNITS: 100 INJECTION, SOLUTION SUBCUTANEOUS at 20:33

## 2021-01-01 RX ADMIN — INSULIN LISPRO 6 UNITS: 100 INJECTION, SOLUTION INTRAVENOUS; SUBCUTANEOUS at 11:35

## 2021-01-01 RX ADMIN — TRAZODONE HYDROCHLORIDE 100 MG: 50 TABLET ORAL at 20:01

## 2021-01-01 RX ADMIN — CHLORHEXIDINE GLUCONATE 15 ML: 1.2 RINSE ORAL at 21:12

## 2021-01-01 RX ADMIN — SODIUM CHLORIDE, PRESERVATIVE FREE 10 ML: 5 INJECTION INTRAVENOUS at 08:24

## 2021-01-01 RX ADMIN — PROPOFOL 50 MCG/KG/MIN: 10 INJECTION, EMULSION INTRAVENOUS at 08:35

## 2021-01-01 RX ADMIN — PROPOFOL 50 MCG/KG/MIN: 10 INJECTION, EMULSION INTRAVENOUS at 14:54

## 2021-01-01 RX ADMIN — INSULIN LISPRO 9 UNITS: 100 INJECTION, SOLUTION INTRAVENOUS; SUBCUTANEOUS at 16:55

## 2021-01-01 RX ADMIN — FENTANYL CITRATE 200 MCG/HR: 50 INJECTION INTRAVENOUS at 04:34

## 2021-01-01 RX ADMIN — INSULIN LISPRO 3 UNITS: 100 INJECTION, SOLUTION INTRAVENOUS; SUBCUTANEOUS at 16:51

## 2021-01-01 RX ADMIN — POTASSIUM CHLORIDE 10 MEQ: 7.46 INJECTION, SOLUTION INTRAVENOUS at 09:45

## 2021-01-01 RX ADMIN — PANTOPRAZOLE SODIUM 40 MG: 40 INJECTION, POWDER, FOR SOLUTION INTRAVENOUS at 08:43

## 2021-01-01 RX ADMIN — ENOXAPARIN SODIUM 105 MG: 120 INJECTION SUBCUTANEOUS at 21:30

## 2021-01-01 RX ADMIN — SODIUM CHLORIDE 1000 ML: 9 INJECTION, SOLUTION INTRAVENOUS at 20:25

## 2021-01-01 RX ADMIN — FENTANYL CITRATE 200 MCG/HR: 50 INJECTION INTRAVENOUS at 23:09

## 2021-01-01 RX ADMIN — SODIUM CHLORIDE, PRESERVATIVE FREE 10 ML: 5 INJECTION INTRAVENOUS at 08:17

## 2021-01-01 RX ADMIN — Medication 10 ML: at 20:32

## 2021-01-01 RX ADMIN — SODIUM BICARBONATE: 84 INJECTION, SOLUTION INTRAVENOUS at 00:39

## 2021-01-01 RX ADMIN — IPRATROPIUM BROMIDE AND ALBUTEROL SULFATE 1 AMPULE: .5; 3 SOLUTION RESPIRATORY (INHALATION) at 21:24

## 2021-01-01 RX ADMIN — DOCUSATE SODIUM 100 MG: 50 LIQUID ORAL at 20:21

## 2021-01-01 RX ADMIN — ENOXAPARIN SODIUM 105 MG: 120 INJECTION SUBCUTANEOUS at 08:32

## 2021-01-01 RX ADMIN — CISATRACURIUM BESYLATE 2 MCG/KG/MIN: 10 INJECTION, SOLUTION INTRAVENOUS at 16:50

## 2021-01-01 RX ADMIN — SODIUM CHLORIDE, PRESERVATIVE FREE 10 ML: 5 INJECTION INTRAVENOUS at 10:11

## 2021-01-01 RX ADMIN — DEXAMETHASONE SODIUM PHOSPHATE 6 MG: 4 INJECTION, SOLUTION INTRAMUSCULAR; INTRAVENOUS at 20:04

## 2021-01-01 RX ADMIN — PROPOFOL 50 MCG/KG/MIN: 10 INJECTION, EMULSION INTRAVENOUS at 03:54

## 2021-01-01 RX ADMIN — PANTOPRAZOLE SODIUM 40 MG: 40 INJECTION, POWDER, FOR SOLUTION INTRAVENOUS at 08:16

## 2021-01-01 RX ADMIN — PIPERACILLIN SODIUM AND TAZOBACTAM SODIUM 3375 MG: 3; .375 INJECTION, POWDER, LYOPHILIZED, FOR SOLUTION INTRAVENOUS at 08:51

## 2021-01-01 RX ADMIN — CHLORHEXIDINE GLUCONATE 15 ML: 1.2 RINSE ORAL at 08:52

## 2021-01-01 RX ADMIN — DEXAMETHASONE SODIUM PHOSPHATE 6 MG: 4 INJECTION, SOLUTION INTRAMUSCULAR; INTRAVENOUS at 20:42

## 2021-01-01 RX ADMIN — PROPOFOL 35 MCG/KG/MIN: 10 INJECTION, EMULSION INTRAVENOUS at 21:37

## 2021-01-01 RX ADMIN — IPRATROPIUM BROMIDE AND ALBUTEROL SULFATE 1 AMPULE: .5; 3 SOLUTION RESPIRATORY (INHALATION) at 20:24

## 2021-01-01 RX ADMIN — INSULIN LISPRO 9 UNITS: 100 INJECTION, SOLUTION INTRAVENOUS; SUBCUTANEOUS at 11:42

## 2021-01-01 RX ADMIN — VANCOMYCIN HYDROCHLORIDE 1250 MG: 5 INJECTION, POWDER, LYOPHILIZED, FOR SOLUTION INTRAVENOUS at 10:25

## 2021-01-01 RX ADMIN — INSULIN GLARGINE 15 UNITS: 100 INJECTION, SOLUTION SUBCUTANEOUS at 21:00

## 2021-01-01 RX ADMIN — ENOXAPARIN SODIUM 105 MG: 120 INJECTION SUBCUTANEOUS at 22:30

## 2021-01-01 RX ADMIN — IPRATROPIUM BROMIDE AND ALBUTEROL SULFATE 1 AMPULE: .5; 3 SOLUTION RESPIRATORY (INHALATION) at 20:36

## 2021-01-01 RX ADMIN — INSULIN GLARGINE 30 UNITS: 100 INJECTION, SOLUTION SUBCUTANEOUS at 10:03

## 2021-01-01 RX ADMIN — IPRATROPIUM BROMIDE AND ALBUTEROL SULFATE 1 AMPULE: .5; 3 SOLUTION RESPIRATORY (INHALATION) at 03:48

## 2021-01-01 RX ADMIN — SODIUM CHLORIDE 0.6 MCG/KG/HR: 9 INJECTION, SOLUTION INTRAVENOUS at 06:09

## 2021-01-01 RX ADMIN — DOCUSATE SODIUM 100 MG: 50 LIQUID ORAL at 20:03

## 2021-01-01 RX ADMIN — POLYETHYLENE GLYCOL 3350 17 G: 17 POWDER, FOR SOLUTION ORAL at 08:53

## 2021-01-01 RX ADMIN — DEXTROSE MONOHYDRATE 12.5 G: 25 INJECTION, SOLUTION INTRAVENOUS at 21:41

## 2021-01-01 RX ADMIN — CHLORHEXIDINE GLUCONATE 15 ML: 1.2 RINSE ORAL at 10:15

## 2021-01-01 RX ADMIN — PIPERACILLIN SODIUM AND TAZOBACTAM SODIUM 3375 MG: 3; .375 INJECTION, POWDER, LYOPHILIZED, FOR SOLUTION INTRAVENOUS at 13:23

## 2021-01-01 RX ADMIN — KETOROLAC TROMETHAMINE 30 MG: 30 INJECTION, SOLUTION INTRAMUSCULAR; INTRAVENOUS at 20:10

## 2021-01-01 RX ADMIN — FENTANYL CITRATE 20 MCG/HR: 50 INJECTION INTRAVENOUS at 18:19

## 2021-01-01 RX ADMIN — PANTOPRAZOLE SODIUM 40 MG: 40 INJECTION, POWDER, FOR SOLUTION INTRAVENOUS at 08:07

## 2021-01-01 RX ADMIN — PROPOFOL 50 MCG/KG/MIN: 10 INJECTION, EMULSION INTRAVENOUS at 03:59

## 2021-01-01 RX ADMIN — ENOXAPARIN SODIUM 30 MG: 30 INJECTION SUBCUTANEOUS at 20:14

## 2021-01-01 RX ADMIN — DOPAMINE HYDROCHLORIDE 2.5 MCG/KG/MIN: 160 INJECTION, SOLUTION INTRAVENOUS at 22:39

## 2021-01-01 RX ADMIN — LACTULOSE 20 G: 20 SOLUTION ORAL at 13:56

## 2021-01-01 RX ADMIN — Medication 10 ML: at 09:28

## 2021-01-01 RX ADMIN — INSULIN LISPRO 3 UNITS: 100 INJECTION, SOLUTION INTRAVENOUS; SUBCUTANEOUS at 12:02

## 2021-01-01 RX ADMIN — DEXAMETHASONE SODIUM PHOSPHATE 6 MG: 4 INJECTION, SOLUTION INTRAMUSCULAR; INTRAVENOUS at 21:27

## 2021-01-01 RX ADMIN — INSULIN GLARGINE 35 UNITS: 100 INJECTION, SOLUTION SUBCUTANEOUS at 20:09

## 2021-01-01 RX ADMIN — Medication 10 ML: at 21:32

## 2021-01-01 RX ADMIN — CISATRACURIUM BESYLATE 10 MG: 2 INJECTION INTRAVENOUS at 16:46

## 2021-01-01 RX ADMIN — SODIUM CHLORIDE 1.4 MCG/KG/HR: 9 INJECTION, SOLUTION INTRAVENOUS at 07:41

## 2021-01-01 RX ADMIN — VANCOMYCIN HYDROCHLORIDE 1500 MG: 5 INJECTION, POWDER, LYOPHILIZED, FOR SOLUTION INTRAVENOUS at 22:59

## 2021-01-01 RX ADMIN — LACTULOSE 20 G: 20 SOLUTION ORAL at 20:01

## 2021-01-01 RX ADMIN — DEXAMETHASONE SODIUM PHOSPHATE 6 MG: 4 INJECTION, SOLUTION INTRAMUSCULAR; INTRAVENOUS at 09:12

## 2021-01-01 RX ADMIN — INSULIN LISPRO 3 UNITS: 100 INJECTION, SOLUTION INTRAVENOUS; SUBCUTANEOUS at 17:15

## 2021-01-01 RX ADMIN — FENTANYL CITRATE 25 MCG: 50 INJECTION, SOLUTION INTRAMUSCULAR; INTRAVENOUS at 11:00

## 2021-01-01 RX ADMIN — PROPOFOL 50 MCG/KG/MIN: 10 INJECTION, EMULSION INTRAVENOUS at 00:57

## 2021-01-01 RX ADMIN — PIPERACILLIN SODIUM AND TAZOBACTAM SODIUM 3375 MG: 3; .375 INJECTION, POWDER, LYOPHILIZED, FOR SOLUTION INTRAVENOUS at 22:45

## 2021-01-01 RX ADMIN — POLYETHYLENE GLYCOL 3350 17 G: 17 POWDER, FOR SOLUTION ORAL at 08:32

## 2021-01-01 RX ADMIN — INSULIN GLARGINE 30 UNITS: 100 INJECTION, SOLUTION SUBCUTANEOUS at 08:36

## 2021-01-01 RX ADMIN — ENOXAPARIN SODIUM 30 MG: 30 INJECTION SUBCUTANEOUS at 20:31

## 2021-01-01 RX ADMIN — DEXAMETHASONE SODIUM PHOSPHATE 6 MG: 4 INJECTION, SOLUTION INTRAMUSCULAR; INTRAVENOUS at 20:13

## 2021-01-01 RX ADMIN — SODIUM CHLORIDE 1.4 MCG/KG/HR: 9 INJECTION, SOLUTION INTRAVENOUS at 14:48

## 2021-01-01 RX ADMIN — LACTULOSE 20 G: 20 SOLUTION ORAL at 13:50

## 2021-01-01 RX ADMIN — PIPERACILLIN SODIUM AND TAZOBACTAM SODIUM 3375 MG: 3; .375 INJECTION, POWDER, LYOPHILIZED, FOR SOLUTION INTRAVENOUS at 11:33

## 2021-01-01 RX ADMIN — INSULIN GLARGINE 25 UNITS: 100 INJECTION, SOLUTION SUBCUTANEOUS at 21:36

## 2021-01-01 RX ADMIN — ENOXAPARIN SODIUM 30 MG: 30 INJECTION SUBCUTANEOUS at 21:27

## 2021-01-01 RX ADMIN — FENTANYL CITRATE 100 MCG/HR: 50 INJECTION INTRAVENOUS at 09:54

## 2021-01-01 RX ADMIN — DEXAMETHASONE SODIUM PHOSPHATE 6 MG: 4 INJECTION INTRA-ARTICULAR; INTRALESIONAL; INTRAMUSCULAR; INTRAVENOUS; SOFT TISSUE at 13:01

## 2021-01-01 RX ADMIN — SODIUM CHLORIDE 1.4 MCG/KG/HR: 9 INJECTION, SOLUTION INTRAVENOUS at 23:29

## 2021-01-01 RX ADMIN — PROPOFOL 30 MCG/KG/MIN: 10 INJECTION, EMULSION INTRAVENOUS at 18:13

## 2021-01-01 RX ADMIN — ENOXAPARIN SODIUM 105 MG: 120 INJECTION SUBCUTANEOUS at 20:23

## 2021-01-01 RX ADMIN — PIPERACILLIN SODIUM AND TAZOBACTAM SODIUM 3375 MG: 3; .375 INJECTION, POWDER, LYOPHILIZED, FOR SOLUTION INTRAVENOUS at 23:59

## 2021-01-01 RX ADMIN — Medication 10 ML: at 20:04

## 2021-01-01 RX ADMIN — METOCLOPRAMIDE HYDROCHLORIDE 10 MG: 5 INJECTION INTRAMUSCULAR; INTRAVENOUS at 21:21

## 2021-01-01 RX ADMIN — MUPIROCIN: 20 OINTMENT TOPICAL at 09:41

## 2021-01-01 RX ADMIN — FENTANYL CITRATE 200 MCG/HR: 50 INJECTION INTRAVENOUS at 21:46

## 2021-01-01 RX ADMIN — DEXAMETHASONE SODIUM PHOSPHATE 6 MG: 4 INJECTION, SOLUTION INTRAMUSCULAR; INTRAVENOUS at 09:27

## 2021-01-01 RX ADMIN — Medication 20 MEQ: at 12:00

## 2021-01-01 RX ADMIN — PROPOFOL 50 MCG/KG/MIN: 10 INJECTION, EMULSION INTRAVENOUS at 15:15

## 2021-01-01 RX ADMIN — INSULIN GLARGINE 25 UNITS: 100 INJECTION, SOLUTION SUBCUTANEOUS at 09:19

## 2021-01-01 RX ADMIN — PIPERACILLIN SODIUM AND TAZOBACTAM SODIUM 3375 MG: 3; .375 INJECTION, POWDER, LYOPHILIZED, FOR SOLUTION INTRAVENOUS at 22:24

## 2021-01-01 RX ADMIN — PROPOFOL 35 MCG/KG/MIN: 10 INJECTION, EMULSION INTRAVENOUS at 09:53

## 2021-01-01 RX ADMIN — SODIUM CHLORIDE, POTASSIUM CHLORIDE, SODIUM LACTATE AND CALCIUM CHLORIDE 500 ML: 600; 310; 30; 20 INJECTION, SOLUTION INTRAVENOUS at 00:30

## 2021-01-01 RX ADMIN — TRAZODONE HYDROCHLORIDE 100 MG: 50 TABLET ORAL at 20:17

## 2021-01-01 RX ADMIN — FENTANYL CITRATE 200 MCG/HR: 50 INJECTION INTRAVENOUS at 23:32

## 2021-01-01 RX ADMIN — INSULIN LISPRO 3 UNITS: 100 INJECTION, SOLUTION INTRAVENOUS; SUBCUTANEOUS at 10:41

## 2021-01-01 RX ADMIN — Medication 10 ML: at 19:56

## 2021-01-01 RX ADMIN — IPRATROPIUM BROMIDE AND ALBUTEROL SULFATE 1 AMPULE: .5; 3 SOLUTION RESPIRATORY (INHALATION) at 13:43

## 2021-01-01 RX ADMIN — INSULIN GLARGINE 30 UNITS: 100 INJECTION, SOLUTION SUBCUTANEOUS at 08:40

## 2021-01-01 RX ADMIN — IPRATROPIUM BROMIDE AND ALBUTEROL SULFATE 1 AMPULE: .5; 3 SOLUTION RESPIRATORY (INHALATION) at 04:23

## 2021-01-01 RX ADMIN — Medication 10 ML: at 08:20

## 2021-01-01 RX ADMIN — TRAZODONE HYDROCHLORIDE 100 MG: 50 TABLET ORAL at 21:58

## 2021-01-01 RX ADMIN — ENOXAPARIN SODIUM 30 MG: 30 INJECTION SUBCUTANEOUS at 09:00

## 2021-01-01 RX ADMIN — DEXAMETHASONE SODIUM PHOSPHATE 6 MG: 4 INJECTION, SOLUTION INTRAMUSCULAR; INTRAVENOUS at 20:10

## 2021-01-01 RX ADMIN — INSULIN LISPRO 9 UNITS: 100 INJECTION, SOLUTION INTRAVENOUS; SUBCUTANEOUS at 09:18

## 2021-01-01 RX ADMIN — ENOXAPARIN SODIUM 60 MG: 60 INJECTION SUBCUTANEOUS at 13:12

## 2021-01-01 RX ADMIN — SODIUM CHLORIDE, PRESERVATIVE FREE 10 ML: 5 INJECTION INTRAVENOUS at 13:13

## 2021-01-01 RX ADMIN — Medication 5 ML: at 09:01

## 2021-01-01 RX ADMIN — METOCLOPRAMIDE HYDROCHLORIDE 10 MG: 5 INJECTION INTRAMUSCULAR; INTRAVENOUS at 21:30

## 2021-01-01 RX ADMIN — INSULIN LISPRO 3 UNITS: 100 INJECTION, SOLUTION INTRAVENOUS; SUBCUTANEOUS at 11:34

## 2021-01-01 RX ADMIN — INSULIN GLARGINE 30 UNITS: 100 INJECTION, SOLUTION SUBCUTANEOUS at 20:42

## 2021-01-01 RX ADMIN — PROPOFOL 40 MCG/KG/MIN: 10 INJECTION, EMULSION INTRAVENOUS at 02:09

## 2021-01-01 RX ADMIN — ACETAMINOPHEN 650 MG: 650 SUPPOSITORY RECTAL at 16:10

## 2021-01-01 RX ADMIN — ACETAMINOPHEN 650 MG: 325 TABLET ORAL at 13:05

## 2021-01-01 RX ADMIN — SODIUM CHLORIDE 1 MCG/KG/HR: 9 INJECTION, SOLUTION INTRAVENOUS at 20:31

## 2021-01-01 RX ADMIN — PROPOFOL 50 MCG/KG/MIN: 10 INJECTION, EMULSION INTRAVENOUS at 17:44

## 2021-01-01 RX ADMIN — CHLORHEXIDINE GLUCONATE 15 ML: 1.2 RINSE ORAL at 12:34

## 2021-01-01 RX ADMIN — SODIUM CHLORIDE 25 ML: 9 INJECTION, SOLUTION INTRAVENOUS at 20:24

## 2021-01-01 RX ADMIN — PANTOPRAZOLE SODIUM 40 MG: 40 TABLET, DELAYED RELEASE ORAL at 05:43

## 2021-01-01 RX ADMIN — METOCLOPRAMIDE HYDROCHLORIDE 10 MG: 5 INJECTION INTRAMUSCULAR; INTRAVENOUS at 20:22

## 2021-01-01 RX ADMIN — ACETAMINOPHEN 650 MG: 325 TABLET ORAL at 08:32

## 2021-01-01 RX ADMIN — IPRATROPIUM BROMIDE AND ALBUTEROL SULFATE 1 AMPULE: .5; 3 SOLUTION RESPIRATORY (INHALATION) at 20:16

## 2021-01-01 RX ADMIN — ENOXAPARIN SODIUM 30 MG: 30 INJECTION SUBCUTANEOUS at 08:58

## 2021-01-01 RX ADMIN — TRAZODONE HYDROCHLORIDE 100 MG: 50 TABLET ORAL at 20:10

## 2021-01-01 RX ADMIN — POLYETHYLENE GLYCOL 3350 17 G: 17 POWDER, FOR SOLUTION ORAL at 08:52

## 2021-01-01 RX ADMIN — FUROSEMIDE 20 MG: 10 INJECTION INTRAMUSCULAR; INTRAVENOUS at 09:45

## 2021-01-01 RX ADMIN — INSULIN LISPRO 3 UNITS: 100 INJECTION, SOLUTION INTRAVENOUS; SUBCUTANEOUS at 16:59

## 2021-01-01 RX ADMIN — INSULIN GLARGINE 25 UNITS: 100 INJECTION, SOLUTION SUBCUTANEOUS at 21:28

## 2021-01-01 RX ADMIN — CHLORHEXIDINE GLUCONATE 15 ML: 1.2 RINSE ORAL at 08:16

## 2021-01-01 RX ADMIN — IPRATROPIUM BROMIDE AND ALBUTEROL SULFATE 1 AMPULE: .5; 3 SOLUTION RESPIRATORY (INHALATION) at 04:54

## 2021-01-01 RX ADMIN — PIPERACILLIN SODIUM AND TAZOBACTAM SODIUM 3375 MG: 3; .375 INJECTION, POWDER, LYOPHILIZED, FOR SOLUTION INTRAVENOUS at 12:34

## 2021-01-01 RX ADMIN — FENTANYL CITRATE 175 MCG/HR: 50 INJECTION INTRAVENOUS at 16:06

## 2021-01-01 RX ADMIN — PROPOFOL 50 MCG/KG/MIN: 10 INJECTION, EMULSION INTRAVENOUS at 12:30

## 2021-01-01 RX ADMIN — Medication 10 ML: at 08:41

## 2021-01-01 RX ADMIN — ENOXAPARIN SODIUM 30 MG: 30 INJECTION SUBCUTANEOUS at 20:11

## 2021-01-01 RX ADMIN — PROPOFOL 50 MCG/KG/MIN: 10 INJECTION, EMULSION INTRAVENOUS at 18:02

## 2021-01-01 RX ADMIN — PROPOFOL 35 MCG/KG/MIN: 10 INJECTION, EMULSION INTRAVENOUS at 01:32

## 2021-01-01 RX ADMIN — IPRATROPIUM BROMIDE AND ALBUTEROL SULFATE 1 AMPULE: .5; 3 SOLUTION RESPIRATORY (INHALATION) at 12:09

## 2021-01-01 RX ADMIN — Medication 20 MEQ: at 08:49

## 2021-01-01 RX ADMIN — PROPOFOL 50 MCG/KG/MIN: 10 INJECTION, EMULSION INTRAVENOUS at 19:45

## 2021-01-01 RX ADMIN — PANTOPRAZOLE SODIUM 40 MG: 40 INJECTION, POWDER, FOR SOLUTION INTRAVENOUS at 08:59

## 2021-01-01 RX ADMIN — SODIUM CHLORIDE 0.6 MCG/KG/HR: 9 INJECTION, SOLUTION INTRAVENOUS at 09:53

## 2021-01-01 RX ADMIN — ENOXAPARIN SODIUM 105 MG: 120 INJECTION SUBCUTANEOUS at 08:31

## 2021-01-01 RX ADMIN — Medication 10 ML: at 20:43

## 2021-01-01 RX ADMIN — PIPERACILLIN SODIUM AND TAZOBACTAM SODIUM 3375 MG: 3; .375 INJECTION, POWDER, LYOPHILIZED, FOR SOLUTION INTRAVENOUS at 04:20

## 2021-01-01 RX ADMIN — ENOXAPARIN SODIUM 60 MG: 60 INJECTION SUBCUTANEOUS at 21:33

## 2021-01-01 RX ADMIN — PROPOFOL 50 MCG/KG/MIN: 10 INJECTION, EMULSION INTRAVENOUS at 17:31

## 2021-01-01 RX ADMIN — Medication 20 ML: at 09:04

## 2021-01-01 RX ADMIN — METOCLOPRAMIDE HYDROCHLORIDE 10 MG: 5 INJECTION INTRAMUSCULAR; INTRAVENOUS at 08:16

## 2021-01-01 RX ADMIN — PIPERACILLIN SODIUM AND TAZOBACTAM SODIUM 3375 MG: 3; .375 INJECTION, POWDER, LYOPHILIZED, FOR SOLUTION INTRAVENOUS at 20:30

## 2021-01-01 RX ADMIN — POLYETHYLENE GLYCOL 3350 17 G: 17 POWDER, FOR SOLUTION ORAL at 10:47

## 2021-01-01 RX ADMIN — INSULIN GLARGINE 30 UNITS: 100 INJECTION, SOLUTION SUBCUTANEOUS at 08:13

## 2021-01-01 RX ADMIN — FENTANYL CITRATE 200 MCG/HR: 50 INJECTION INTRAVENOUS at 04:27

## 2021-01-01 RX ADMIN — ONDANSETRON 4 MG: 2 INJECTION INTRAMUSCULAR; INTRAVENOUS at 13:06

## 2021-01-01 RX ADMIN — ENOXAPARIN SODIUM 105 MG: 120 INJECTION SUBCUTANEOUS at 08:15

## 2021-01-01 RX ADMIN — PIPERACILLIN SODIUM AND TAZOBACTAM SODIUM 3375 MG: 3; .375 INJECTION, POWDER, LYOPHILIZED, FOR SOLUTION INTRAVENOUS at 21:30

## 2021-01-01 RX ADMIN — SODIUM CHLORIDE 0.4 MCG/KG/HR: 9 INJECTION, SOLUTION INTRAVENOUS at 22:56

## 2021-01-01 RX ADMIN — CLONAZEPAM 0.5 MG: 1 TABLET ORAL at 15:47

## 2021-01-01 RX ADMIN — INSULIN GLARGINE 25 UNITS: 100 INJECTION, SOLUTION SUBCUTANEOUS at 08:12

## 2021-01-01 RX ADMIN — Medication 10 ML: at 20:18

## 2021-01-01 RX ADMIN — Medication 10 ML: at 20:21

## 2021-01-01 RX ADMIN — PANTOPRAZOLE SODIUM 40 MG: 40 INJECTION, POWDER, FOR SOLUTION INTRAVENOUS at 08:28

## 2021-01-01 RX ADMIN — PIPERACILLIN SODIUM AND TAZOBACTAM SODIUM 3375 MG: 3; .375 INJECTION, POWDER, LYOPHILIZED, FOR SOLUTION INTRAVENOUS at 04:59

## 2021-01-01 RX ADMIN — SODIUM CHLORIDE 1 MCG/KG/HR: 9 INJECTION, SOLUTION INTRAVENOUS at 04:24

## 2021-01-01 RX ADMIN — INSULIN LISPRO 6 UNITS: 100 INJECTION, SOLUTION INTRAVENOUS; SUBCUTANEOUS at 12:10

## 2021-01-01 RX ADMIN — PROPOFOL 45 MCG/KG/MIN: 10 INJECTION, EMULSION INTRAVENOUS at 02:09

## 2021-01-01 RX ADMIN — CHLORHEXIDINE GLUCONATE 15 ML: 1.2 RINSE ORAL at 08:48

## 2021-01-01 RX ADMIN — DEXAMETHASONE SODIUM PHOSPHATE 6 MG: 4 INJECTION, SOLUTION INTRAMUSCULAR; INTRAVENOUS at 08:10

## 2021-01-01 RX ADMIN — DOPAMINE HYDROCHLORIDE 2 MCG/KG/MIN: 160 INJECTION, SOLUTION INTRAVENOUS at 03:00

## 2021-01-01 RX ADMIN — FENTANYL CITRATE 200 MCG/HR: 50 INJECTION INTRAVENOUS at 18:33

## 2021-01-01 RX ADMIN — CHLORHEXIDINE GLUCONATE 15 ML: 1.2 RINSE ORAL at 21:28

## 2021-01-01 RX ADMIN — INSULIN GLARGINE 20 UNITS: 100 INJECTION, SOLUTION SUBCUTANEOUS at 08:46

## 2021-01-01 RX ADMIN — Medication 5 ML: at 21:36

## 2021-01-01 RX ADMIN — TRAZODONE HYDROCHLORIDE 100 MG: 50 TABLET ORAL at 20:03

## 2021-01-01 RX ADMIN — TRAZODONE HYDROCHLORIDE 100 MG: 50 TABLET ORAL at 21:37

## 2021-01-01 RX ADMIN — FENTANYL CITRATE 200 MCG/HR: 50 INJECTION INTRAVENOUS at 14:39

## 2021-01-01 RX ADMIN — PROPOFOL 30 MCG/KG/MIN: 10 INJECTION, EMULSION INTRAVENOUS at 23:21

## 2021-01-01 RX ADMIN — INSULIN LISPRO 6 UNITS: 100 INJECTION, SOLUTION INTRAVENOUS; SUBCUTANEOUS at 17:56

## 2021-01-01 RX ADMIN — INSULIN LISPRO 6 UNITS: 100 INJECTION, SOLUTION INTRAVENOUS; SUBCUTANEOUS at 12:08

## 2021-01-01 RX ADMIN — DEXAMETHASONE SODIUM PHOSPHATE 6 MG: 4 INJECTION, SOLUTION INTRAMUSCULAR; INTRAVENOUS at 21:34

## 2021-01-01 RX ADMIN — IPRATROPIUM BROMIDE AND ALBUTEROL SULFATE 1 AMPULE: .5; 3 SOLUTION RESPIRATORY (INHALATION) at 04:39

## 2021-01-01 RX ADMIN — ONDANSETRON 4 MG: 2 INJECTION INTRAMUSCULAR; INTRAVENOUS at 22:06

## 2021-01-01 RX ADMIN — FENTANYL CITRATE 200 MCG/HR: 50 INJECTION INTRAVENOUS at 14:00

## 2021-01-01 RX ADMIN — Medication 20 MEQ: at 13:00

## 2021-01-01 RX ADMIN — ENOXAPARIN SODIUM 105 MG: 120 INJECTION SUBCUTANEOUS at 19:55

## 2021-01-01 RX ADMIN — ONDANSETRON 4 MG: 2 INJECTION INTRAMUSCULAR; INTRAVENOUS at 11:52

## 2021-01-01 RX ADMIN — MIDAZOLAM HYDROCHLORIDE 2 MG: 1 INJECTION, SOLUTION INTRAMUSCULAR; INTRAVENOUS at 09:30

## 2021-01-01 RX ADMIN — FENTANYL CITRATE 175 MCG/HR: 50 INJECTION INTRAVENOUS at 09:57

## 2021-01-01 RX ADMIN — CLONAZEPAM 0.5 MG: 1 TABLET ORAL at 08:16

## 2021-01-01 RX ADMIN — PROPOFOL 35 MCG/KG/MIN: 10 INJECTION, EMULSION INTRAVENOUS at 09:28

## 2021-01-01 RX ADMIN — FENTANYL CITRATE 200 MCG/HR: 50 INJECTION INTRAVENOUS at 04:12

## 2021-01-01 RX ADMIN — PIPERACILLIN SODIUM AND TAZOBACTAM SODIUM 3375 MG: 3; .375 INJECTION, POWDER, LYOPHILIZED, FOR SOLUTION INTRAVENOUS at 20:24

## 2021-01-01 RX ADMIN — SODIUM CHLORIDE 1.4 MCG/KG/HR: 9 INJECTION, SOLUTION INTRAVENOUS at 12:48

## 2021-01-01 RX ADMIN — SODIUM CHLORIDE 1.4 MCG/KG/HR: 9 INJECTION, SOLUTION INTRAVENOUS at 01:39

## 2021-01-01 RX ADMIN — DOCUSATE SODIUM 100 MG: 50 LIQUID ORAL at 08:32

## 2021-01-01 RX ADMIN — GUAIFENESIN AND CODEINE PHOSPHATE 5 ML: 100; 10 SOLUTION ORAL at 20:10

## 2021-01-01 RX ADMIN — Medication 100 MG: at 09:30

## 2021-01-01 RX ADMIN — INSULIN GLARGINE 40 UNITS: 100 INJECTION, SOLUTION SUBCUTANEOUS at 21:34

## 2021-01-01 RX ADMIN — PIPERACILLIN SODIUM AND TAZOBACTAM SODIUM 3375 MG: 3; .375 INJECTION, POWDER, LYOPHILIZED, FOR SOLUTION INTRAVENOUS at 04:38

## 2021-01-01 RX ADMIN — ENOXAPARIN SODIUM 30 MG: 30 INJECTION SUBCUTANEOUS at 09:02

## 2021-01-01 RX ADMIN — SODIUM CHLORIDE, PRESERVATIVE FREE 10 ML: 5 INJECTION INTRAVENOUS at 08:33

## 2021-01-01 RX ADMIN — FENTANYL CITRATE 100 MCG/HR: 50 INJECTION INTRAVENOUS at 00:00

## 2021-01-01 RX ADMIN — PROPOFOL 50 MCG/KG/MIN: 10 INJECTION, EMULSION INTRAVENOUS at 20:49

## 2021-01-01 RX ADMIN — Medication 10 ML: at 20:26

## 2021-01-01 RX ADMIN — INSULIN GLARGINE 25 UNITS: 100 INJECTION, SOLUTION SUBCUTANEOUS at 20:14

## 2021-01-01 RX ADMIN — INSULIN GLARGINE 40 UNITS: 100 INJECTION, SOLUTION SUBCUTANEOUS at 08:45

## 2021-01-01 RX ADMIN — ACETAMINOPHEN 650 MG: 325 TABLET ORAL at 09:36

## 2021-01-01 RX ADMIN — DEXAMETHASONE SODIUM PHOSPHATE 6 MG: 4 INJECTION, SOLUTION INTRAMUSCULAR; INTRAVENOUS at 20:36

## 2021-01-01 RX ADMIN — CLONAZEPAM 0.5 MG: 1 TABLET ORAL at 22:56

## 2021-01-01 RX ADMIN — ENOXAPARIN SODIUM 105 MG: 120 INJECTION SUBCUTANEOUS at 21:19

## 2021-01-01 RX ADMIN — GUAIFENESIN AND CODEINE PHOSPHATE 10 ML: 100; 10 SOLUTION ORAL at 17:17

## 2021-01-01 RX ADMIN — SODIUM CHLORIDE, PRESERVATIVE FREE 10 ML: 5 INJECTION INTRAVENOUS at 12:34

## 2021-01-01 RX ADMIN — GUAIFENESIN AND CODEINE PHOSPHATE 10 ML: 100; 10 SOLUTION ORAL at 12:02

## 2021-01-01 RX ADMIN — SODIUM CHLORIDE 1.4 MCG/KG/HR: 9 INJECTION, SOLUTION INTRAVENOUS at 09:53

## 2021-01-01 RX ADMIN — PANTOPRAZOLE SODIUM 40 MG: 40 INJECTION, POWDER, FOR SOLUTION INTRAVENOUS at 10:40

## 2021-01-01 RX ADMIN — IPRATROPIUM BROMIDE AND ALBUTEROL SULFATE 1 AMPULE: .5; 3 SOLUTION RESPIRATORY (INHALATION) at 03:44

## 2021-01-01 RX ADMIN — POLYETHYLENE GLYCOL 3350 17 G: 17 POWDER, FOR SOLUTION ORAL at 08:31

## 2021-01-01 RX ADMIN — FENTANYL CITRATE 200 MCG/HR: 50 INJECTION INTRAVENOUS at 04:08

## 2021-01-01 RX ADMIN — PIPERACILLIN SODIUM AND TAZOBACTAM SODIUM 3375 MG: 3; .375 INJECTION, POWDER, LYOPHILIZED, FOR SOLUTION INTRAVENOUS at 03:45

## 2021-01-01 RX ADMIN — DOCUSATE SODIUM 100 MG: 50 LIQUID ORAL at 08:31

## 2021-01-01 RX ADMIN — IPRATROPIUM BROMIDE AND ALBUTEROL SULFATE 1 AMPULE: .5; 3 SOLUTION RESPIRATORY (INHALATION) at 11:57

## 2021-01-01 RX ADMIN — PROPOFOL 35 MCG/KG/MIN: 10 INJECTION, EMULSION INTRAVENOUS at 14:00

## 2021-01-01 RX ADMIN — SODIUM CHLORIDE, PRESERVATIVE FREE 10 ML: 5 INJECTION INTRAVENOUS at 09:31

## 2021-01-01 RX ADMIN — DEXAMETHASONE SODIUM PHOSPHATE 6 MG: 4 INJECTION, SOLUTION INTRAMUSCULAR; INTRAVENOUS at 09:02

## 2021-01-01 RX ADMIN — INSULIN GLARGINE 25 UNITS: 100 INJECTION, SOLUTION SUBCUTANEOUS at 09:29

## 2021-01-01 RX ADMIN — SODIUM CHLORIDE 1 MCG/KG/HR: 9 INJECTION, SOLUTION INTRAVENOUS at 19:50

## 2021-01-01 RX ADMIN — VANCOMYCIN HYDROCHLORIDE 1250 MG: 5 INJECTION, POWDER, LYOPHILIZED, FOR SOLUTION INTRAVENOUS at 20:21

## 2021-01-01 RX ADMIN — SODIUM PHOSPHATE, DIBASIC AND SODIUM PHOSPHATE, MONOBASIC 1 ENEMA: 7; 19 ENEMA RECTAL at 10:33

## 2021-01-01 RX ADMIN — CHLORHEXIDINE GLUCONATE 15 ML: 1.2 RINSE ORAL at 21:39

## 2021-01-01 RX ADMIN — FENTANYL CITRATE 200 MCG/HR: 50 INJECTION INTRAVENOUS at 00:25

## 2021-01-01 RX ADMIN — PROPOFOL 40 MCG/KG/MIN: 10 INJECTION, EMULSION INTRAVENOUS at 15:01

## 2021-01-01 RX ADMIN — REMDESIVIR 100 MG: 5 INJECTION INTRAVENOUS at 11:43

## 2021-01-01 RX ADMIN — SODIUM CHLORIDE 1 MCG/KG/HR: 9 INJECTION, SOLUTION INTRAVENOUS at 08:17

## 2021-01-01 RX ADMIN — CHLORHEXIDINE GLUCONATE 15 ML: 1.2 RINSE ORAL at 09:30

## 2021-01-01 RX ADMIN — ENOXAPARIN SODIUM 30 MG: 30 INJECTION SUBCUTANEOUS at 08:34

## 2021-01-01 RX ADMIN — INSULIN LISPRO 3 UNITS: 100 INJECTION, SOLUTION INTRAVENOUS; SUBCUTANEOUS at 09:05

## 2021-01-01 RX ADMIN — MUPIROCIN: 20 OINTMENT TOPICAL at 13:23

## 2021-01-01 RX ADMIN — PROPOFOL 50 MCG/KG/MIN: 10 INJECTION, EMULSION INTRAVENOUS at 12:18

## 2021-01-01 RX ADMIN — INSULIN GLARGINE 20 UNITS: 100 INJECTION, SOLUTION SUBCUTANEOUS at 21:30

## 2021-01-01 RX ADMIN — PANTOPRAZOLE SODIUM 40 MG: 40 INJECTION, POWDER, FOR SOLUTION INTRAVENOUS at 08:35

## 2021-01-01 RX ADMIN — ACETAMINOPHEN 650 MG: 650 SUPPOSITORY RECTAL at 17:44

## 2021-01-01 RX ADMIN — PROPOFOL 35 MCG/KG/MIN: 10 INJECTION, EMULSION INTRAVENOUS at 18:01

## 2021-01-01 RX ADMIN — TOCILIZUMAB 800 MG: 20 INJECTION, SOLUTION, CONCENTRATE INTRAVENOUS at 15:20

## 2021-01-01 RX ADMIN — PIPERACILLIN SODIUM AND TAZOBACTAM SODIUM 3375 MG: 3; .375 INJECTION, POWDER, LYOPHILIZED, FOR SOLUTION INTRAVENOUS at 19:53

## 2021-01-01 RX ADMIN — SODIUM CHLORIDE: 9 INJECTION, SOLUTION INTRAVENOUS at 09:30

## 2021-01-01 RX ADMIN — ACETAMINOPHEN 650 MG: 325 TABLET ORAL at 14:35

## 2021-01-01 RX ADMIN — LACTULOSE 20 G: 20 SOLUTION ORAL at 12:18

## 2021-01-01 RX ADMIN — CLONAZEPAM 0.5 MG: 1 TABLET ORAL at 21:22

## 2021-01-01 RX ADMIN — IPRATROPIUM BROMIDE AND ALBUTEROL SULFATE 1 AMPULE: .5; 3 SOLUTION RESPIRATORY (INHALATION) at 04:46

## 2021-01-01 RX ADMIN — INSULIN GLARGINE 25 UNITS: 100 INJECTION, SOLUTION SUBCUTANEOUS at 09:03

## 2021-01-01 RX ADMIN — CHLORHEXIDINE GLUCONATE 15 ML: 1.2 RINSE ORAL at 08:39

## 2021-01-01 RX ADMIN — ACETAMINOPHEN 650 MG: 650 SUPPOSITORY RECTAL at 08:55

## 2021-01-01 RX ADMIN — PROPOFOL 40 MCG/KG/MIN: 10 INJECTION, EMULSION INTRAVENOUS at 08:35

## 2021-01-01 RX ADMIN — Medication 10 ML: at 19:53

## 2021-01-01 RX ADMIN — PROPOFOL 20 MCG/KG/MIN: 10 INJECTION, EMULSION INTRAVENOUS at 08:47

## 2021-01-01 RX ADMIN — CHLORHEXIDINE GLUCONATE 15 ML: 1.2 RINSE ORAL at 07:37

## 2021-01-01 RX ADMIN — CHLORHEXIDINE GLUCONATE 15 ML: 1.2 RINSE ORAL at 23:23

## 2021-01-01 RX ADMIN — Medication 10 ML: at 08:18

## 2021-01-01 RX ADMIN — FUROSEMIDE 20 MG: 10 INJECTION INTRAMUSCULAR; INTRAVENOUS at 13:46

## 2021-01-01 RX ADMIN — DEXTROSE MONOHYDRATE 12.5 G: 25 INJECTION, SOLUTION INTRAVENOUS at 20:03

## 2021-01-01 RX ADMIN — METOCLOPRAMIDE HYDROCHLORIDE 10 MG: 5 INJECTION INTRAMUSCULAR; INTRAVENOUS at 16:44

## 2021-01-01 RX ADMIN — POTASSIUM CHLORIDE 10 MEQ: 7.46 INJECTION, SOLUTION INTRAVENOUS at 10:23

## 2021-01-01 RX ADMIN — PANTOPRAZOLE SODIUM 40 MG: 40 INJECTION, POWDER, FOR SOLUTION INTRAVENOUS at 08:23

## 2021-01-01 RX ADMIN — ACETAMINOPHEN 650 MG: 650 SUPPOSITORY RECTAL at 22:24

## 2021-01-01 RX ADMIN — PROPOFOL 50 MCG/KG/MIN: 10 INJECTION, EMULSION INTRAVENOUS at 17:59

## 2021-01-01 RX ADMIN — FENTANYL CITRATE 100 MCG/HR: 50 INJECTION INTRAVENOUS at 08:19

## 2021-01-01 RX ADMIN — PANTOPRAZOLE SODIUM 40 MG: 40 TABLET, DELAYED RELEASE ORAL at 08:58

## 2021-01-01 RX ADMIN — FENTANYL CITRATE 100 MCG/HR: 50 INJECTION INTRAVENOUS at 19:45

## 2021-01-01 RX ADMIN — ENOXAPARIN SODIUM 105 MG: 120 INJECTION SUBCUTANEOUS at 23:04

## 2021-01-01 RX ADMIN — DOCUSATE SODIUM 100 MG: 50 LIQUID ORAL at 19:53

## 2021-01-01 RX ADMIN — CHLORHEXIDINE GLUCONATE 15 ML: 1.2 RINSE ORAL at 08:40

## 2021-01-01 RX ADMIN — SODIUM CHLORIDE 0.6 MCG/KG/HR: 9 INJECTION, SOLUTION INTRAVENOUS at 08:33

## 2021-01-01 RX ADMIN — ACETAMINOPHEN 650 MG: 650 SUPPOSITORY RECTAL at 10:07

## 2021-01-01 RX ADMIN — PROPOFOL 40 MCG/KG/MIN: 10 INJECTION, EMULSION INTRAVENOUS at 01:28

## 2021-01-01 RX ADMIN — PROPOFOL 40 MCG/KG/MIN: 10 INJECTION, EMULSION INTRAVENOUS at 04:36

## 2021-01-01 RX ADMIN — KETOROLAC TROMETHAMINE 30 MG: 30 INJECTION, SOLUTION INTRAMUSCULAR; INTRAVENOUS at 09:59

## 2021-01-01 RX ADMIN — ENOXAPARIN SODIUM 105 MG: 120 INJECTION SUBCUTANEOUS at 08:42

## 2021-01-01 RX ADMIN — IPRATROPIUM BROMIDE AND ALBUTEROL SULFATE 1 AMPULE: .5; 3 SOLUTION RESPIRATORY (INHALATION) at 22:02

## 2021-01-01 RX ADMIN — PROPOFOL 45 MCG/KG/MIN: 10 INJECTION, EMULSION INTRAVENOUS at 06:52

## 2021-01-01 RX ADMIN — SODIUM CHLORIDE 0.6 MCG/KG/HR: 9 INJECTION, SOLUTION INTRAVENOUS at 11:13

## 2021-01-01 ASSESSMENT — PULMONARY FUNCTION TESTS
PIF_VALUE: 37
PIF_VALUE: 42
PIF_VALUE: 37
PIF_VALUE: 37
PIF_VALUE: 41
PIF_VALUE: 49
PIF_VALUE: 57
PIF_VALUE: 62
PIF_VALUE: 23
PIF_VALUE: 29
PIF_VALUE: 36
PIF_VALUE: 36
PIF_VALUE: 42
PIF_VALUE: 19
PIF_VALUE: 23
PIF_VALUE: 44
PIF_VALUE: 34
PIF_VALUE: 38
PIF_VALUE: 44
PIF_VALUE: 37
PIF_VALUE: 43
PIF_VALUE: 41
PIF_VALUE: 33
PIF_VALUE: 56
PIF_VALUE: 36
PIF_VALUE: 62
PIF_VALUE: 42
PIF_VALUE: 42
PIF_VALUE: 39
PIF_VALUE: 45
PIF_VALUE: 12
PIF_VALUE: 45
PIF_VALUE: 66
PIF_VALUE: 35
PIF_VALUE: 45
PIF_VALUE: 38
PIF_VALUE: 62
PIF_VALUE: 45
PIF_VALUE: 54
PIF_VALUE: 31
PIF_VALUE: 32
PIF_VALUE: 29
PIF_VALUE: 44
PIF_VALUE: 30
PIF_VALUE: 63
PIF_VALUE: 39
PIF_VALUE: 44
PIF_VALUE: 34
PIF_VALUE: 46
PIF_VALUE: 29
PIF_VALUE: 42
PIF_VALUE: 60
PIF_VALUE: 43
PIF_VALUE: 37
PIF_VALUE: 26
PIF_VALUE: 28
PIF_VALUE: 52
PIF_VALUE: 44
PIF_VALUE: 51
PIF_VALUE: 39
PIF_VALUE: 39
PIF_VALUE: 32
PIF_VALUE: 63
PIF_VALUE: 62
PIF_VALUE: 39
PIF_VALUE: 63
PIF_VALUE: 44
PIF_VALUE: 14
PIF_VALUE: 29
PIF_VALUE: 34
PIF_VALUE: 40
PIF_VALUE: 33
PIF_VALUE: 49
PIF_VALUE: 32
PIF_VALUE: 71
PIF_VALUE: 37
PIF_VALUE: 43
PIF_VALUE: 60
PIF_VALUE: 55
PIF_VALUE: 40
PIF_VALUE: 41
PIF_VALUE: 47
PIF_VALUE: 26
PIF_VALUE: 31
PIF_VALUE: 36
PIF_VALUE: 34
PIF_VALUE: 48
PIF_VALUE: 18
PIF_VALUE: 37
PIF_VALUE: 34
PIF_VALUE: 42
PIF_VALUE: 36
PIF_VALUE: 38
PIF_VALUE: 59
PIF_VALUE: 37
PIF_VALUE: 67
PIF_VALUE: 42
PIF_VALUE: 41
PIF_VALUE: 32
PIF_VALUE: 47
PIF_VALUE: 44
PIF_VALUE: 52
PIF_VALUE: 17
PIF_VALUE: 35
PIF_VALUE: 28
PIF_VALUE: 29
PIF_VALUE: 21
PIF_VALUE: 8
PIF_VALUE: 8.3
PIF_VALUE: 38
PIF_VALUE: 37
PIF_VALUE: 35
PIF_VALUE: 44
PIF_VALUE: 35
PIF_VALUE: 43
PIF_VALUE: 59
PIF_VALUE: 40
PIF_VALUE: 42
PIF_VALUE: 45
PIF_VALUE: 49
PIF_VALUE: 41
PIF_VALUE: 62
PIF_VALUE: 85
PIF_VALUE: 45
PIF_VALUE: 36
PIF_VALUE: 32
PIF_VALUE: 45
PIF_VALUE: 67
PIF_VALUE: 48
PIF_VALUE: 47
PIF_VALUE: 29
PIF_VALUE: 60
PIF_VALUE: 45
PIF_VALUE: 24
PIF_VALUE: 63
PIF_VALUE: 44
PIF_VALUE: 43
PIF_VALUE: 34
PIF_VALUE: 42
PIF_VALUE: 59
PIF_VALUE: 51
PIF_VALUE: 13
PIF_VALUE: 67
PIF_VALUE: 47
PIF_VALUE: 41
PIF_VALUE: 36
PIF_VALUE: 35
PIF_VALUE: 39
PIF_VALUE: 39
PIF_VALUE: 42
PIF_VALUE: 46
PIF_VALUE: 36
PIF_VALUE: 43
PIF_VALUE: 53
PIF_VALUE: 38
PIF_VALUE: 60
PIF_VALUE: 39
PIF_VALUE: 44
PIF_VALUE: 17
PIF_VALUE: 28
PIF_VALUE: 43
PIF_VALUE: 40
PIF_VALUE: 38
PIF_VALUE: 42
PIF_VALUE: 56
PIF_VALUE: 42
PIF_VALUE: 21
PIF_VALUE: 49
PIF_VALUE: 37
PIF_VALUE: 36
PIF_VALUE: 32
PIF_VALUE: 39
PIF_VALUE: 46
PIF_VALUE: 32
PIF_VALUE: 40
PIF_VALUE: 30
PIF_VALUE: 53
PIF_VALUE: 38
PIF_VALUE: 45
PIF_VALUE: 59
PIF_VALUE: 32
PIF_VALUE: 41
PIF_VALUE: 57
PIF_VALUE: 41
PIF_VALUE: 32
PIF_VALUE: 14
PIF_VALUE: 54
PIF_VALUE: 41
PIF_VALUE: 44
PIF_VALUE: 17
PIF_VALUE: 47
PIF_VALUE: 39
PIF_VALUE: 41
PIF_VALUE: 44
PIF_VALUE: 46
PIF_VALUE: 18
PIF_VALUE: 55
PIF_VALUE: 41
PIF_VALUE: 49
PIF_VALUE: 45
PIF_VALUE: 39
PIF_VALUE: 49
PIF_VALUE: 40
PIF_VALUE: 39
PIF_VALUE: 63
PIF_VALUE: 15
PIF_VALUE: 41
PIF_VALUE: 5.7
PIF_VALUE: 30
PIF_VALUE: 19
PIF_VALUE: 32
PIF_VALUE: 38
PIF_VALUE: 38
PIF_VALUE: 41
PIF_VALUE: 61
PIF_VALUE: 44
PIF_VALUE: 45
PIF_VALUE: 52
PIF_VALUE: 38
PIF_VALUE: 42
PIF_VALUE: 42
PIF_VALUE: 47
PIF_VALUE: 32
PIF_VALUE: 32
PIF_VALUE: 33
PIF_VALUE: 37
PIF_VALUE: 42
PIF_VALUE: 43
PIF_VALUE: 37
PIF_VALUE: 41
PIF_VALUE: 30
PIF_VALUE: 31
PIF_VALUE: 45
PIF_VALUE: 56
PIF_VALUE: 30
PIF_VALUE: 49
PIF_VALUE: 46
PIF_VALUE: 41
PIF_VALUE: 59
PIF_VALUE: 36
PIF_VALUE: 49
PIF_VALUE: 63
PIF_VALUE: 40
PIF_VALUE: 30
PIF_VALUE: 29
PIF_VALUE: 59
PIF_VALUE: 37
PIF_VALUE: 53
PIF_VALUE: 41
PIF_VALUE: 46
PIF_VALUE: 45
PIF_VALUE: 31
PIF_VALUE: 48
PIF_VALUE: 40
PIF_VALUE: 38
PIF_VALUE: 39
PIF_VALUE: 46
PIF_VALUE: 55
PIF_VALUE: 58
PIF_VALUE: 58
PIF_VALUE: 38
PIF_VALUE: 51
PIF_VALUE: 22
PIF_VALUE: 40
PIF_VALUE: 36
PIF_VALUE: 37
PIF_VALUE: 51
PIF_VALUE: 29
PIF_VALUE: 31
PIF_VALUE: 43
PIF_VALUE: 31
PIF_VALUE: 37
PIF_VALUE: 54
PIF_VALUE: 32
PIF_VALUE: 47
PIF_VALUE: 46
PIF_VALUE: 59
PIF_VALUE: 25
PIF_VALUE: 23
PIF_VALUE: 30
PIF_VALUE: 34
PIF_VALUE: 36
PIF_VALUE: 50
PIF_VALUE: 59
PIF_VALUE: 53
PIF_VALUE: 31
PIF_VALUE: 25
PIF_VALUE: 54
PIF_VALUE: 24
PIF_VALUE: 41
PIF_VALUE: 33
PIF_VALUE: 42
PIF_VALUE: 50
PIF_VALUE: 46
PIF_VALUE: 37
PIF_VALUE: 36
PIF_VALUE: 48
PIF_VALUE: 40
PIF_VALUE: 36
PIF_VALUE: 48
PIF_VALUE: 42
PIF_VALUE: 45
PIF_VALUE: 45
PIF_VALUE: 44
PIF_VALUE: 56
PIF_VALUE: 26
PIF_VALUE: 33
PIF_VALUE: 62
PIF_VALUE: 45
PIF_VALUE: 43
PIF_VALUE: 42
PIF_VALUE: 37
PIF_VALUE: 33
PIF_VALUE: 40
PIF_VALUE: 43
PIF_VALUE: 42
PIF_VALUE: 27
PIF_VALUE: 38
PIF_VALUE: 33
PIF_VALUE: 42
PIF_VALUE: 58
PIF_VALUE: 45
PIF_VALUE: 13
PIF_VALUE: 29
PIF_VALUE: 51
PIF_VALUE: 26
PIF_VALUE: 36
PIF_VALUE: 33
PIF_VALUE: 52
PIF_VALUE: 58
PIF_VALUE: 55
PIF_VALUE: 62
PIF_VALUE: 43
PIF_VALUE: 37
PIF_VALUE: 27
PIF_VALUE: 38
PIF_VALUE: 31
PIF_VALUE: 34
PIF_VALUE: 41
PIF_VALUE: 40
PIF_VALUE: 27
PIF_VALUE: 32
PIF_VALUE: 52
PIF_VALUE: 37
PIF_VALUE: 56
PIF_VALUE: 47
PIF_VALUE: 37
PIF_VALUE: 39
PIF_VALUE: 62
PIF_VALUE: 37
PIF_VALUE: 37
PIF_VALUE: 35
PIF_VALUE: 38
PIF_VALUE: 38
PIF_VALUE: 49
PIF_VALUE: 17
PIF_VALUE: 35
PIF_VALUE: 46
PIF_VALUE: 45
PIF_VALUE: 30
PIF_VALUE: 46
PIF_VALUE: 29
PIF_VALUE: 35
PIF_VALUE: 37
PIF_VALUE: 45
PIF_VALUE: 40
PIF_VALUE: 33
PIF_VALUE: 23
PIF_VALUE: 40
PIF_VALUE: 29
PIF_VALUE: 58
PIF_VALUE: 48
PIF_VALUE: 53
PIF_VALUE: 28
PIF_VALUE: 43
PIF_VALUE: 47
PIF_VALUE: 30
PIF_VALUE: 37
PIF_VALUE: 39
PIF_VALUE: 44
PIF_VALUE: 28
PIF_VALUE: 47
PIF_VALUE: 40
PIF_VALUE: 35
PIF_VALUE: 42
PIF_VALUE: 52
PIF_VALUE: 57
PIF_VALUE: 34
PIF_VALUE: 34
PIF_VALUE: 31
PIF_VALUE: 48
PIF_VALUE: 40
PIF_VALUE: 36
PIF_VALUE: 51
PIF_VALUE: 49
PIF_VALUE: 42
PIF_VALUE: 22
PIF_VALUE: 40
PIF_VALUE: 39
PIF_VALUE: 31
PIF_VALUE: 41
PIF_VALUE: 40
PIF_VALUE: 32
PIF_VALUE: 54
PIF_VALUE: 30
PIF_VALUE: 32
PIF_VALUE: 52
PIF_VALUE: 50
PIF_VALUE: 39
PIF_VALUE: 50
PIF_VALUE: 52
PIF_VALUE: 47
PIF_VALUE: 14
PIF_VALUE: 43
PIF_VALUE: 36
PIF_VALUE: 53
PIF_VALUE: 9.1
PIF_VALUE: 60
PIF_VALUE: 31
PIF_VALUE: 54
PIF_VALUE: 48
PIF_VALUE: 13
PIF_VALUE: 41
PIF_VALUE: 30
PIF_VALUE: 47
PIF_VALUE: 44
PIF_VALUE: 44
PIF_VALUE: 51
PIF_VALUE: 50
PIF_VALUE: 30
PIF_VALUE: 33
PIF_VALUE: 54
PIF_VALUE: 46
PIF_VALUE: 13
PIF_VALUE: 35
PIF_VALUE: 49
PIF_VALUE: 29
PIF_VALUE: 64
PIF_VALUE: 21
PIF_VALUE: 43
PIF_VALUE: 31
PIF_VALUE: 37
PIF_VALUE: 43
PIF_VALUE: 45
PIF_VALUE: 46
PIF_VALUE: 30
PIF_VALUE: 58
PIF_VALUE: 42
PIF_VALUE: 29
PIF_VALUE: 39
PIF_VALUE: 32
PIF_VALUE: 45
PIF_VALUE: 32
PIF_VALUE: 67
PIF_VALUE: 42
PIF_VALUE: 62
PIF_VALUE: 46
PIF_VALUE: 55
PIF_VALUE: 22
PIF_VALUE: 62
PIF_VALUE: 32
PIF_VALUE: 38
PIF_VALUE: 38
PIF_VALUE: 45
PIF_VALUE: 23
PIF_VALUE: 17
PIF_VALUE: 49
PIF_VALUE: 29
PIF_VALUE: 39
PIF_VALUE: 45
PIF_VALUE: 36
PIF_VALUE: 47
PIF_VALUE: 34
PIF_VALUE: 55
PIF_VALUE: 44
PIF_VALUE: 37
PIF_VALUE: 37
PIF_VALUE: 30
PIF_VALUE: 41
PIF_VALUE: 62
PIF_VALUE: 52
PIF_VALUE: 27
PIF_VALUE: 39
PIF_VALUE: 50
PIF_VALUE: 49
PIF_VALUE: 29
PIF_VALUE: 40
PIF_VALUE: 37
PIF_VALUE: 50
PIF_VALUE: 47
PIF_VALUE: 31
PIF_VALUE: 31
PIF_VALUE: 39
PIF_VALUE: 44
PIF_VALUE: 45
PIF_VALUE: 42
PIF_VALUE: 47
PIF_VALUE: 24
PIF_VALUE: 59
PIF_VALUE: 36
PIF_VALUE: 46
PIF_VALUE: 49
PIF_VALUE: 44
PIF_VALUE: 35
PIF_VALUE: 37
PIF_VALUE: 47
PIF_VALUE: 40
PIF_VALUE: 43
PIF_VALUE: 42
PIF_VALUE: 39
PIF_VALUE: 40
PIF_VALUE: 31
PIF_VALUE: 37
PIF_VALUE: 39
PIF_VALUE: 13
PIF_VALUE: 46
PIF_VALUE: 31
PIF_VALUE: 56
PIF_VALUE: 37
PIF_VALUE: 41
PIF_VALUE: 40
PIF_VALUE: 31
PIF_VALUE: 50
PIF_VALUE: 35
PIF_VALUE: 40
PIF_VALUE: 41
PIF_VALUE: 55
PIF_VALUE: 45
PIF_VALUE: 39
PIF_VALUE: 45
PIF_VALUE: 41
PIF_VALUE: 36
PIF_VALUE: 48
PIF_VALUE: 37
PIF_VALUE: 39
PIF_VALUE: 30
PIF_VALUE: 33
PIF_VALUE: 42
PIF_VALUE: 45
PIF_VALUE: 50
PIF_VALUE: 23
PIF_VALUE: 40
PIF_VALUE: 13
PIF_VALUE: 29
PIF_VALUE: 50
PIF_VALUE: 33
PIF_VALUE: 62
PIF_VALUE: 42
PIF_VALUE: 39
PIF_VALUE: 30
PIF_VALUE: 38
PIF_VALUE: 41
PIF_VALUE: 50
PIF_VALUE: 62
PIF_VALUE: 38
PIF_VALUE: 16
PIF_VALUE: 47
PIF_VALUE: 36
PIF_VALUE: 13
PIF_VALUE: 43
PIF_VALUE: 43
PIF_VALUE: 25
PIF_VALUE: 37
PIF_VALUE: 39
PIF_VALUE: 37
PIF_VALUE: 40
PIF_VALUE: 37
PIF_VALUE: 49
PIF_VALUE: 45
PIF_VALUE: 43
PIF_VALUE: 42
PIF_VALUE: 30
PIF_VALUE: 48
PIF_VALUE: 41
PIF_VALUE: 57
PIF_VALUE: 40
PIF_VALUE: 47
PIF_VALUE: 47
PIF_VALUE: 40
PIF_VALUE: 27
PIF_VALUE: 48
PIF_VALUE: 43
PIF_VALUE: 53
PIF_VALUE: 35
PIF_VALUE: 20
PIF_VALUE: 45
PIF_VALUE: 41
PIF_VALUE: 38
PIF_VALUE: 22
PIF_VALUE: 65
PIF_VALUE: 54
PIF_VALUE: 41
PIF_VALUE: 42
PIF_VALUE: 62
PIF_VALUE: 36
PIF_VALUE: 46
PIF_VALUE: 56
PIF_VALUE: 46
PIF_VALUE: 35
PIF_VALUE: 37
PIF_VALUE: 46
PIF_VALUE: 38
PIF_VALUE: 43
PIF_VALUE: 46
PIF_VALUE: 49
PIF_VALUE: 36
PIF_VALUE: 33
PIF_VALUE: 46
PIF_VALUE: 31
PIF_VALUE: 49
PIF_VALUE: 24
PIF_VALUE: 50
PIF_VALUE: 37
PIF_VALUE: 35
PIF_VALUE: 53
PIF_VALUE: 39
PIF_VALUE: 31
PIF_VALUE: 28
PIF_VALUE: 48
PIF_VALUE: 31
PIF_VALUE: 31
PIF_VALUE: 15
PIF_VALUE: 53
PIF_VALUE: 40
PIF_VALUE: 45
PIF_VALUE: 40
PIF_VALUE: 15
PIF_VALUE: 19
PIF_VALUE: 32
PIF_VALUE: 16
PIF_VALUE: 49
PIF_VALUE: 28
PIF_VALUE: 35
PIF_VALUE: 40
PIF_VALUE: 57
PIF_VALUE: 50
PIF_VALUE: 50
PIF_VALUE: 46
PIF_VALUE: 53
PIF_VALUE: 37
PIF_VALUE: 44
PIF_VALUE: 33
PIF_VALUE: 60
PIF_VALUE: 57
PIF_VALUE: 38
PIF_VALUE: 36
PIF_VALUE: 14
PIF_VALUE: 37
PIF_VALUE: 61
PIF_VALUE: 33
PIF_VALUE: 60
PIF_VALUE: 60
PIF_VALUE: 32
PIF_VALUE: 40
PIF_VALUE: 33
PIF_VALUE: 15
PIF_VALUE: 54
PIF_VALUE: 47
PIF_VALUE: 21
PIF_VALUE: 14
PIF_VALUE: 47
PIF_VALUE: 41
PIF_VALUE: 61
PIF_VALUE: 49
PIF_VALUE: 37
PIF_VALUE: 49
PIF_VALUE: 40
PIF_VALUE: 51
PIF_VALUE: 44
PIF_VALUE: 47
PIF_VALUE: 49
PIF_VALUE: 31
PIF_VALUE: 48
PIF_VALUE: 35
PIF_VALUE: 59
PIF_VALUE: 35
PIF_VALUE: 36
PIF_VALUE: 42
PIF_VALUE: 36
PIF_VALUE: 61
PIF_VALUE: 42
PIF_VALUE: 41
PIF_VALUE: 42
PIF_VALUE: 41
PIF_VALUE: 56
PIF_VALUE: 26
PIF_VALUE: 67
PIF_VALUE: 61
PIF_VALUE: 58
PIF_VALUE: 42
PIF_VALUE: 33
PIF_VALUE: 42
PIF_VALUE: 27
PIF_VALUE: 39
PIF_VALUE: 43
PIF_VALUE: 39
PIF_VALUE: 38
PIF_VALUE: 48
PIF_VALUE: 42
PIF_VALUE: 53
PIF_VALUE: 52
PIF_VALUE: 30
PIF_VALUE: 36
PIF_VALUE: 33
PIF_VALUE: 56
PIF_VALUE: 38
PIF_VALUE: 39
PIF_VALUE: 41
PIF_VALUE: 34
PIF_VALUE: 37
PIF_VALUE: 42
PIF_VALUE: 48
PIF_VALUE: 35
PIF_VALUE: 41
PIF_VALUE: 47
PIF_VALUE: 33
PIF_VALUE: 32
PIF_VALUE: 56
PIF_VALUE: 30
PIF_VALUE: 42
PIF_VALUE: 38
PIF_VALUE: 33
PIF_VALUE: 36
PIF_VALUE: 50
PIF_VALUE: 30
PIF_VALUE: 48
PIF_VALUE: 42
PIF_VALUE: 45
PIF_VALUE: 26
PIF_VALUE: 32
PIF_VALUE: 39
PIF_VALUE: 15
PIF_VALUE: 40
PIF_VALUE: 60
PIF_VALUE: 30
PIF_VALUE: 30
PIF_VALUE: 53
PIF_VALUE: 38
PIF_VALUE: 50
PIF_VALUE: 37
PIF_VALUE: 38
PIF_VALUE: 38
PIF_VALUE: 40
PIF_VALUE: 58
PIF_VALUE: 59
PIF_VALUE: 23
PIF_VALUE: 30
PIF_VALUE: 41
PIF_VALUE: 47
PIF_VALUE: 37
PIF_VALUE: 39
PIF_VALUE: 54
PIF_VALUE: 36
PIF_VALUE: 41
PIF_VALUE: 17
PIF_VALUE: 59
PIF_VALUE: 35
PIF_VALUE: 43
PIF_VALUE: 42
PIF_VALUE: 44
PIF_VALUE: 48
PIF_VALUE: 34
PIF_VALUE: 12

## 2021-01-01 ASSESSMENT — PAIN SCALES - WONG BAKER
WONGBAKER_NUMERICALRESPONSE: 0
WONGBAKER_NUMERICALRESPONSE: 2
WONGBAKER_NUMERICALRESPONSE: 0
WONGBAKER_NUMERICALRESPONSE: 2
WONGBAKER_NUMERICALRESPONSE: 0

## 2021-01-01 ASSESSMENT — PAIN SCALES - GENERAL
PAINLEVEL_OUTOF10: 0
PAINLEVEL_OUTOF10: 5
PAINLEVEL_OUTOF10: 0
PAINLEVEL_OUTOF10: 2
PAINLEVEL_OUTOF10: 0
PAINLEVEL_OUTOF10: 3
PAINLEVEL_OUTOF10: 0
PAINLEVEL_OUTOF10: 5
PAINLEVEL_OUTOF10: 0
PAINLEVEL_OUTOF10: 3
PAINLEVEL_OUTOF10: 0
PAINLEVEL_OUTOF10: 8
PAINLEVEL_OUTOF10: 0
PAINLEVEL_OUTOF10: 5
PAINLEVEL_OUTOF10: 0
PAINLEVEL_OUTOF10: 3
PAINLEVEL_OUTOF10: 0
PAINLEVEL_OUTOF10: 0
PAINLEVEL_OUTOF10: 4
PAINLEVEL_OUTOF10: 0
PAINLEVEL_OUTOF10: 4
PAINLEVEL_OUTOF10: 0
PAINLEVEL_OUTOF10: 3
PAINLEVEL_OUTOF10: 0
PAINLEVEL_OUTOF10: 4
PAINLEVEL_OUTOF10: 0
PAINLEVEL_OUTOF10: 10
PAINLEVEL_OUTOF10: 0
PAINLEVEL_OUTOF10: 4
PAINLEVEL_OUTOF10: 0
PAINLEVEL_OUTOF10: 4
PAINLEVEL_OUTOF10: 0
PAINLEVEL_OUTOF10: 3
PAINLEVEL_OUTOF10: 0
PAINLEVEL_OUTOF10: 2
PAINLEVEL_OUTOF10: 0
PAINLEVEL_OUTOF10: 0
PAINLEVEL_OUTOF10: 8
PAINLEVEL_OUTOF10: 0
PAINLEVEL_OUTOF10: 4
PAINLEVEL_OUTOF10: 0
PAINLEVEL_OUTOF10: 7
PAINLEVEL_OUTOF10: 0
PAINLEVEL_OUTOF10: 5
PAINLEVEL_OUTOF10: 0
PAINLEVEL_OUTOF10: 4
PAINLEVEL_OUTOF10: 0
PAINLEVEL_OUTOF10: 6
PAINLEVEL_OUTOF10: 0
PAINLEVEL_OUTOF10: 5
PAINLEVEL_OUTOF10: 0
PAINLEVEL_OUTOF10: 9
PAINLEVEL_OUTOF10: 0

## 2021-01-01 ASSESSMENT — PAIN DESCRIPTION - PROGRESSION

## 2021-01-01 ASSESSMENT — PAIN DESCRIPTION - DESCRIPTORS
DESCRIPTORS: NAGGING
DESCRIPTORS: HEADACHE
DESCRIPTORS: NAGGING

## 2021-01-01 ASSESSMENT — PAIN DESCRIPTION - FREQUENCY
FREQUENCY: INTERMITTENT

## 2021-01-01 ASSESSMENT — PAIN DESCRIPTION - PAIN TYPE
TYPE: ACUTE PAIN
TYPE: CHRONIC PAIN
TYPE: ACUTE PAIN
TYPE: ACUTE PAIN
TYPE: CHRONIC PAIN

## 2021-01-01 ASSESSMENT — ENCOUNTER SYMPTOMS
DIARRHEA: 0
COUGH: 1
ABDOMINAL DISTENTION: 0
NAUSEA: 0
GASTROINTESTINAL NEGATIVE: 1
NAUSEA: 0
ABDOMINAL PAIN: 0
NAUSEA: 0
ABDOMINAL DISTENTION: 0
COUGH: 1
NAUSEA: 0
NAUSEA: 0
GASTROINTESTINAL NEGATIVE: 1
DIARRHEA: 0
SHORTNESS OF BREATH: 1
VOMITING: 0
SHORTNESS OF BREATH: 1
ABDOMINAL PAIN: 0
COUGH: 1
SHORTNESS OF BREATH: 1
COUGH: 1
COUGH: 1
VOMITING: 0
COUGH: 1
SHORTNESS OF BREATH: 1
ABDOMINAL PAIN: 0
COUGH: 1
SHORTNESS OF BREATH: 1
SHORTNESS OF BREATH: 1
VOMITING: 0
DIARRHEA: 0
ABDOMINAL PAIN: 0
ABDOMINAL DISTENTION: 0
VOMITING: 0
SHORTNESS OF BREATH: 1
ABDOMINAL DISTENTION: 0
SHORTNESS OF BREATH: 1
SHORTNESS OF BREATH: 1
DIARRHEA: 0
COUGH: 1
SHORTNESS OF BREATH: 1
SHORTNESS OF BREATH: 1
COUGH: 1
ABDOMINAL PAIN: 0
NAUSEA: 1
ABDOMINAL PAIN: 0
ABDOMINAL DISTENTION: 0
DIARRHEA: 0
GASTROINTESTINAL NEGATIVE: 1
EYES NEGATIVE: 1
DIARRHEA: 0
ABDOMINAL DISTENTION: 0
GASTROINTESTINAL NEGATIVE: 1

## 2021-01-01 ASSESSMENT — PAIN DESCRIPTION - ONSET
ONSET: ON-GOING
ONSET: ON-GOING
ONSET: GRADUAL

## 2021-01-01 ASSESSMENT — PAIN DESCRIPTION - LOCATION
LOCATION: HEAD
LOCATION: BACK
LOCATION: CHEST;RIB CAGE
LOCATION: CHEST;RIB CAGE
LOCATION: BACK
LOCATION: BACK;HIP
LOCATION: BACK

## 2021-01-01 ASSESSMENT — PAIN - FUNCTIONAL ASSESSMENT
PAIN_FUNCTIONAL_ASSESSMENT: PREVENTS OR INTERFERES SOME ACTIVE ACTIVITIES AND ADLS
PAIN_FUNCTIONAL_ASSESSMENT: ACTIVITIES ARE NOT PREVENTED
PAIN_FUNCTIONAL_ASSESSMENT: ACTIVITIES ARE NOT PREVENTED

## 2021-01-01 ASSESSMENT — PAIN DESCRIPTION - ORIENTATION
ORIENTATION: RIGHT;LOWER
ORIENTATION: POSTERIOR;LOWER
ORIENTATION: RIGHT;LOWER
ORIENTATION: LOWER;LEFT

## 2021-08-10 PROBLEM — U07.1 COVID-19: Status: ACTIVE | Noted: 2021-01-01

## 2021-08-10 NOTE — PROGRESS NOTES
[id number]: pt arrives from ED, alert and oriented x4, vitals stable with exception of O2 sat which is 90% on nrb mask and placed on bipap 14/7 at 80% sat 90-93%. Pt slightly anxious, but able to be calmed down easily. IV's checked and functioning, lung sounds diminished t/o, bowels active bm 2 days ago normal formed and in color per pt. No edema noted. Pt currently looking at pictures on phone. Orders reviewed with dr. Cal Cisneros and new orders obtained. Waiting on actemra and remdesivir. Covered with high og for bs of 340, new ssc started. Ultrasound to come up for [address] and BLE.     1340: Dr. Carlton Dominguez aware pt sats when sleeping are 87% RT also aware, ok for pt to sat 87% when sleeping per dr. Cal Cisneros.

## 2021-08-10 NOTE — ED PROVIDER NOTES
[facility] ED  eMERGENCY dEPARTMENT eNCOUnter      Pt Name: Adebayo Mccullough  MRN: [id number]  [place] 1965  Date of evaluation: 8/10/2021  Provider: APPLE Odom        HISTORY OF PRESENT ILLNESS    Adebayo Mccullough is a 61 y.o. male per chart review has no pertinent pmhx presents to the ED with c/o sudden onset, gradually worsening SOB x4 days that suddenly worsened this AM. Denies chest pain, nausea/vomiting, or abdominal pain. No history of known sick contacts/exposure, no tobacco use or COPD history. No oxygen use at home. Per EMS report, bought pulse ox several days ago due to shortness of breath and called 911 due to a pulse ox level of 68 on RA. Low 70s on NC, improved to high 80s on NRB. REVIEW OF SYSTEMS       Review of Systems   Unable to perform ROS: Acuity of condition     Except as noted above the remainder of the review of systems was reviewed and negative. PAST MEDICAL HISTORY   History reviewed. No pertinent past medical history. SURGICAL HISTORY       Past Surgical History:   Procedure Laterality Date    SCAPULA SURGERY Left          CURRENT MEDICATIONS       Previous Medications    No medications on file       ALLERGIES     Patient has no known allergies. FAMILY HISTORY     History reviewed. No pertinent family history.        SOCIAL HISTORY       Social History     Socioeconomic History    Marital status: Unknown     Spouse name: None    Number of children: None    Years of education: None    Highest education level: None   Occupational History    None   Tobacco Use    Smoking status: Never Smoker    Smokeless tobacco: Never Used   Vaping Use    Vaping Use: Never used   Substance and Sexual Activity    Alcohol use: Never    Drug use: Never    Sexual activity: Never   Other Topics Concern    None   Social History Narrative    None     Social Determinants of Health     Financial Resource Strain:     Difficulty of Paying Living Expenses:    Food Insecurity:     Worried About Running Out of Food in the Last Year:     [address] in the Last Year:    Transportation Needs:     Lack of Transportation (Medical):  Lack of Transportation (Non-Medical):    Physical Activity:     Days of Exercise per Week:     Minutes of Exercise per Session:    Stress:     Feeling of Stress :    Social Connections:     Frequency of Communication with Friends and Family:     Frequency of Social Gatherings with Friends and Family:     Attends L[employer] day Services:     Active Member of Clubs or Organizations:     Attends Club or Organization Meetings:     Marital Status:    Intimate Partner Violence:     Fear of Current or Ex-Partner:     Emotionally Abused:     Physically Abused:     Sexually Abused:          PHYSICAL EXAM        ED Triage Vitals   BP Temp Temp Source Pulse Resp SpO2 Height Weight   08/10/21 0919 08/10/21 0922 08/10/21 0922 08/10/21 0919 08/10/21 0919 08/10/21 0919 08/10/21 0919 08/10/21 0919   (!) 166/103 100.4 °F (38 °C) Temporal 110 (!) 37 95 % 6' (1.829 m) 250 lb (113.4 kg)       Physical Exam  Vitals and nursing note reviewed. Constitutional:       General: He is in acute distress. Appearance: Normal appearance. He is obese. He is ill-appearing and diaphoretic. He is not toxic-appearing. HENT:      Head: Normocephalic and atraumatic. Right Ear: Tympanic membrane normal.      Left Ear: Tympanic membrane normal.      Nose: Nose normal. No congestion. Mouth/Throat:      Mouth: Mucous membranes are moist.      Pharynx: Oropharynx is clear. No oropharyngeal exudate. Eyes:      Extraocular Movements: Extraocular movements intact. Pupils: Pupils are equal, round, and reactive to light. Cardiovascular:      Rate and Rhythm: Tachycardia present. Pulses: Normal pulses. Heart sounds: Normal heart sounds. No murmur heard. Pulmonary:      Effort: Tachypnea and respiratory distress present.       Breath sounds: Normal breath sounds. No decreased breath sounds or wheezing. Abdominal:      General: Abdomen is flat. Bowel sounds are normal. There is no distension. Palpations: Abdomen is soft. Tenderness: There is no abdominal tenderness. There is no guarding. Musculoskeletal:         General: No swelling, tenderness, deformity or signs of injury. Normal range of motion. Cervical back: Normal range of motion and neck supple. No rigidity. No muscular tenderness. Right lower leg: No edema. Left lower leg: No edema. Lymphadenopathy:      Cervical: No cervical adenopathy. Skin:     General: Skin is warm. Capillary Refill: Capillary refill takes less than 2 seconds. Neurological:      General: No focal deficit present. Mental Status: He is alert and oriented to person, place, and time. Mental status is at baseline. Motor: No weakness. Gait: Gait normal.   Psychiatric:         Mood and Affect: Mood is anxious. Behavior: Behavior normal.         Thought Content:  Thought content normal.         Judgment: Judgment normal.           LABS:  Labs Reviewed   COVID-19, RAPID - Abnormal; Notable for the following components:       Result Value    SARS-CoV-2, NAAT DETECTED (*)     All other components within normal limits    Narrative:     Lance Darby  [facility] tel. [phone],  called Chantell Narayanan, 08/10/2021 09:52, by Fredy    COMPREHENSIVE METABOLIC PANEL - Abnormal; Notable for the following components:    Sodium 131 (*)     Chloride 93 (*)     CO2 11 (*)     Anion Gap 27 (*)     Glucose 378 (*)     Alkaline Phosphatase 129 (*)     Globulin 3.8 (*)     All other components within normal limits   LACTIC ACID, PLASMA - Abnormal; Notable for the following components:    Lactic Acid 2.3 (*)     All other components within normal limits   POCT ARTERIAL - Abnormal; Notable for the following components:    POC Sodium 130 (*)     POC Glucose 391 (*)     pH, Arterial 7.299 (*)     pCO2, Arterial 15 (*)     pO2, Arterial 59 (*)     HCO3, Arterial 7.2 (*)     Base Excess, Arterial -19 (*)     O2 Sat, Arterial 88 (*)     TCO2, Arterial 8 (*)     All other components within normal limits   POCT ARTERIAL - Abnormal; Notable for the following components:    POC Sodium 130 (*)     POC Glucose 375 (*)     pH, Arterial 7.308 (*)     pCO2, Arterial 17 (*)     pO2, Arterial 144 (*)     HCO3, Arterial 8.7 (*)     Base Excess, Arterial -18 (*)     O2 Sat, Arterial 99 (*)     TCO2, Arterial 9 (*)     All other components within normal limits   CULTURE, BLOOD 1   CULTURE, BLOOD 2   CBC   MAGNESIUM   TROPONIN   BRAIN NATRIURETIC PEPTIDE   HEMOGLOBIN A1C         MDM:   Vitals:    Vitals:    08/10/21 0934 08/10/21 0940 08/10/21 1000 08/10/21 1016   BP:  (!) 164/89 135/82    Pulse:  110 106    Resp: 24 24 27 28   Temp:       TempSrc:       SpO2: 99% 100% 97% 96%   Weight:       Height:           61year old male presents to the ED with c/o SOB. Triage records were reviewed. Medical records were reviewed. Nursing notes were reviewed and incorporated. Patient febrile (Tmax 100.4), tachypneic, tachycardic and hypoxic, ill appearing and in acute distress upon arrival.     Labs/Imaging:   EKG shows sinus tach, , normal axis, normal intervals, no ST changes. CXR with atelectasis/developing infiltrate. Labs reviewed, significant for hyperglycemia, suspect stress induced, insulin given. HypoMag - replaced IV. Lactate slightly elevated. ABG reviewed, patient transitioned from NRB to BiPAP with significant improvement in WOB and vitals. COVID + (has not been vaccinated) - given decadron IV x1. Case discussed with hospialist who accepts admission to ICU - I did confirm with patient he is a FULL CODE. CRITICAL CARE TIME   Total CriticalCare time was 60 minutes, excluding separately reportable procedures.   There was a high probability of clinically significant/life threatening deterioration in the patient's condition which required my urgent intervention. PROCEDURES:  Unlessotherwise noted below, none      Procedures      FINAL IMPRESSION      1. Hypoxia    2. COVID-19    3.  Acute respiratory failure with hypoxia [facility])          DISPOSITION/PLAN   DISPOSITION Admitted 08/10/2021 10:19:17 AM          APPLE Connor (electronically signed)  Attending Emergency Physician          APPLE Connor  08/10/21 [address], [place]  08/10/21 1021

## 2021-08-10 NOTE — PROGRESS NOTES
INTENSIVIST [address] [place] [address] [address] ICU Electronically signed by Maura Burroughs on 8/10/2021 at 12:52 PM  INF DIS CONSULT CALLED TO DR Krishnamurthy Electronically signed by Maura Burroughs on 8/10/2021 at 12:55 PM  ENDO CONSULT CALLED TO DR Garcia Pemberton Electronically signed by Maura Burroughs on 8/10/2021 at 4:22 PM

## 2021-08-10 NOTE — CONSULTS
Pulmonary and Critical Care Medicine  Consult Note  Encounter Date: 8/10/2021 12:52 PM    Mr. Augie Ramos is a 61 y.o. male  : 1965  Requesting Provider: Brandie Goyal DO    Reason for request: COVID, hypoxia           HISTORY OF PRESENT ILLNESS:    Patient is 61 y.o. presents to the hospital today with complaints of shortness of breath. Majority the history is obtained from medical records as the patient is currently on BiPAP and in some respiratory distress, however he is able to supplement some history. The patient denies any significant past medical history. He states the only medication he takes is Aleve generally once daily. He denies any history of diabetes, cancer, or heart disease. The patient states that he has been short of breath for the last 5 days. This has been worsening over the last several days and he ultimately presented to the emergency department by magdalena today. He states he was able to call 911. He was found to be significantly hypoxic in the emergency department and was placed on nasal cannula and then ultimately a nonrebreather. The patient was then placed on BiPAP. He was subsequently admitted to the hospitalist service. Work-up included a COVID-19 swab which was found to be positive. He also had a chest x-ray that demonstrated bilateral changes. Pulmonary was consulted secondary to COVID-19 and hypoxia. Patient states that he has not slept very well over the last 4 days and is requesting something to help him sleep. He denies any aches or pains out of the ordinary. He denies any other complaints at this time. Past Medical History:    Denies any significant past medical history    Past Surgical History:        Procedure Laterality Date    SCAPULA SURGERY Left        Social History:     reports that he has never smoked. He has never used smokeless tobacco. He reports that he does not drink alcohol and does not use drugs.     Family History:   History reviewed. No pertinent family history. Allergies:  Patient has no known allergies. MEDICATIONS during current hospitalization:    Continuous Infusions:   sodium chloride      dextrose         Scheduled Meds:   sodium chloride flush  5-40 mL Intravenous 2 times per day    enoxaparin  30 mg Subcutaneous BID    dexamethasone  6 mg Intravenous Q24H    insulin glargine  10 Units Subcutaneous Nightly    pantoprazole  40 mg Intravenous Daily    And    sodium chloride (PF)  10 mL Intravenous Daily    insulin lispro  0-18 Units Subcutaneous Q6H    insulin lispro  0-9 Units Subcutaneous Nightly    traZODone  100 mg Oral Nightly       PRN Meds:sodium chloride flush, sodium chloride, ondansetron **OR** ondansetron, polyethylene glycol, acetaminophen **OR** acetaminophen, potassium chloride, magnesium sulfate, ipratropium-albuterol, hydrALAZINE, glucose, dextrose, glucagon (rDNA), dextrose        REVIEW OF SYSTEMS:  ROS: 10 organs review of system is done including general, psychological, ENT, hematological, endocrine, respiratory, cardiovascular, gastrointestinal, musculoskeletal, neurological,  allergy and Immunology is done and is otherwise negative.     PHYSICAL EXAM:    Vitals:  /67   Pulse 104   Temp 100.4 °F (38 °C) (Temporal)   Resp (!) 38   Ht 6' (1.829 m)   Wt 250 lb (113.4 kg)   SpO2 90%   BMI 33.91 kg/m²     General: Resting in bed, fullface BiPAP in place, mild respiratory distress at rest  HEENT: Normocephalic, atraumatic, fullface BiPAP in place  Lungs : Crackles bilaterally, no wheezes, no rhonchi, mild respiratory distress at rest, tachypnea noted  Heart[de-identified] Tachycardic but regular  ABD: Obese, positive bowel sounds, soft, nontender to palpation  Extremities : Warm, dry, no edema noted  Neuro: Awake, alert, oriented x4, no motor or sensory deficits appreciated  Skin: No rashes    Data Review  Recent Labs     08/10/21  0915 08/10/21  0924 08/10/21  1008   WBC 7.6  --   --    HGB 14.5 15.6 15.0   HCT 43.2  --   --      --   --       Recent Labs     08/10/21  0915 08/10/21  0924 08/10/21  1008   *  --   --    K 3.9  --   --    CL 93*  --   --    CO2 11*  --   --    BUN 15  --   --    CREATININE 1.16 1.2 1.1   GLUCOSE 378*  --   --           ABGs:   Recent Labs     08/10/21  0924 08/10/21  1008   PHART 7.299* 7.308*   [id number] 15* 17*   PO2ART 59* 144*   [id number] 7.2* 8.7*   BEART -19* -18*   [id number] 88* 99*   [id number] 8* 9*     O2 Device: PAP (positive airway pressure)  O2 Flow Rate (L/min): 15 L/min  Lab Results   Component Value Date    LACTA 2.3 08/10/2021       Radiology:    XR CHEST PORTABLE    Result Date: 8/10/2021  EXAMINATION: XR CHEST PORTABLE CLINICAL HISTORY: SHORTNESS OF BREATH COMPARISONS: None available. FINDINGS: Patient leaning to left. Osseous structures intact. Cardiopericardial silhouette enlarged. Ill-defined areas of increased opacity are found in the right upper and right lower lung, with air bronchogram in the right lower lung. Air bronchogram increased opacity is also found in the left lower lung, and to lesser degree the left midlung zone. BILATERAL ATELECTASIS/PNEUMONIA. CARDIOMEGALY      Assessment/Plan:     1. Acute hypoxic respiratory failure--patient does have significant acute hypoxia secondary to COVID-19. He was started on Decadron emergency department. Remdesivir has been ordered. I will also add Actemra to his current regimen. At this time he is doing fairly well on BiPAP, however if he continues to be tachypneic he may ultimately tire out. We did have a discussion regarding this. He states that he would like to go on the ventilator if necessary. 2. COVID-19 pneumonitis--continue with Decadron and remdesivir. I did ask ID to see him. I also added Actemra to his current regimen. He should self proning stable, however given his current respiratory status I do not know this will be feasible at this time.   I did order Dopplers of his

## 2021-08-10 NOTE — ED TRIAGE NOTES
Pt states he became SOB and developed fever and cough on Saturday. Pt presents to ED this morning with progressive SOB.

## 2021-08-10 NOTE — ED NOTES
SBAR report to Radha Zee RN. Pt transferred to bed 8 in stable condition. Isolation precautions maintained. Continuous monitoring in place. Respiratory accompanied this RN at time of transfer.      Elaine Barrientos RN  08/10/21 101

## 2021-08-10 NOTE — PROGRESS NOTES
Order for Remdesivir received from Dr. MATHEWS [facility] [employer]    1)  Confirmed drug availability for complete patient course of therapy (200 mg IV x 1, then 100 mg daily on days 2-5)    2)  Inclusion Criteria:  Reviewed/Confirmed with provider criteria present   Adults, children (?3.5Kg, only use lyophilized powder), or pregnant women with proven COVID19 as defined by a positive nasopharyngeal swab, tracheal aspirate or sputum   SARS-CoV-2 PCR or a positive serology test requiring hospitalization for COVID-19-severe pneumonia.    Requiring supplemental oxygen     COVID-19  Specimen information: Nasopharyngeal Swab  Added: 8/10/2021 by COVID-19, Rapid (Collected 08/10/21)    3)  Recommend prioritization of patients who present with symptom onset < 14 days and within 10 days of acute care admission     4)  Exclusion Criteria:  Reviewed/Confirmed none of the following  present: (criteria in bold present during review; risk/benefit rationale of physician documented)    Requiring invasive or non-invasive mechanical ventilation  A) Consider use in patients requiring high-flow oxygen early in the disease state (based on symptom duration)    Use of more than 1 vasopressor agent prior to start of remdesivir    Already improving on current treatment/supportive regimen as evidenced by improving oxygenation, and/or impending discharge    Patients in whom the clinical team think death is in the immediate short-term whereby administration of RDV unlikely to change clinical outcome     5) Monitoring Parameters:  CMP (includes hepatic panel) x 5 days    Consider discontinuation of remdesivir if LFTs substantially increase following initiation of therapy (ie: 5x baseline lab values) or if ALT elevation is accompanied by signs or symptoms of liver inflammation    GFR < 30mL/min: Use with caution due to risk of cyclodextrin toxicity with IV solution as it accumulates in reduced renal clearance       Recent Labs     08/10/21  0924 08/10/21  1005 CREATININE 1.2 1.1   Estimated Creatinine Clearance: 96 mL/min (based on SCr of 1.1 mg/dL). .     Recent Labs     08/10/21  0915   ALT 28   AST 38     Remdesivir 200mg IV X 1 dose followed by 100mg IV Q24 hours X 4 days ordered, per consult from Dr. MATHEWS [facility], will continue to monitor.      Khadijah Peraza, PharmD   8/10/2021 12:54 PM

## 2021-08-10 NOTE — H&P
Department of Internal Medicine  General Internal Medicine  Attending History and Physical      CHIEF COMPLAINT:  SOB    Reason for Admission:  Acute hypoxic respiratory failure 2/2 COVID-19 pneumonia    History Obtained From:  patient, electronic medical record    HISTORY OF PRESENT ILLNESS:      The patient is a 61 y.o. male with no significant past medical history who presents with sob x 4 days which worsened this AM. States that he bought a pulse ox to monitor his oxygen and today it was reading in the low 70s so he called EMS. History mostly obtained through EMR and ED documentation as patient on BIPAP and with moderate respiratory distress at the time of exam. In the ED, he was initially on NRB and transitioned to BIPAP. ED workup showed positive COVID. Pt was febrile at 100.4F, tachypneic and tachycardic. Hyperglycemia noted, but patient has not seen a physician in a while and no known history of diabetes. Pt was given decadron in the ED as well as Mg and insulin and admitted to the ICU for close monitoring. Past Medical History:    History reviewed. No pertinent past medical history. Past Surgical History:        Procedure Laterality Date    SCAPULA SURGERY Left        Medications Prior to Admission:    Medications Prior to Admission: aspirin 81 MG EC tablet, Take 81 mg by mouth daily  Naproxen Sodium (ALEVE) 220 MG CAPS, Take 220 mg by mouth as needed for Pain    Allergies:  Patient has no known allergies. Social History:   Never smoker, no etoh, no drugs    Family History:   History reviewed. No pertinent family history.   REVIEW OF SYSTEMS:  Review of systems not obtained due to patient factors - BIPAP, respiratory distress  PHYSICAL EXAM:    Vitals:  BP (!) 155/89   Pulse 90   Temp 98.2 °F (36.8 °C) (Oral)   Resp 28   Ht 6' (1.829 m)   Wt 254 lb 10.1 oz (115.5 kg)   SpO2 92%   BMI 34.53 kg/m²     Constitutional: Awake and alert in mild respiratory distress with accessory muscle use on BIPAP  Head: Normocephalic, atraumatic  Eyes: EOMI, PERRLA  Neck: neck supple, trachea midline  Lungs: Good inspiratory effort, diminished breath sounds bilaterally  Heart: RRR, normal S1 and S2  GI: Soft, non-distended,  +BS  MSK: no edema noted  Skin: warm, dry       DATA:  CBC:   Lab Results   Component Value Date    WBC 7.6 08/10/2021    RBC 4.82 08/10/2021    HGB 15.0 08/10/2021    HCT 43.2 08/10/2021    MCV 89.7 08/10/2021    MCH 30.0 08/10/2021    MCHC 33.4 08/10/2021    RDW 12.9 08/10/2021     08/10/2021     CMP:    Lab Results   Component Value Date     08/10/2021    K 3.9 08/10/2021    CL 93 08/10/2021    CO2 11 08/10/2021    BUN 15 08/10/2021    CREATININE 1.1 08/10/2021    CREATININE 1.16 08/10/2021    GFRAA >60 08/10/2021    LABGLOM >60 08/10/2021    GLUCOSE 378 08/10/2021    PROT 7.5 08/10/2021    LABALBU 3.7 08/10/2021    CALCIUM 8.9 08/10/2021    BILITOT 0.3 08/10/2021    ALKPHOS 129 08/10/2021    AST 38 08/10/2021    ALT 28 08/10/2021     ASSESSMENT AND PLAN:      # Acute hypoxic respiratory failure 2/2 COVID-19 pneumonia  - COVID positive  - CXR with bilateral infiltrates  - hypoxic 70s on RA. Placed on NRB by EMS and advanced to BIPAP upon arrival to the ED  - admit to ICU. Intensivist consulted  - start Remdesivir (day 1/5), decadron, lovenox ppx  - inflammatory workup pending    # New diagnosis of diabetes  - no previous history  - hyperglycemic on admission. A1C 13.7  - will consult endocrinology  - ISS and lantus - monitor closely while on steroids  - diabetic education    DVT: lovenox per COVID protocol    Disposition: Pt admitted to the ICU on BIPAP for severe COVID-19 pneumonia. Wean O2 as tolerated. Intubate if worsening respiratory status. Cont remdesivir, decadron. New diagnosis of diabetes. Endocrinology consulted. Intensivist consulted. Anticipated length of stay greater than 48 hours.       [facility] DO  Internal Medicine

## 2021-08-10 NOTE — CONSULTS
Infectious Disease     Patient Name: Mynor Echols  Date: 8/10/2021  YOB: 1965  Medical Record Number: [id number]              History of Present Illness:  Obesity diabetes(new diagnosis)      Patient presents for a history of shortness of breath steadily worsening admission through the emergency room pulse ox was as low as 70s on room air placed on BiPAP transferred to the intensive care unit COVID-19 positive    Fever was 100.4            EXAMINATION: XR CHEST PORTABLE       CLINICAL HISTORY: SHORTNESS OF BREATH       COMPARISONS: None available.       FINDINGS: Patient leaning to left. Osseous structures intact. Cardiopericardial silhouette enlarged. Ill-defined areas of increased opacity are found in the right upper and right lower lung, with air bronchogram in the right lower lung. Air bronchogram    increased opacity is also found in the left lower lung, and to lesser degree the left midlung zone.           Impression   BILATERAL ATELECTASIS/PNEUMONIA. CARDIOMEGALY       Ferritin [[id number]] (Abnormal) Collected: 08/10/21 1456      Specimen: Blood Updated: 08/10/21 1545      Ferritin 1,011.0 ng/mL      C-REACTIVE PROTEIN [[id number]] (Abnormal) Collected: 08/10/21 1456     Specimen: Blood Updated: 08/10/21 1532      .0 mg/L              Review of Systems   Constitutional: Positive for chills, diaphoresis, fatigue and fever. HENT: Negative. Eyes: Negative. Respiratory: Positive for cough and shortness of breath. Cardiovascular: Positive for chest pain. Gastrointestinal: Positive for nausea. Negative for abdominal distention, abdominal pain, diarrhea and vomiting. Endocrine: Negative. Genitourinary: Negative. Musculoskeletal: Positive for arthralgias. Neurological: Negative. Hematological: Negative. Psychiatric/Behavioral: Negative.         Review of Systems: All 14 review of systems negative other than as stated above    Social History     Tobacco Use    Smoking status: Never Smoker    Smokeless tobacco: Never Used   Vaping Use    Vaping Use: Never used   Substance Use Topics    Alcohol use: Never    Drug use: Never         History reviewed. No pertinent past medical history. Past Surgical History:   Procedure Laterality Date    SCAPULA SURGERY Left          No current facility-administered medications on file prior to encounter. Current Outpatient Medications on File Prior to Encounter   Medication Sig Dispense Refill    aspirin 81 MG EC tablet Take 81 mg by mouth daily      Naproxen Sodium (ALEVE) 220 MG CAPS Take 220 mg by mouth as needed for Pain         No Known Allergies      History reviewed. No pertinent family history. Physical Exam:      Physical Exam  Constitutional:       General: He is in acute distress. Appearance: He is obese. He is ill-appearing. Eyes:      Pupils: Pupils are equal, round, and reactive to light. Cardiovascular:      Heart sounds: Normal heart sounds. No murmur heard. Pulmonary:      Effort: Pulmonary effort is normal.      Breath sounds: No wheezing, rhonchi or rales. Abdominal:      General: Abdomen is flat. There is no distension. Palpations: There is no mass. Tenderness: There is no abdominal tenderness. There is no right CVA tenderness, left CVA tenderness, guarding or rebound. Hernia: No hernia is present. Musculoskeletal:         General: No swelling, tenderness, deformity or signs of injury. Cervical back: Normal range of motion and neck supple. No rigidity or tenderness. Right lower leg: No edema. Left lower leg: No edema. Skin:     General: Skin is warm. Neurological:      Mental Status: He is alert. Blood pressure 127/77, pulse 83, temperature 99.6 °F (37.6 °C), temperature source Axillary, resp. rate (!) 35, height 6' (1.829 m), weight 254 lb 10.1 oz (115.5 kg), SpO2 93 %.       .   Lab Results   Component Value Date    WBC 7.6 08/10/2021    HGB 15.0 08/10/2021    HCT 43.2 08/10/2021    MCV 89.7 08/10/2021     08/10/2021     Lab Results   Component Value Date     08/10/2021    K 3.5 08/10/2021    CL 96 08/10/2021    CO2 11 08/10/2021    BUN 16 08/10/2021    CREATININE 0.88 08/10/2021    GLUCOSE 292 08/10/2021    CALCIUM 9.0 08/10/2021                ASSESSMENT:  Patient Active Problem List   Diagnosis    COVID-19         PLAN:    COVID-19 pneumonia  New diagnosis of diabetes    On dexamethasone Lovenox remdesivir  Given 1 dose of Actemra    No evidence of secondary bacterial pneumonia

## 2021-08-11 NOTE — PROGRESS NOTES
General: Abdomen is flat. There is no distension. Palpations: There is no mass. Tenderness: There is no abdominal tenderness. There is no right CVA tenderness, left CVA tenderness, guarding or rebound. Hernia: No hernia is present. Blood pressure 133/79, pulse 82, temperature 98.6 °F (37 °C), temperature source Axillary, resp. rate 30, height 6' (1.829 m), weight 254 lb 10.1 oz (115.5 kg), SpO2 (!) 89 %.       .   Lab Results   Component Value Date    WBC 5.2 08/11/2021    HGB 14.4 08/11/2021    HCT 43.2 08/11/2021    MCV 89.8 08/11/2021     08/11/2021     Lab Results   Component Value Date     08/11/2021    K 3.9 08/11/2021     08/11/2021    CO2 19 08/11/2021    BUN 22 08/11/2021    CREATININE 0.98 08/11/2021    GLUCOSE 279 08/11/2021    CALCIUM 8.9 08/11/2021            PLAN:    COVID-19 pneumonia  New diagnosis of diabetes    On dexamethasone Lovenox remdesivir  Given 1 dose of Actemra    No evidence of secondary bacterial pneumonia

## 2021-08-11 NOTE — PROGRESS NOTES
Spiritual Care Services     Summary of Visit:      Spiritual Assessment/Intervention/Outcomes:    Encounter Summary  Services provided to[de-identified] Patient  Referral/Consult From[de-identified] Rounding  Support System: Unknown  Continue Visiting: No  Complexity of Encounter: Low  Length of Encounter: 15 minutes  Spiritual Assessment Completed: Yes  Routine  Type: Initial  Assessment: Calm, Approachable  Intervention: [place], Sustaining presence/ Ministry of presence  Outcome: Receptive                          Values / Beliefs  Do you have any ethnic, cultural, sacramental, or spiritual Zoroastrian needs you would like us to be aware of while you are in the hospital?: No    Care Plan:        [id number] [address]   Electronically signed by Miguel Cottrell on 8/11/21 at 10:36 AM EDT     To reach a  for emotional and spiritual support, place an [facility] consult request.   If a  is needed immediately, dial 0 and ask to page the on-call .

## 2021-08-11 NOTE — CARE COORDINATION
Rounds done outside of room early am with ICU team. Pt was on bipap continuous and he is on highflow at present. I attempted to call only number listed and that just rings fast busy signal. I asked if pt has room phone and nurse will check next time they are in with him. I also let nurse know he has no contacts listed and we have no one else to call at present. Per notes he did not have any medical hx listed and now will have endocrinology consult. He may have O2 needs down the road when he is closer to dc we will assess for that or any other needs. He is reported to be from home alone and was independent PTA.

## 2021-08-11 NOTE — PROGRESS NOTES
0700: Assumed care of patient     0800: Assessed and meds given. See NPR and MAR for details. 0900: Multidisciplinary rounds taking place at this time. 1100: Patient transitioned to heated high flow nasal cannula to allow him to eat. Tolerating well at this time. Diet order obtained. 1200: Patient assessed, see [address] for details. 1300: Patient's son called for an update. Updated son with patient's permission. 1600: Patient assessed, see [address] for details. 1730: Patient placed back on BiPAP for the night, tolerated high flow well throughout the day. Patient is alert and oriented x4.

## 2021-08-11 NOTE — PROGRESS NOTES
Pulmonary & Critical Care Medicine ICU Progress Note    Subjective:     No overnight events reported. He states he did sleep somewhat overnight. He does remain on BiPAP. He does state that he is thirsty but is not interested much in eating.     EXAM:  General: No acute distress, resting comfortably in bed  HEENT: Normocephalic, atraumatic, fullface BiPAP in place  Lungs : Crackles bilaterally, no wheezes, no rhonchi, no respiratory distress  Heart: Regular rate and rhythm  ABD: Obese, positive bowel sounds, soft, nontender to palpation  Extremities : Warm, dry  Neuro: Awake, alert, oriented x4, moves all 4 extremities equally  Skin: No rashes    MV Settings:     / / /FiO2 : 80 %     Recent Labs     08/10/21  0924 08/10/21  1008   PHART 7.299* 7.308*   [id number] 15* 17*   PO2ART 59* 144*         IV:   sodium chloride      dextrose         Vitals:  /72   Pulse 83   Temp 98.2 °F (36.8 °C) (Axillary)   Resp (!) 32   Ht 6' (1.829 m)   Wt 254 lb 10.1 oz (115.5 kg)   SpO2 91%   BMI 34.53 kg/m²          Intake/Output Summary (Last 24 hours) at 8/11/2021 1000  Last data filed at 8/11/2021 0928  Gross per 24 hour   Intake 444.73 ml   Output 1930 ml   Net -1485.27 ml       Medications:  Scheduled Meds:   sodium chloride flush  5-40 mL Intravenous 2 times per day    dexamethasone  6 mg Intravenous Q24H    pantoprazole  40 mg Intravenous Daily    And    sodium chloride (PF)  10 mL Intravenous Daily    traZODone  100 mg Oral Nightly    enoxaparin  0.5 mg/kg Subcutaneous BID    remdesivir IVPB  100 mg Intravenous Q24H    insulin glargine  40 Units Subcutaneous Nightly    insulin lispro  12 Units Subcutaneous 4x Daily    insulin lispro  0-12 Units Subcutaneous 4x Daily       Labs:   CBC:   Recent Labs     08/10/21  0915 08/10/21  0915 08/10/21  0924 08/10/21  1008 08/11/21  0539   WBC 7.6  --   --   --  5.2   HGB 14.5   < > 15.6 15.0 14.4   HCT 43.2  --   --   --  43.2   MCV 89.7  --   --   --  89.8   PLT 253  --   --   --  250    < > = values in this interval not displayed. BMP:   Recent Labs     08/10/21  0915 08/10/21  0924 08/10/21  1008 08/10/21  1456 08/11/21  0539   *  --   --  131* 135   K 3.9  --   --  3.5 3.9   CL 93*  --   --  96 100   CO2 11*  --   --  11* 19*   BUN 15  --   --  16 22*   CREATININE 1.16   < > 1.1 0.88 0.98    < > = values in this interval not displayed. LIVER PROFILE:   Recent Labs     08/10/21  0915 08/11/21  0539   AST 38 35   ALT 28 27   BILITOT 0.3 0.3   ALKPHOS 129* 106*     PT/INR: No results for input(s): PROTIME, INR in the last 72 hours. APTT: No results for input(s): APTT in the last 72 hours. UA:No results for input(s): NITRITE, COLORU, PHUR, LABCAST, WBCUA, RBCUA, MUCUS, TRICHOMONAS, YEAST, BACTERIA, CLARITYU, SPECGRAV, LEUKOCYTESUR, UROBILINOGEN, BILIRUBINUR, BLOODU, GLUCOSEU, AMORPHOUS in the last 72 hours. Invalid input(s): Calvin Lopez    Radiology:    CXR: 2-view: No results found for this or any previous visit. Portable: Results for orders placed during the hospital encounter of 08/10/21    XR CHEST PORTABLE    Narrative  EXAMINATION: XR CHEST PORTABLE    CLINICAL HISTORY: SHORTNESS OF BREATH    COMPARISONS: None available. FINDINGS: Patient leaning to left. Osseous structures intact. Cardiopericardial silhouette enlarged. Ill-defined areas of increased opacity are found in the right upper and right lower lung, with air bronchogram in the right lower lung. Air bronchogram  increased opacity is also found in the left lower lung, and to lesser degree the left midlung zone. Impression  BILATERAL ATELECTASIS/PNEUMONIA. CARDIOMEGALY      Assessment/Plan:    1. Acute hypoxic respiratory failure-he does remain on BiPAP at this time. He will be transitioned to Airvo to see if this will allow him to eat and drink. This secondary to COVID-19 pneumonitis.   His clinical status has improved and he is less tachypneic than he was yesterday. 2. COVID-19 pneumonitis-he does remain on Decadron and remdesivir. He did receive a dose of Actemra yesterday. ID does continue to follow. His Dopplers were all negative. He does remain on accelerated dose of Lovenox for DVT prophylaxis. 3. Diabetes mellitus-patient previously did not have a known diagnosis of diabetes. He was evaluated by endocrinology yesterday and started on insulin. 4. Metabolic acidosis-this has been improving. Continue to monitor and continue with supportive care. Nutrition: Diabetic diet with supplements    Prophylaxis: Lovenox 0.5 mg/kg twice daily for DVT prophylaxis. Protonix for GI prophylaxis. Code Status: Full code    This is a 61 y.o. male who is critically ill secondary to acute hypoxic respiratory failure. I have spent a total of 36 minutes of critical care time with this patient, review of the chart, and discussion with the bedside staff; excluding any billable procedures.     Electronically signed by Abdiel Mcelroy DO, on 8/11/2021 at 10:00 AM

## 2021-08-11 NOTE — PROGRESS NOTES
Shift Summary    Patient a/ox4. Follows commands, responds appropriately. Denies pain, sob, cp. Moves all extremities. Remains on bipap. tachypnic throughout the light. Dyspnea at rest and with exertion. Breathing became more diaphragmatic and using accessory muscles later in shift but vitals remain stable.

## 2021-08-11 NOTE — PROGRESS NOTES
Units, 40 Units, Subcutaneous, Nightly  insulin lispro (HUMALOG) injection vial 12 Units, 12 Units, Subcutaneous, 4x Daily  insulin lispro (HUMALOG) injection vial 0-12 Units, 0-12 Units, Subcutaneous, 4x Daily  Physical    VITALS:  /82   Pulse 82   Temp 98.2 °F (36.8 °C) (Axillary)   Resp (!) 35   Ht 6' (1.829 m)   Wt 254 lb 10.1 oz (115.5 kg)   SpO2 90%   BMI 34.53 kg/m²   Constitutional: Awake and alert with no respiratory distress lying in bed comfortably  Head: Normocephalic, atraumatic  Eyes: EOMI, PERRLA  Neck: neck supple, trachea midline  Lungs: no respiratory distress  Heart: RRR  GI: non-distended  MSK: no edema noted     Data    CBC:   Lab Results   Component Value Date    WBC 5.2 08/11/2021    RBC 4.81 08/11/2021    HGB 14.4 08/11/2021    HCT 43.2 08/11/2021    MCV 89.8 08/11/2021    MCH 29.9 08/11/2021    MCHC 33.3 08/11/2021    RDW 13.1 08/11/2021     08/11/2021     CMP:    Lab Results   Component Value Date     08/11/2021    K 3.9 08/11/2021     08/11/2021    CO2 19 08/11/2021    BUN 22 08/11/2021    CREATININE 0.98 08/11/2021    GFRAA >60.0 08/11/2021    LABGLOM >60.0 08/11/2021    GLUCOSE 279 08/11/2021    PROT 7.0 08/11/2021    LABALBU 3.3 08/11/2021    CALCIUM 8.9 08/11/2021    BILITOT 0.3 08/11/2021    ALKPHOS 106 08/11/2021    AST 35 08/11/2021    ALT 27 08/11/2021       ASSESSMENT AND PLAN      # Acute hypoxic respiratory failure 2/2 COVID-19 pneumonia  - COVID positive  - CXR with bilateral infiltrates  - hypoxic 70s on RA. Placed on NRB by EMS and advanced to BIPAP upon arrival to the ED. Now on high flow nasal cannula. Wean as tolerated  - admit to ICU. Intensivist consulted  - cont Remdesivir (day 2/5), decadron, lovenox ppx. Sp Actemra x1  - inflammatory markers elevated  - US negative for DVT     # New diagnosis of diabetes  - no previous history  - hyperglycemic on admission.  A1C 13.7  - consult endocrinology  - ISS and lantus - monitor closely while on steroids  - diabetic education     DVT: lovenox per COVID protocol     Disposition: Pt admitted to the ICU on BIPAP for severe COVID-19 pneumonia. Wean O2 as tolerated- now on high flow. Intubate if worsening respiratory status. Cont remdesivir, decadron. New diagnosis of diabetes. Endocrinology consulted. Intensivist consulted.       Mildred Doherty,   Internal Medicine

## 2021-08-11 NOTE — CONSULTS
[address]                      [address], [address] [facility]                                  CONSULTATION    PATIENT NAME: Surekha Shaver                      :        1965  MED REC NO:   [id number]                            ROOM:       09  ACCOUNT NO:   [de-identified]                           ADMIT DATE: 08/10/2021  PROVIDER:     Ronnie Barnes MD    CONSULT DATE:  08/10/2021    ENDOCRINE CONSULTATION    REFERRING PROVIDER:  [facility] IMER MCKENZIE    REASON FOR CONSULTATION:  Newly diagnosed type 2 diabetes. CHIEF COMPLAINT AND HISTORY OF PRESENT ILLNESS:  Obtained through prior  H and P. This is a virtual visit. The patient was seen through glass  window due to COVID-19 infection/protocol. The patient is a 50-year-old  male with no prior history of diabetes admitted because of hypoxemic  respiratory failure secondary to COVID-19 pneumonia, was put on BiPAP. Complained of shortness of breath for 4 days, worsened this morning. Did have low-grade fever of 100.4, tachypnea with tachycardia. No  history of diabetes. Was also given Decadron. Started on insulin. Blood sugars have been staying in the 200-400 range. Hemoglobin A1c was  elevated at 13.7. Chemistries:  Sodium 131, potassium 3.5, chloride 96,  CO2 was 11, BUN 16, creatinine 0.8. The patient was started on Humalog  coverage. Diet is n.p.o. at this time. Also started on Decadron 6 mg  every 24 hours, Protonix, remdesivir and did get a dose of Actemra. PAST MEDICAL HISTORY:  Essentially unremarkable. PAST SURGICAL HISTORY:  Scapular surgery. PERSONAL AND SOCIAL HISTORY:  Denies any smoking, alcohol or drugs. FAMILY HISTORY:  Reviewed, noncontributory. MEDICATIONS:  Home medications included just Aleve. ALLERGIES:  None. REVIEW OF SYSTEMS:  Other than hypoxia and weakness, 14-point review of  systems was essentially unremarkable.     PHYSICAL EXAMINATION:  As per hospitalist.    ASSESSMENT:  Type 2 diabetes worsened due to use of steroid, COVID-19  pneumonia. PLAN:  Start the patient on Lantus 40 units at night, Humalog 12 units 4  times a day plus medium dose sliding scale 4 times daily. Monitor  glycemic control closely. Would advance diet based on the patient's  p.o. status and BiPAP use status. Blood sugar goal here 140-200. Also  ordered diabetes education. Monitor glycemic control closely. Virtual visit due to COVID-19 protocol. Total time spent was 55 minutes. Thank you for the consult.         Nuvia Zamora MD    D: 08/10/2021 22:40:19       T: 08/10/2021 22:43:30     [email or web address]  Job#: [id number]     Doc#: [id number]    CC:

## 2021-08-11 NOTE — PROGRESS NOTES
Progress Note  Date:2021       Room:[place] [id number]  Patient Valencia Shields     YOB: 1965     Age:56 y.o. Chief complaint new onset diabetes/a new onset type 2 diabetes    Subjective    Subjective:  Symptoms:  Stable. He reports shortness of breath, malaise and weakness. Diet:  Poor intake. Activity level: Impaired due to weakness. Review of Systems   Respiratory: Positive for shortness of breath. Neurological: Positive for weakness. All other systems reviewed and are negative. Objective         Vitals Last 24 Hours:  TEMPERATURE:  Temp  Av.7 °F (36.5 °C)  Min: 96.7 °F (35.9 °C)  Max: 98.2 °F (36.8 °C)  RESPIRATIONS RANGE: Resp  Av.1  Min: 0  Max: 42  PULSE OXIMETRY RANGE: SpO2  Av.6 %  Min: 89 %  Max: 99 %  PULSE RANGE: Pulse  Av.8  Min: 70  Max: 95  BLOOD PRESSURE RANGE: Systolic (09GTC), YQI:286 , Min:119 , VMZ:273   ; Diastolic (64MMN), GKA:63, Min:68, Max:92    I/O (24Hr): Intake/Output Summary (Last 24 hours) at 2021 1516  Last data filed at 2021 0928  Gross per 24 hour   Intake 344.73 ml   Output 1230 ml   Net -885.27 ml     Objective:  General Appearance:  Ill-appearing. Vital signs: (most recent): Blood pressure 133/82, pulse 82, temperature 98.2 °F (36.8 °C), temperature source Axillary, resp. rate (!) 35, height 6' (1.829 m), weight 254 lb 10.1 oz (115.5 kg), SpO2 90 %. Vital signs are normal.      Labs/Imaging/Diagnostics    Labs:  CBC:  Recent Labs     08/10/21  0915 08/10/21  0915 08/10/21  0924 08/10/21  1008 21  0539   WBC 7.6  --   --   --  5.2   RBC 4.82  --   --   --  4.81   HGB 14.5   < > 15.6 15.0 14.4   HCT 43.2  --   --   --  43.2   MCV 89.7  --   --   --  89.8   RDW 12.9  --   --   --  13.1     --   --   --  250    < > = values in this interval not displayed.      CHEMISTRIES:  Recent Labs     08/10/21  0915 08/10/21  0924 08/10/21  1008 08/10/21  1456 21  0539   *  --   --  131* 135   K 3.9  --   --  3.5 3.9   CL 93*  --   --  96 100   CO2 11*  --   --  11* 19*   BUN 15  --   --  16 22*   CREATININE 1.16   < > 1.1 0.88 0.98   GLUCOSE 378*  --   --  292* 279*   MG 1.7  --   --  2.2  --     < > = values in this interval not displayed. PT/INR:No results for input(s): PROTIME, INR in the last 72 hours. APTT:No results for input(s): APTT in the last 72 hours. LIVER PROFILE:  Recent Labs     08/10/21  0915 08/11/21  0539   AST 38 35   ALT 28 27   BILITOT 0.3 0.3   ALKPHOS 129* 106*       Imaging Last 24 Hours:  XR CHEST PORTABLE    Result Date: 8/10/2021  EXAMINATION: XR CHEST PORTABLE CLINICAL HISTORY: SHORTNESS OF BREATH COMPARISONS: None available. FINDINGS: Patient leaning to left. Osseous structures intact. Cardiopericardial silhouette enlarged. Ill-defined areas of increased opacity are found in the right upper and right lower lung, with air bronchogram in the right lower lung. Air bronchogram increased opacity is also found in the left lower lung, and to lesser degree the left midlung zone. BILATERAL ATELECTASIS/PNEUMONIA. CARDIOMEGALY    US DUP UPPER EXTREMITY RIGHT VENOUS    Result Date: 8/10/2021  EXAMINATION: BILATERAL UPPER EXTREMITY DEEP VENOUS ULTRASOUND WITH DOPPLER IMAGING CLINICAL HISTORY:  Upper extremity swelling COMPARISON: None TECHNIQUE:  [place] scale ultrasound with compression maneuvers where accessible, color and spectral Doppler of the internal jugular, subclavian, axillary and brachial veins was performed bilaterally. Grey scale with and without compression of the basilic and cephalic  veins was also performed bilaterally. Images were obtained and stored in a permanent archive. RESULT: Examination limited by intravenous lines.  RIGHT UPPER EXTREMITY DEEP VEINS: Internal Jugular vein: Normal compression, normal spontaneous flow, Subclavian vein:  Normal, spontaneous flow, Axillary vein:  Normal compression, normal spontaneous flow, Brachial vein:  Normal compression, normal spontaneous flow, RIGHT UPPER EXTREMITY SUPERFICIAL VEINS: Basilic vein: Normal compression Cephalic vein:  Normal compression LEFT UPPER EXTREMITY DEEP VEINS: Internal Jugular vein: Normal compression, normal spontaneous flow, Subclavian vein:  Normal, spontaneous flow, Axillary vein:  Normal compression, normal spontaneous flow, Brachial vein:  Normal compression, normal spontaneous flow, LEFT UPPER EXTREMITY SUPERFICIAL VEINS: Basilic vein: Normal compression Cephalic vein:  Normal compression EXAMINATION: BILATERAL LOWER EXTREMITY DEEP VENOUS ULTRASOUND WITH DOPPLER IMAGING CLINICAL HISTORY: SWELLING COMPARISON: None TECHNIQUE:  Gray scale with compression maneuvers, Color Doppler and Spectral Doppler at rest and with augmentation of the distal external iliac, common femoral, femoral and popliteal veins was performed. Grey scale with compression maneuvers of the peroneal and posterior tibial veins was performed. The great and small saphenous veins were imaged in grey scale with compression maneuvers at their insertion to the deep system. Imaging was performed in both lower extremities. Images were obtained and stored in a permanent archive. RESULT: RIGHT LOWER EXTREMITY PROXIMAL DEEP VEINS Distal External Iliac and Common Femoral Veins:      Compression: Normal      Doppler: Normal, spontaneous respirophasic flow                     Normal response to augmentation  Femoral vein:      Compression: Normal      Doppler: Normal, spontaneous flow                     Normal response to augmentation Popliteal vein:      Compression: Normal      Doppler: Normal, spontaneous flow                     Normal response to augmentation CALF DEEP VEINS Peroneal veins: Normal compression Posterior tibial veins: Normal compression Gastrocnemius and Soleal veins: Not imaged SUPERFICIAL VEINS Great saphenous: Patent and compressible at insertion into common femoral vein; not otherwise assessed.  Small Saphenous:Patent and compressible in the proximal calf, not otherwise assessed. LEFT LOWER EXTREMITY PROXIMAL DEEP VEINS Distal External Iliac and Common Femoral Veins:      Compression: Normal      Doppler: Normal, spontaneous respirophasic flow                      Normal response to augmentation Femoral vein:      Compression: Normal      Doppler: Normal, spontaneous flow                      Normal response to augmentation  Popliteal vein:      Compression: Normal      Doppler: Normal, spontaneous flow                      Normal response to augmentation CALF DEEP VEINS Peroneal veins: Normal compression Posterior tibial veins: Normal compression Gastrocnemius and Soleal veins: Not imaged SUPERFICIAL VEINS Great saphenous: Patent at insertion into common femoral vein; not otherwise assessed. Small Saphenous: Patent and compressible in the proximal calf, not otherwise assessed. NEGATIVE STUDY FOR ACUTE DVT IN THE RIGHT AND LEFT UPPER EXTREMITIES. NEGATIVE STUDY FOR SUPERFICIAL THROMBOPHLEBITIS IN THE RIGHT AND LEFT  UPPER EXTREMITIES. LIMITED EVALUATION OF THE CALF DUE TO TO PATIENT BODY HABITUS. NEGATIVE STUDY FOR ACUTE PROXIMAL DVT IN THE RIGHT AND LEFT LOWER EXTREMITIES. NEGATIVE STUDY FOR ACUTE CALF DVT IN THE RIGHT AND LEFT LOWER EXTREMITIES. NEGATIVE STUDY FOR SUPERFICIAL THROMBOPHLEBITIS IN THE RIGHT AND LEFT LOWER EXTREMITIES. US DUP UPPER EXTREMITY LEFT VENOUS    Result Date: 8/10/2021  EXAMINATION: BILATERAL UPPER EXTREMITY DEEP VENOUS ULTRASOUND WITH DOPPLER IMAGING CLINICAL HISTORY:  Upper extremity swelling COMPARISON: None TECHNIQUE:  [place] scale ultrasound with compression maneuvers where accessible, color and spectral Doppler of the internal jugular, subclavian, axillary and brachial veins was performed bilaterally. Grey scale with and without compression of the basilic and cephalic  veins was also performed bilaterally. Images were obtained and stored in a permanent archive.  RESULT: Examination limited by intravenous lines. RIGHT UPPER EXTREMITY DEEP VEINS: Internal Jugular vein: Normal compression, normal spontaneous flow, Subclavian vein:  Normal, spontaneous flow, Axillary vein:  Normal compression, normal spontaneous flow, Brachial vein:  Normal compression, normal spontaneous flow, RIGHT UPPER EXTREMITY SUPERFICIAL VEINS: Basilic vein: Normal compression Cephalic vein:  Normal compression LEFT UPPER EXTREMITY DEEP VEINS: Internal Jugular vein: Normal compression, normal spontaneous flow, Subclavian vein:  Normal, spontaneous flow, Axillary vein:  Normal compression, normal spontaneous flow, Brachial vein:  Normal compression, normal spontaneous flow, LEFT UPPER EXTREMITY SUPERFICIAL VEINS: Basilic vein: Normal compression Cephalic vein:  Normal compression EXAMINATION: BILATERAL LOWER EXTREMITY DEEP VENOUS ULTRASOUND WITH DOPPLER IMAGING CLINICAL HISTORY: SWELLING COMPARISON: None TECHNIQUE:  Gray scale with compression maneuvers, Color Doppler and Spectral Doppler at rest and with augmentation of the distal external iliac, common femoral, femoral and popliteal veins was performed. Grey scale with compression maneuvers of the peroneal and posterior tibial veins was performed. The great and small saphenous veins were imaged in grey scale with compression maneuvers at their insertion to the deep system. Imaging was performed in both lower extremities. Images were obtained and stored in a permanent archive.  RESULT: RIGHT LOWER EXTREMITY PROXIMAL DEEP VEINS Distal External Iliac and Common Femoral Veins:      Compression: Normal      Doppler: Normal, spontaneous respirophasic flow                     Normal response to augmentation  Femoral vein:      Compression: Normal      Doppler: Normal, spontaneous flow                     Normal response to augmentation Popliteal vein:      Compression: Normal      Doppler: Normal, spontaneous flow                     Normal response to augmentation CALF DEEP VEINS Peroneal veins: Normal compression Posterior tibial veins: Normal compression Gastrocnemius and Soleal veins: Not imaged SUPERFICIAL VEINS Great saphenous: Patent and compressible at insertion into common femoral vein; not otherwise assessed. Small Saphenous:Patent and compressible in the proximal calf, not otherwise assessed. LEFT LOWER EXTREMITY PROXIMAL DEEP VEINS Distal External Iliac and Common Femoral Veins:      Compression: Normal      Doppler: Normal, spontaneous respirophasic flow                      Normal response to augmentation Femoral vein:      Compression: Normal      Doppler: Normal, spontaneous flow                      Normal response to augmentation  Popliteal vein:      Compression: Normal      Doppler: Normal, spontaneous flow                      Normal response to augmentation CALF DEEP VEINS Peroneal veins: Normal compression Posterior tibial veins: Normal compression Gastrocnemius and Soleal veins: Not imaged SUPERFICIAL VEINS Great saphenous: Patent at insertion into common femoral vein; not otherwise assessed. Small Saphenous: Patent and compressible in the proximal calf, not otherwise assessed. NEGATIVE STUDY FOR ACUTE DVT IN THE RIGHT AND LEFT UPPER EXTREMITIES. NEGATIVE STUDY FOR SUPERFICIAL THROMBOPHLEBITIS IN THE RIGHT AND LEFT  UPPER EXTREMITIES. LIMITED EVALUATION OF THE CALF DUE TO TO PATIENT BODY HABITUS. NEGATIVE STUDY FOR ACUTE PROXIMAL DVT IN THE RIGHT AND LEFT LOWER EXTREMITIES. NEGATIVE STUDY FOR ACUTE CALF DVT IN THE RIGHT AND LEFT LOWER EXTREMITIES. NEGATIVE STUDY FOR SUPERFICIAL THROMBOPHLEBITIS IN THE RIGHT AND LEFT LOWER EXTREMITIES.      US DUP LOWER EXTREMITIES BILATERAL VENOUS    Result Date: 8/10/2021  EXAMINATION: BILATERAL UPPER EXTREMITY DEEP VENOUS ULTRASOUND WITH DOPPLER IMAGING CLINICAL HISTORY:  Upper extremity swelling COMPARISON: None TECHNIQUE:  Gray scale ultrasound with compression maneuvers where accessible, color and spectral Doppler of the internal jugular, subclavian, axillary and brachial veins was performed bilaterally. Grey scale with and without compression of the basilic and cephalic  veins was also performed bilaterally. Images were obtained and stored in a permanent archive. RESULT: Examination limited by intravenous lines. RIGHT UPPER EXTREMITY DEEP VEINS: Internal Jugular vein: Normal compression, normal spontaneous flow, Subclavian vein:  Normal, spontaneous flow, Axillary vein:  Normal compression, normal spontaneous flow, Brachial vein:  Normal compression, normal spontaneous flow, RIGHT UPPER EXTREMITY SUPERFICIAL VEINS: Basilic vein: Normal compression Cephalic vein:  Normal compression LEFT UPPER EXTREMITY DEEP VEINS: Internal Jugular vein: Normal compression, normal spontaneous flow, Subclavian vein:  Normal, spontaneous flow, Axillary vein:  Normal compression, normal spontaneous flow, Brachial vein:  Normal compression, normal spontaneous flow, LEFT UPPER EXTREMITY SUPERFICIAL VEINS: Basilic vein: Normal compression Cephalic vein:  Normal compression EXAMINATION: BILATERAL LOWER EXTREMITY DEEP VENOUS ULTRASOUND WITH DOPPLER IMAGING CLINICAL HISTORY: SWELLING COMPARISON: None TECHNIQUE:  Gray scale with compression maneuvers, Color Doppler and Spectral Doppler at rest and with augmentation of the distal external iliac, common femoral, femoral and popliteal veins was performed. Grey scale with compression maneuvers of the peroneal and posterior tibial veins was performed. The great and small saphenous veins were imaged in grey scale with compression maneuvers at their insertion to the deep system. Imaging was performed in both lower extremities. Images were obtained and stored in a permanent archive.  RESULT: RIGHT LOWER EXTREMITY PROXIMAL DEEP VEINS Distal External Iliac and Common Femoral Veins:      Compression: Normal      Doppler: Normal, spontaneous respirophasic flow                     Normal response to augmentation  Femoral vein: Compression: Normal      Doppler: Normal, spontaneous flow                     Normal response to augmentation Popliteal vein:      Compression: Normal      Doppler: Normal, spontaneous flow                     Normal response to augmentation CALF DEEP VEINS Peroneal veins: Normal compression Posterior tibial veins: Normal compression Gastrocnemius and Soleal veins: Not imaged SUPERFICIAL VEINS Great saphenous: Patent and compressible at insertion into common femoral vein; not otherwise assessed. Small Saphenous:Patent and compressible in the proximal calf, not otherwise assessed. LEFT LOWER EXTREMITY PROXIMAL DEEP VEINS Distal External Iliac and Common Femoral Veins:      Compression: Normal      Doppler: Normal, spontaneous respirophasic flow                      Normal response to augmentation Femoral vein:      Compression: Normal      Doppler: Normal, spontaneous flow                      Normal response to augmentation  Popliteal vein:      Compression: Normal      Doppler: Normal, spontaneous flow                      Normal response to augmentation CALF DEEP VEINS Peroneal veins: Normal compression Posterior tibial veins: Normal compression Gastrocnemius and Soleal veins: Not imaged SUPERFICIAL VEINS Great saphenous: Patent at insertion into common femoral vein; not otherwise assessed. Small Saphenous: Patent and compressible in the proximal calf, not otherwise assessed. NEGATIVE STUDY FOR ACUTE DVT IN THE RIGHT AND LEFT UPPER EXTREMITIES. NEGATIVE STUDY FOR SUPERFICIAL THROMBOPHLEBITIS IN THE RIGHT AND LEFT  UPPER EXTREMITIES. LIMITED EVALUATION OF THE CALF DUE TO TO PATIENT BODY HABITUS. NEGATIVE STUDY FOR ACUTE PROXIMAL DVT IN THE RIGHT AND LEFT LOWER EXTREMITIES. NEGATIVE STUDY FOR ACUTE CALF DVT IN THE RIGHT AND LEFT LOWER EXTREMITIES. NEGATIVE STUDY FOR SUPERFICIAL THROMBOPHLEBITIS IN THE RIGHT AND LEFT LOWER EXTREMITIES.      Assessment//Plan           Hospital Problems         Last Modified POA COVID-19 8/10/2021 Yes    New onset type 2 diabetes mellitus ([address] 8/10/2021 Yes    Hypoxia 8/10/2021 Yes        Assessment:    Condition: In serious condition. Improving. (New onset type 2 diabetes hypoglycemia improving  Worsened due to COVID-19 and Decadron  Obesity  Hypoxia improving). Plan:   (Advance diet  Continue Lantus 40 units at night Humalog 12 units with each meals plus medium sliding scale patient seen through glass window  Virtual visit due to COVID-19 protocol  Total time spent 25 minutes reviewing labs other notes  Order diabetes education).        Electronically signed by Talia Pa MD on 8/11/21 at 3:16 PM EDT

## 2021-08-12 NOTE — PROGRESS NOTES
Patient a/ox4. Remain on bipap during night, tolerating well. spo2 87-90. More alert and talkative this shift. Prefers high fowlers position, does not tolerate hob being lower. Tolerating po fluids and medications well. Vss.  Shift uneventful

## 2021-08-12 NOTE — PROGRESS NOTES
0700: Assumed care of patient     0800: Assessed and meds given, see MAR and NPR for details. Patient denies pain, tolerating heated high flow well at this time. 0900: Multidisciplinary rounds made at this time. Patient education dropped off pamphlet on pna disease process which patient refuses. States \"I've had pneumonia before. \"    1200: Assess and meds given, see NPR and MAR for details. Patient remains on high flow oxygen, tolerating well. Patient states that he feels better on high flow oxygen as opposed to BiPAP.

## 2021-08-12 NOTE — PROGRESS NOTES
Department of Internal Medicine  General Internal Medicine  Attending Progress Note      SUBJECTIVE:  Pt seen. On high flow nasal cannula.  Much more calm today and in no respiratory distress    OBJECTIVE      Medications    Current Facility-Administered Medications: insulin lispro (HUMALOG) injection vial 0-18 Units, 0-18 Units, Subcutaneous, TID WC  insulin lispro (HUMALOG) injection vial 0-9 Units, 0-9 Units, Subcutaneous, Nightly  dexamethasone (DECADRON) injection 6 mg, 6 mg, Intravenous, BID  docusate sodium (COLACE) capsule 100 mg, 100 mg, Oral, BID  polyethylene glycol (GLYCOLAX) packet 17 g, 17 g, Oral, Daily  insulin glargine (LANTUS) injection vial 40 Units, 40 Units, Subcutaneous, BID  enoxaparin (LOVENOX) injection 30 mg, 30 mg, Subcutaneous, BID  guaiFENesin-codeine (GUAIFENESIN AC) 100-10 MG/5ML liquid 10 mL, 10 mL, Oral, Q4H PRN  insulin lispro (HUMALOG) injection vial 12 Units, 12 Units, Subcutaneous, TID WC  sodium chloride flush 0.9 % injection 5-40 mL, 5-40 mL, Intravenous, 2 times per day  sodium chloride flush 0.9 % injection 5-40 mL, 5-40 mL, Intravenous, PRN  0.9 % sodium chloride infusion, 25 mL, Intravenous, PRN  ondansetron (ZOFRAN-ODT) disintegrating tablet 4 mg, 4 mg, Oral, Q8H PRN **OR** ondansetron (ZOFRAN) injection 4 mg, 4 mg, Intravenous, Q6H PRN  polyethylene glycol (GLYCOLAX) packet 17 g, 17 g, Oral, Daily PRN  acetaminophen (TYLENOL) tablet 650 mg, 650 mg, Oral, Q6H PRN **OR** acetaminophen (TYLENOL) suppository 650 mg, 650 mg, Rectal, Q6H PRN  potassium chloride 10 mEq/100 mL IVPB (Peripheral Line), 10 mEq, Intravenous, PRN  magnesium sulfate 2000 mg in 50 mL IVPB premix, 2,000 mg, Intravenous, PRN  ipratropium-albuterol (DUONEB) nebulizer solution 1 ampule, 1 ampule, Inhalation, Q4H PRN  hydrALAZINE (APRESOLINE) injection 10 mg, 10 mg, Intravenous, Q6H PRN  pantoprazole (PROTONIX) injection 40 mg, 40 mg, Intravenous, Daily **AND** sodium chloride (PF) 0.9 % injection 10 mL, 10 mL, Intravenous, Daily  traZODone (DESYREL) tablet 100 mg, 100 mg, Oral, Nightly  glucose (GLUTOSE) 40 % oral gel 15 g, 15 g, Oral, PRN  dextrose 50 % IV solution, 12.5 g, Intravenous, PRN  glucagon (rDNA) injection 1 mg, 1 mg, Intramuscular, PRN  dextrose 5 % solution, 100 mL/hr, Intravenous, PRN  [COMPLETED] remdesivir 200 mg in sodium chloride 0.9 % 250 mL IVPB, 200 mg, Intravenous, Once **FOLLOWED BY** remdesivir 100 mg in sodium chloride 0.9 % 250 mL IVPB, 100 mg, Intravenous, Q24H  Physical    VITALS:  /83   Pulse 75   Temp 98.2 °F (36.8 °C) (Oral)   Resp 27   Ht 6' (1.829 m)   Wt 254 lb 10.1 oz (115.5 kg)   SpO2 95%   BMI 34.53 kg/m²   Constitutional: Awake and alert with no respiratory distress lying in bed comfortably  Head: Normocephalic, atraumatic  Eyes: EOMI, PERRLA  Neck: neck supple, trachea midline  Lungs: no respiratory distress  Heart: RRR  GI: non-distended  MSK: no edema noted     Data    CBC:   Lab Results   Component Value Date    WBC 7.2 08/12/2021    RBC 4.97 08/12/2021    HGB 14.9 08/12/2021    HCT 44.1 08/12/2021    MCV 88.8 08/12/2021    MCH 30.0 08/12/2021    MCHC 33.7 08/12/2021    RDW 13.3 08/12/2021     08/12/2021     CMP:    Lab Results   Component Value Date     08/12/2021    K 3.7 08/12/2021     08/12/2021    CO2 18 08/12/2021    BUN 24 08/12/2021    CREATININE 0.76 08/12/2021    GFRAA >60.0 08/12/2021    LABGLOM >60.0 08/12/2021    GLUCOSE 280 08/12/2021    PROT 6.8 08/12/2021    LABALBU 3.2 08/12/2021    CALCIUM 9.4 08/12/2021    BILITOT 0.3 08/12/2021    ALKPHOS 105 08/12/2021    AST 40 08/12/2021    ALT 32 08/12/2021       ASSESSMENT AND PLAN      # Acute hypoxic respiratory failure 2/2 COVID-19 pneumonia  - COVID positive  - CXR with bilateral infiltrates  - hypoxic 70s on RA. Placed on NRB by EMS and advanced to BIPAP upon arrival to the ED. Now on high flow nasal cannula. Wean as tolerated  - admit to ICU.  Intensivist consulted  - cont Remdesivir (day 3/5), decadron, lovenox ppx. Sp Actemra x1  - inflammatory markers elevated  - US negative for DVT     # New diagnosis of diabetes  - no previous history  - hyperglycemic on admission. A1C 13.7  - consult endocrinology  - ISS and lantus - monitor closely while on steroids  - diabetic education     DVT: lovenox per COVID protocol     Disposition: Pt admitted to the ICU on BIPAP for severe COVID-19 pneumonia. Wean O2 as tolerated- now on high flow. Intubate if worsening respiratory status. Cont remdesivir, decadron. New diagnosis of diabetes. Endocrinology consulted. Intensivist consulted.       [facility] DO  Internal Medicine

## 2021-08-12 NOTE — PROGRESS NOTES
Physician Progress Note      PATIENT:               Megan Rangel  CSN #:                  [id number]  :                       1965  ADMIT DATE:       8/10/2021 9:13 AM  DISCH DATE:  RESPONDING  PROVIDER #:        Can Valle DO          QUERY TEXT:    Pt admitted with COVID-19 pneumonia and noted to have fever, tachycardia,   tachypnea and elevated CRP and lactic acid with documented Acute respiratory   failure with hypoxia. If possible, please document in progress notes and   discharge summary if you are evaluating and/or treating: The medical record reflects the following:  Risk Factors: COVID pneumonia  Clinical Indicators: .5-160-/103-95% 100% NRB; Lactic acid 2.3,   , CXR: bilateral atelectasis/pneumonia  Treatment: ICU care, ID & Pulmonology consults, IV Decadron, IV Remdesivir, IV   Tocilizumab, Intubation w/MV & O2 protocols, CXR, ABG, labs and monitoring    Thank you,  Fer Cherry RN BSN CDS  Options provided:  -- Sepsis due to COVID-19 pneumonia  -- Covid-19 pneumonia without sepsis  -- Other - I will add my own diagnosis  -- Disagree - Not applicable / Not valid  -- Disagree - Clinically unable to determine / Unknown  -- Refer to Clinical Documentation Reviewer    PROVIDER RESPONSE TEXT:    This patient has Covid-19 pneumonia without sepsis.     Query created by: Dede Singleton on 2021 9:04 AM      Electronically signed by:  Can Valle DO 2021 2:26 PM

## 2021-08-12 NOTE — PROGRESS NOTES
Infectious Disease     Patient Name: Adebayo Mccullough  Date: 8/12/2021  YOB: 1965  Medical Record Number: [id number]      COVID-19 pneumonia        History of Present Illness:  Obesity diabetes(new diagnosis)      Patient presents for a history of shortness of breath steadily worsening admission through the emergency room pulse ox was as low as 70s on room air placed on BiPAP transferred to the intensive care unit COVID-19 positive        On high andria O2        EXAMINATION: XR CHEST PORTABLE       CLINICAL HISTORY: SHORTNESS OF BREATH       COMPARISONS: None available.       FINDINGS: Patient leaning to left. Osseous structures intact. Cardiopericardial silhouette enlarged. Ill-defined areas of increased opacity are found in the right upper and right lower lung, with air bronchogram in the right lower lung. Air bronchogram    increased opacity is also found in the left lower lung, and to lesser degree the left midlung zone.           Impression   BILATERAL ATELECTASIS/PNEUMONIA. CARDIOMEGALY       Ferritin [[id number]] (Abnormal) Collected: 08/10/21 1456      Specimen: Blood Updated: 08/10/21 1545      Ferritin 1,011.0 ng/mL      C-REACTIVE PROTEIN [[id number]] (Abnormal) Collected: 08/10/21 1456     Specimen: Blood Updated: 08/10/21 1532      .0 mg/L              Review of Systems   Constitutional: Negative for chills, diaphoresis, fatigue and fever. Respiratory: Positive for cough and shortness of breath. Cardiovascular: Negative for chest pain. Gastrointestinal: Negative for abdominal distention, abdominal pain, diarrhea, nausea and vomiting. Musculoskeletal: Negative for arthralgias. Physical Exam  Constitutional:       General: He is in acute distress. Appearance: He is ill-appearing. Cardiovascular:      Heart sounds: Normal heart sounds. No murmur heard. Pulmonary:      Effort: Pulmonary effort is normal.      Breath sounds: No wheezing, rhonchi or rales. Abdominal:      General: Abdomen is flat. There is no distension. Palpations: There is no mass. Tenderness: There is no abdominal tenderness. There is no right CVA tenderness, left CVA tenderness, guarding or rebound. Hernia: No hernia is present. Blood pressure 120/68, pulse 71, temperature 98.2 °F (36.8 °C), temperature source Oral, resp. rate 28, height 6' (1.829 m), weight 254 lb 10.1 oz (115.5 kg), SpO2 91 %.       .   Lab Results   Component Value Date    WBC 7.2 08/12/2021    HGB 14.9 08/12/2021    HCT 44.1 08/12/2021    MCV 88.8 08/12/2021     08/12/2021     Lab Results   Component Value Date     08/12/2021    K 3.7 08/12/2021     08/12/2021    CO2 18 08/12/2021    BUN 24 08/12/2021    CREATININE 0.76 08/12/2021    GLUCOSE 280 08/12/2021    CALCIUM 9.4 08/12/2021            PLAN:    COVID-19 pneumonia  New diagnosis of diabetes    No fevers  Off BiPAP now on high flow O2          On dexamethasone Lovenox remdesivir  Given 1 dose of Actemra    No evidence of secondary bacterial pneumonia

## 2021-08-12 NOTE — CARE COORDINATION
Pneumonia and Covid-19 information delivered to patient by primary RN due to isolation.   Electronically signed by Keya Quesada RN on 8/12/2021 at 10:46 AM

## 2021-08-13 NOTE — PROGRESS NOTES
1230 Assumed care of patient, no changes to previous assessment. Administered insulin as ordered, brought lunch tray in room. Patient denies other needs.

## 2021-08-13 NOTE — PROGRESS NOTES
Spoke with patient on phone. Adebayo Mccullough,  Seen for evaluation and education on Type 2 uncontrolled newly diagnosed    Lab Results   Component Value Date    LABA1C 13.7 (H) 08/10/2021     No results found for: EAG    Patient is on insulin and advised to have ICU nurses teach how to give if going home on insulin. Pt states he gave shots to his mother and is familiar. Briefly discussed role of diet, physical activity, daily use of medications and self-monitoring for good diabetes control, provided diabetes education survival packet  (to nurse Eleni Marte who gave to patient). Pt states he has a meter at home. Provided patient with card and contact information for out-patient Diabetes education services. Would recommend follow -up education at outpatient diabetes education at [facility].  An order has been placed      Nieves Anderson RN

## 2021-08-13 NOTE — PROGRESS NOTES
injection 10 mL, 10 mL, Intravenous, Daily  traZODone (DESYREL) tablet 100 mg, 100 mg, Oral, Nightly  glucose (GLUTOSE) 40 % oral gel 15 g, 15 g, Oral, PRN  dextrose 50 % IV solution, 12.5 g, Intravenous, PRN  glucagon (rDNA) injection 1 mg, 1 mg, Intramuscular, PRN  dextrose 5 % solution, 100 mL/hr, Intravenous, PRN  [COMPLETED] remdesivir 200 mg in sodium chloride 0.9 % 250 mL IVPB, 200 mg, Intravenous, Once **FOLLOWED BY** remdesivir 100 mg in sodium chloride 0.9 % 250 mL IVPB, 100 mg, Intravenous, Q24H  Physical    VITALS:  /66   Pulse 79   Temp 98.2 °F (36.8 °C) (Oral)   Resp 14   Ht 6' (1.829 m)   Wt 254 lb 10.1 oz (115.5 kg)   SpO2 95%   BMI 34.53 kg/m²   Constitutional: Awake and alert with no respiratory distress lying in bed comfortably  Head: Normocephalic, atraumatic  Eyes: EOMI, PERRLA  Neck: neck supple, trachea midline  Lungs: no respiratory distress  Heart: RRR  GI: non-distended  MSK: no edema noted     Data    CBC:   Lab Results   Component Value Date    WBC 5.2 08/13/2021    RBC 5.01 08/13/2021    HGB 14.8 08/13/2021    HCT 44.4 08/13/2021    MCV 88.6 08/13/2021    MCH 29.6 08/13/2021    MCHC 33.4 08/13/2021    RDW 13.2 08/13/2021     08/13/2021     CMP:    Lab Results   Component Value Date     08/13/2021    K 4.0 08/13/2021    CL 99 08/13/2021    CO2 25 08/13/2021    BUN 21 08/13/2021    CREATININE 0.81 08/13/2021    GFRAA >60.0 08/13/2021    LABGLOM >60.0 08/13/2021    GLUCOSE 211 08/13/2021    PROT 6.0 08/13/2021    LABALBU 2.9 08/13/2021    CALCIUM 8.4 08/13/2021    BILITOT 0.4 08/13/2021    ALKPHOS 107 08/13/2021    AST 34 08/13/2021    ALT 26 08/13/2021       ASSESSMENT AND PLAN      # Acute hypoxic respiratory failure 2/2 COVID-19 pneumonia  - COVID positive  - CXR with bilateral infiltrates  - hypoxic 70s on RA. Placed on NRB by EMS and advanced to BIPAP upon arrival to the ED. Now on high flow nasal cannula. Wean as tolerated  - admit to ICU.  Intensivist consulted  - cont Remdesivir (day 4/5), decadron, lovenox ppx. Sp Actemra x1  - inflammatory markers elevated  - US negative for DVT     # New diagnosis of diabetes  - no previous history  - hyperglycemic on admission. A1C 13.7  - consult endocrinology  - ISS and lantus - monitor closely while on steroids  - diabetic education     DVT: lovenox per COVID protocol     Disposition: Pt admitted to the ICU on BIPAP for severe COVID-19 pneumonia. Wean O2 as tolerated- now on high flow. Intubate if worsening respiratory status. Cont remdesivir, decadron. New diagnosis of diabetes. Endocrinology consulted. Intensivist consulted.       Ryan Ponce DO  Internal Medicine

## 2021-08-13 NOTE — PROGRESS NOTES
appropriate    Medications:  Scheduled Meds:   lactulose  20 g Oral TID    insulin lispro  0-18 Units Subcutaneous TID WC    insulin lispro  0-9 Units Subcutaneous Nightly    dexamethasone  6 mg Intravenous BID    docusate sodium  100 mg Oral BID    polyethylene glycol  17 g Oral Daily    insulin glargine  40 Units Subcutaneous BID    enoxaparin  30 mg Subcutaneous BID    insulin lispro  12 Units Subcutaneous TID WC    sodium chloride flush  5-40 mL Intravenous 2 times per day    pantoprazole  40 mg Intravenous Daily    And    sodium chloride (PF)  10 mL Intravenous Daily    traZODone  100 mg Oral Nightly    remdesivir IVPB  100 mg Intravenous Q24H       PRN Meds:  guaiFENesin-codeine, sodium chloride flush, sodium chloride, ondansetron **OR** ondansetron, polyethylene glycol, acetaminophen **OR** acetaminophen, potassium chloride, magnesium sulfate, ipratropium-albuterol, hydrALAZINE, glucose, dextrose, glucagon (rDNA), dextrose    Results: reviewed by me   CBC:   Recent Labs     08/11/21 0539 08/12/21 0525 08/13/21 0458   WBC 5.2 7.2 5.2   HGB 14.4 14.9 14.8   HCT 43.2 44.1 44.4   MCV 89.8 88.8 88.6    272 285     BMP:   Recent Labs     08/11/21 0539 08/12/21 0525 08/13/21 0458    135 137   K 3.9 3.7 4.0    100 99   CO2 19* 18* 25   BUN 22* 24* 21*   CREATININE 0.98 0.76 0.81     LIVER PROFILE:   Recent Labs     08/11/21 0539 08/12/21 0525 08/13/21  0458   AST 35 40 34   ALT 27 32 26   BILITOT 0.3 0.3 0.4   ALKPHOS 106* 105* 107*     PT/INR: No results for input(s): PROTIME, INR in the last 72 hours. APTT: No results for input(s): APTT in the last 72 hours. UA:No results for input(s): NITRITE, COLORU, PHUR, LABCAST, WBCUA, RBCUA, MUCUS, TRICHOMONAS, YEAST, BACTERIA, CLARITYU, SPECGRAV, LEUKOCYTESUR, UROBILINOGEN, BILIRUBINUR, BLOODU, GLUCOSEU, AMORPHOUS in the last 72 hours.     Invalid input(s): KETONESU    Cultures:  Negative so far  Films:  CXR reviewed by me and it showed lateral groundglass infiltrates and consolidations      Assessment: This is a critically ill patient at risk of deterioration / death , needing close ICU monitoring and intervention due to below noted problems   · Severe acute hypoxic respiratory failure  · Severe COVID-19 infection   · Hyperglycemia  · Hypercoagulable state    Recommendations  · Continue O2 via high flow nasal cannula target sat 93 to 95%  · BiPAP as needed and while asleep  · Continue remdesivir complete 5 days of treatment  · Increase dexamethasone to twice daily  · Target blood sugar 140-180, appreciate endocrinology  · Monitor urine output and renal function  · DVT prophylaxis  · Continue Colace and MiraLAX  · Lactulose x2 doses today  · Decrease Robitussin and codeine dose today and monitor  · Consider Precedex if needed  · Encouraged to do prone position  · Add MetaNeb  · Watch closely, intubate if worsening respiratory status or hemodynamics    Due to the immediate potential for life-threatening deterioration due to acute hypoxic respiratory failure I spent 35  minutes providing critical care.  This time is excluding time spent performing procedures.           Electronically signed by Kathy Escobedo MD,  [facility] ,on 8/13/2021 at 10:03 AM

## 2021-08-13 NOTE — PROGRESS NOTES
Progress Note  Date:2021       Room:[place] [address]  Patient Nuris Corbett     YOB: 1965     Age:56 y.o. Chief complaint new onset type 2 diabetes    Subjective    Subjective:  Symptoms:  Stable. He reports shortness of breath and weakness. Diet:  Adequate intake. Activity level: Impaired due to weakness. Review of Systems   Constitutional: Positive for fatigue. Respiratory: Positive for shortness of breath. Neurological: Positive for weakness. All other systems reviewed and are negative. Objective         Vitals Last 24 Hours:  TEMPERATURE:  Temp  Av °F (36.7 °C)  Min: 97.8 °F (36.6 °C)  Max: 98.2 °F (36.8 °C)  RESPIRATIONS RANGE: Resp  Av.4  Min: 0  Max: 42  PULSE OXIMETRY RANGE: SpO2  Av.2 %  Min: 76 %  Max: 97 %  PULSE RANGE: Pulse  Av.8  Min: 53  Max: 99  BLOOD PRESSURE RANGE: Systolic (79CIB), XLR:371 , Min:112 , VMP:503   ; Diastolic (02OSB), HGK:74, Min:66, Max:89    I/O (24Hr): Intake/Output Summary (Last 24 hours) at 2021 2130  Last data filed at 2021 1100  Gross per 24 hour   Intake 160 ml   Output 1050 ml   Net -890 ml     Objective:  General Appearance:  Ill-appearing. Vital signs: (most recent): Blood pressure 115/77, pulse 74, temperature 97.8 °F (36.6 °C), temperature source Axillary, resp. rate 22, height 6' (1.829 m), weight 254 lb 10.1 oz (115.5 kg), SpO2 93 %. Vital signs are normal.      Labs/Imaging/Diagnostics    Labs:  CBC:  Recent Labs     08/10/21  0915 08/10/21  0924 08/10/21  1008 21  0539 21  0525   WBC 7.6  --   --  5.2 7.2   RBC 4.82  --   --  4.81 4.97   HGB 14.5   < > 15.0 14.4 14.9   HCT 43.2  --   --  43.2 44.1   MCV 89.7  --   --  89.8 88.8   RDW 12.9  --   --  13.1 13.3     --   --  250 272    < > = values in this interval not displayed.      CHEMISTRIES:  Recent Labs     08/10/21  0915 08/10/21  0924 08/10/21  1456 21  0539 21  0525   *   < > 131* 135 135   K

## 2021-08-13 NOTE — PROGRESS NOTES
Nutrition Education    · Consult recieved for DM diet education,pt currently in [facility] precaustions for COVID, will follow up for education needs, once patient is out of isolation    Electronically signed by Lourdes Cadena RD, LD on 8/13/21 at 9:03 AM EDT

## 2021-08-13 NOTE — PROGRESS NOTES
Patient desaturated to 81% on HFNC at 60L 95%. Placed back on Bipap. Increased to 100%. O2 saturations 91%. No distress noted.

## 2021-08-13 NOTE — CARE COORDINATION
PATIENT STILL ON HF 02. PATIENT CONTINUED ON REMDES AND IV STEROIDS. PATIENT IS A NEW DM SO WILL NEED TO MAKE SURE PATIENT IS SEEN BY DM ED PRIOR TO DC. MONITOR PATIENT AT D/C FOR NEEDS. CM TO FOLLOW.

## 2021-08-13 NOTE — PROGRESS NOTES
Infectious Disease     Patient Name: Gustavo Yanes  Date: 8/13/2021  YOB: 1965  Medical Record Number: [id number]      COVID-19 pneumonia        History of Present Illness:  Obesity diabetes(new diagnosis)      Patient presents for a history of shortness of breath steadily worsening admission through the emergency room pulse ox was as low as 70s on room air placed on BiPAP transferred to the intensive care unit COVID-19 positive        On high andria O2        EXAMINATION: XR CHEST PORTABLE       CLINICAL HISTORY: SHORTNESS OF BREATH       COMPARISONS: None available.       FINDINGS: Patient leaning to left. Osseous structures intact. Cardiopericardial silhouette enlarged. Ill-defined areas of increased opacity are found in the right upper and right lower lung, with air bronchogram in the right lower lung. Air bronchogram    increased opacity is also found in the left lower lung, and to lesser degree the left midlung zone.           Impression   BILATERAL ATELECTASIS/PNEUMONIA. CARDIOMEGALY       Ferritin [[id number]] (Abnormal) Collected: 08/10/21 1456      Specimen: Blood Updated: 08/10/21 1545      Ferritin 1,011.0 ng/mL      C-REACTIVE PROTEIN [[id number]] (Abnormal) Collected: 08/10/21 1456     Specimen: Blood Updated: 08/10/21 1532      .0 mg/L              Review of Systems   Respiratory: Positive for cough and shortness of breath. Cardiovascular: Negative for chest pain. Gastrointestinal: Negative for abdominal distention, abdominal pain, diarrhea, nausea and vomiting. Physical Exam  Constitutional:       General: He is in acute distress. Appearance: He is ill-appearing. Cardiovascular:      Heart sounds: Normal heart sounds. No murmur heard. Pulmonary:      Effort: Pulmonary effort is normal.      Breath sounds: No wheezing, rhonchi or rales. Abdominal:      General: Abdomen is flat. There is no distension. Palpations: There is no mass. Tenderness:  There is no abdominal tenderness. There is no right CVA tenderness, left CVA tenderness, guarding or rebound. Hernia: No hernia is present. Blood pressure 134/71, pulse 69, temperature 98.2 °F (36.8 °C), temperature source Oral, resp. rate (!) 37, height 6' (1.829 m), weight 254 lb 10.1 oz (115.5 kg), SpO2 (!) 87 %.       .   Lab Results   Component Value Date    WBC 5.2 08/13/2021    HGB 14.8 08/13/2021    HCT 44.4 08/13/2021    MCV 88.6 08/13/2021     08/13/2021     Lab Results   Component Value Date     08/13/2021    K 4.0 08/13/2021    CL 99 08/13/2021    CO2 25 08/13/2021    BUN 21 08/13/2021    CREATININE 0.81 08/13/2021    GLUCOSE 211 08/13/2021    CALCIUM 8.4 08/13/2021            PLAN:    COVID-19 pneumonia  New diagnosis of diabetes    No fevers  Off BiPAP now on high flow O2      On dexamethasone Lovenox remdesivir  Given 1 dose of Actemra    No evidence of secondary bacterial pneumonia

## 2021-08-14 NOTE — CARE COORDINATION
REMDESIVIR COMPLETED TODAY. PATIENT REMAINS 75% FIO2  AND 60 L. PATIENT MAY NEED ALTERNATE DC PLANS SUCH AS SNF/[facility] IF UNABLE TO WEAN 02. LSW/CM TO FOLLOW.

## 2021-08-14 NOTE — PROGRESS NOTES
covid positive patient on 60L 90% high flow - wore bipap last night. Able to communicate and not have dyspnea during conversation. Patient had large BM on bedside and commode refused to use bedpan- POX dropped into 70% but patient recovered quickly once off of commode and back in bed with POX of 97%.    1200- patient down to 80% on high flow NC.   1500- patient down to 75%

## 2021-08-14 NOTE — PROGRESS NOTES
Safety Tray (Disposables)    IV:    sodium chloride      dextrose         Medications:  Scheduled Meds:   lactulose  20 g Oral BID    insulin glargine  25 Units Subcutaneous BID    insulin lispro  8 Units Subcutaneous TID WC    insulin lispro  0-18 Units Subcutaneous TID WC    insulin lispro  0-9 Units Subcutaneous Nightly    dexamethasone  6 mg Intravenous BID    docusate sodium  100 mg Oral BID    polyethylene glycol  17 g Oral Daily    enoxaparin  30 mg Subcutaneous BID    sodium chloride flush  5-40 mL Intravenous 2 times per day    pantoprazole  40 mg Intravenous Daily    And    sodium chloride (PF)  10 mL Intravenous Daily    traZODone  100 mg Oral Nightly    remdesivir IVPB  100 mg Intravenous Q24H       PRN Meds:  guaiFENesin-codeine, sodium chloride flush, sodium chloride, ondansetron **OR** ondansetron, polyethylene glycol, acetaminophen **OR** acetaminophen, potassium chloride, magnesium sulfate, ipratropium-albuterol, hydrALAZINE, glucose, dextrose, glucagon (rDNA), dextrose        Radiology      XR CHEST PORTABLE    Result Date: 8/10/2021  EXAMINATION: XR CHEST PORTABLE CLINICAL HISTORY: SHORTNESS OF BREATH COMPARISONS: None available. FINDINGS: Patient leaning to left. Osseous structures intact. Cardiopericardial silhouette enlarged. Ill-defined areas of increased opacity are found in the right upper and right lower lung, with air bronchogram in the right lower lung. Air bronchogram increased opacity is also found in the left lower lung, and to lesser degree the left midlung zone. BILATERAL ATELECTASIS/PNEUMONIA.  CARDIOMEGALY    US DUP UPPER EXTREMITY RIGHT VENOUS    Result Date: 8/10/2021  EXAMINATION: BILATERAL UPPER EXTREMITY DEEP VENOUS ULTRASOUND WITH DOPPLER IMAGING CLINICAL HISTORY:  Upper extremity swelling COMPARISON: None TECHNIQUE:  Jefferson Person scale ultrasound with compression maneuvers where accessible, color and spectral Doppler of the internal jugular, subclavian, axillary and brachial veins was performed bilaterally. Grey scale with and without compression of the basilic and cephalic  veins was also performed bilaterally. Images were obtained and stored in a permanent archive. RESULT: Examination limited by intravenous lines. RIGHT UPPER EXTREMITY DEEP VEINS: Internal Jugular vein: Normal compression, normal spontaneous flow, Subclavian vein:  Normal, spontaneous flow, Axillary vein:  Normal compression, normal spontaneous flow, Brachial vein:  Normal compression, normal spontaneous flow, RIGHT UPPER EXTREMITY SUPERFICIAL VEINS: Basilic vein: Normal compression Cephalic vein:  Normal compression LEFT UPPER EXTREMITY DEEP VEINS: Internal Jugular vein: Normal compression, normal spontaneous flow, Subclavian vein:  Normal, spontaneous flow, Axillary vein:  Normal compression, normal spontaneous flow, Brachial vein:  Normal compression, normal spontaneous flow, LEFT UPPER EXTREMITY SUPERFICIAL VEINS: Basilic vein: Normal compression Cephalic vein:  Normal compression EXAMINATION: BILATERAL LOWER EXTREMITY DEEP VENOUS ULTRASOUND WITH DOPPLER IMAGING CLINICAL HISTORY: SWELLING COMPARISON: None TECHNIQUE:  Gray scale with compression maneuvers, Color Doppler and Spectral Doppler at rest and with augmentation of the distal external iliac, common femoral, femoral and popliteal veins was performed. Grey scale with compression maneuvers of the peroneal and posterior tibial veins was performed. The great and small saphenous veins were imaged in grey scale with compression maneuvers at their insertion to the deep system. Imaging was performed in both lower extremities. Images were obtained and stored in a permanent archive.  RESULT: RIGHT LOWER EXTREMITY PROXIMAL DEEP VEINS Distal External Iliac and Common Femoral Veins:      Compression: Normal      Doppler: Normal, spontaneous respirophasic flow                     Normal response to augmentation  Femoral vein:      Compression: Normal Doppler: Normal, spontaneous flow                     Normal response to augmentation Popliteal vein:      Compression: Normal      Doppler: Normal, spontaneous flow                     Normal response to augmentation CALF DEEP VEINS Peroneal veins: Normal compression Posterior tibial veins: Normal compression Gastrocnemius and Soleal veins: Not imaged SUPERFICIAL VEINS Great saphenous: Patent and compressible at insertion into common femoral vein; not otherwise assessed. Small Saphenous:Patent and compressible in the proximal calf, not otherwise assessed. LEFT LOWER EXTREMITY PROXIMAL DEEP VEINS Distal External Iliac and Common Femoral Veins:      Compression: Normal      Doppler: Normal, spontaneous respirophasic flow                      Normal response to augmentation Femoral vein:      Compression: Normal      Doppler: Normal, spontaneous flow                      Normal response to augmentation  Popliteal vein:      Compression: Normal      Doppler: Normal, spontaneous flow                      Normal response to augmentation CALF DEEP VEINS Peroneal veins: Normal compression Posterior tibial veins: Normal compression Gastrocnemius and Soleal veins: Not imaged SUPERFICIAL VEINS Great saphenous: Patent at insertion into common femoral vein; not otherwise assessed. Small Saphenous: Patent and compressible in the proximal calf, not otherwise assessed. NEGATIVE STUDY FOR ACUTE DVT IN THE RIGHT AND LEFT UPPER EXTREMITIES. NEGATIVE STUDY FOR SUPERFICIAL THROMBOPHLEBITIS IN THE RIGHT AND LEFT  UPPER EXTREMITIES. LIMITED EVALUATION OF THE CALF DUE TO TO PATIENT BODY HABITUS. NEGATIVE STUDY FOR ACUTE PROXIMAL DVT IN THE RIGHT AND LEFT LOWER EXTREMITIES. NEGATIVE STUDY FOR ACUTE CALF DVT IN THE RIGHT AND LEFT LOWER EXTREMITIES. NEGATIVE STUDY FOR SUPERFICIAL THROMBOPHLEBITIS IN THE RIGHT AND LEFT LOWER EXTREMITIES.      US DUP UPPER EXTREMITY LEFT VENOUS    Result Date: 8/10/2021  EXAMINATION: BILATERAL UPPER EXTREMITY DEEP VENOUS ULTRASOUND WITH DOPPLER IMAGING CLINICAL HISTORY:  Upper extremity swelling COMPARISON: None TECHNIQUE:  Natalia Pucker scale ultrasound with compression maneuvers where accessible, color and spectral Doppler of the internal jugular, subclavian, axillary and brachial veins was performed bilaterally. Grey scale with and without compression of the basilic and cephalic  veins was also performed bilaterally. Images were obtained and stored in a permanent archive. RESULT: Examination limited by intravenous lines. RIGHT UPPER EXTREMITY DEEP VEINS: Internal Jugular vein: Normal compression, normal spontaneous flow, Subclavian vein:  Normal, spontaneous flow, Axillary vein:  Normal compression, normal spontaneous flow, Brachial vein:  Normal compression, normal spontaneous flow, RIGHT UPPER EXTREMITY SUPERFICIAL VEINS: Basilic vein: Normal compression Cephalic vein:  Normal compression LEFT UPPER EXTREMITY DEEP VEINS: Internal Jugular vein: Normal compression, normal spontaneous flow, Subclavian vein:  Normal, spontaneous flow, Axillary vein:  Normal compression, normal spontaneous flow, Brachial vein:  Normal compression, normal spontaneous flow, LEFT UPPER EXTREMITY SUPERFICIAL VEINS: Basilic vein: Normal compression Cephalic vein:  Normal compression EXAMINATION: BILATERAL LOWER EXTREMITY DEEP VENOUS ULTRASOUND WITH DOPPLER IMAGING CLINICAL HISTORY: SWELLING COMPARISON: None TECHNIQUE:  Gray scale with compression maneuvers, Color Doppler and Spectral Doppler at rest and with augmentation of the distal external iliac, common femoral, femoral and popliteal veins was performed. Grey scale with compression maneuvers of the peroneal and posterior tibial veins was performed. The great and small saphenous veins were imaged in grey scale with compression maneuvers at their insertion to the deep system. Imaging was performed in both lower extremities. Images were obtained and stored in a permanent archive.  RESULT: RIGHT LOWER EXTREMITY PROXIMAL DEEP VEINS Distal External Iliac and Common Femoral Veins:      Compression: Normal      Doppler: Normal, spontaneous respirophasic flow                     Normal response to augmentation  Femoral vein:      Compression: Normal      Doppler: Normal, spontaneous flow                     Normal response to augmentation Popliteal vein:      Compression: Normal      Doppler: Normal, spontaneous flow                     Normal response to augmentation CALF DEEP VEINS Peroneal veins: Normal compression Posterior tibial veins: Normal compression Gastrocnemius and Soleal veins: Not imaged SUPERFICIAL VEINS Great saphenous: Patent and compressible at insertion into common femoral vein; not otherwise assessed. Small Saphenous:Patent and compressible in the proximal calf, not otherwise assessed. LEFT LOWER EXTREMITY PROXIMAL DEEP VEINS Distal External Iliac and Common Femoral Veins:      Compression: Normal      Doppler: Normal, spontaneous respirophasic flow                      Normal response to augmentation Femoral vein:      Compression: Normal      Doppler: Normal, spontaneous flow                      Normal response to augmentation  Popliteal vein:      Compression: Normal      Doppler: Normal, spontaneous flow                      Normal response to augmentation CALF DEEP VEINS Peroneal veins: Normal compression Posterior tibial veins: Normal compression Gastrocnemius and Soleal veins: Not imaged SUPERFICIAL VEINS Great saphenous: Patent at insertion into common femoral vein; not otherwise assessed. Small Saphenous: Patent and compressible in the proximal calf, not otherwise assessed. NEGATIVE STUDY FOR ACUTE DVT IN THE RIGHT AND LEFT UPPER EXTREMITIES. NEGATIVE STUDY FOR SUPERFICIAL THROMBOPHLEBITIS IN THE RIGHT AND LEFT  UPPER EXTREMITIES. LIMITED EVALUATION OF THE CALF DUE TO TO PATIENT BODY HABITUS. NEGATIVE STUDY FOR ACUTE PROXIMAL DVT IN THE RIGHT AND LEFT LOWER EXTREMITIES. tibial veins was performed. The great and small saphenous veins were imaged in grey scale with compression maneuvers at their insertion to the deep system. Imaging was performed in both lower extremities. Images were obtained and stored in a permanent archive. RESULT: RIGHT LOWER EXTREMITY PROXIMAL DEEP VEINS Distal External Iliac and Common Femoral Veins:      Compression: Normal      Doppler: Normal, spontaneous respirophasic flow                     Normal response to augmentation  Femoral vein:      Compression: Normal      Doppler: Normal, spontaneous flow                     Normal response to augmentation Popliteal vein:      Compression: Normal      Doppler: Normal, spontaneous flow                     Normal response to augmentation CALF DEEP VEINS Peroneal veins: Normal compression Posterior tibial veins: Normal compression Gastrocnemius and Soleal veins: Not imaged SUPERFICIAL VEINS Great saphenous: Patent and compressible at insertion into common femoral vein; not otherwise assessed. Small Saphenous:Patent and compressible in the proximal calf, not otherwise assessed. LEFT LOWER EXTREMITY PROXIMAL DEEP VEINS Distal External Iliac and Common Femoral Veins:      Compression: Normal      Doppler: Normal, spontaneous respirophasic flow                      Normal response to augmentation Femoral vein:      Compression: Normal      Doppler: Normal, spontaneous flow                      Normal response to augmentation  Popliteal vein:      Compression: Normal      Doppler: Normal, spontaneous flow                      Normal response to augmentation CALF DEEP VEINS Peroneal veins: Normal compression Posterior tibial veins: Normal compression Gastrocnemius and Soleal veins: Not imaged SUPERFICIAL VEINS Great saphenous: Patent at insertion into common femoral vein; not otherwise assessed. Small Saphenous: Patent and compressible in the proximal calf, not otherwise assessed.      NEGATIVE STUDY FOR ACUTE DVT IN THE RIGHT AND LEFT UPPER EXTREMITIES. NEGATIVE STUDY FOR SUPERFICIAL THROMBOPHLEBITIS IN THE RIGHT AND LEFT  UPPER EXTREMITIES. LIMITED EVALUATION OF THE CALF DUE TO TO PATIENT BODY HABITUS. NEGATIVE STUDY FOR ACUTE PROXIMAL DVT IN THE RIGHT AND LEFT LOWER EXTREMITIES. NEGATIVE STUDY FOR ACUTE CALF DVT IN THE RIGHT AND LEFT LOWER EXTREMITIES. NEGATIVE STUDY FOR SUPERFICIAL THROMBOPHLEBITIS IN THE RIGHT AND LEFT LOWER EXTREMITIES. Results:  CBC:   Recent Labs     08/12/21 0525 08/13/21 0458 08/14/21 0628   WBC 7.2 5.2 7.3   HGB 14.9 14.8 14.3   HCT 44.1 44.4 43.3   MCV 88.8 88.6 88.0    285 325     :   Recent Labs     08/12/21 0525 08/13/21 0458 08/14/21 0628    137 139   K 3.7 4.0 3.6    99 101   CO2 18* 25 25   BUN 24* 21* 20   CREATININE 0.76 0.81 0.87     LIVER PROFILE:   Recent Labs     08/12/21 0525 08/13/21 0458 08/14/21 0628   AST 40 34 28   ALT 32 26 26   BILITOT 0.3 0.4 0.4   ALKPHOS 105* 107* 109*       Assessment: This is a critically ill patient at risk of deterioration / death , needing close ICU monitoring and intervention due to below noted problems      1. Severe COVID-19 infection  2. Acute respiratory failure with hypoxia secondary to above. 3.  Hyperglycemia secondary to steroid  4. Hypercoagulable state secondary to COVID-19 infection    Suggestion:  Continue O2 via high flow nasal cannula. Keep saturation 92 to 95%. Use BiPAP as tolerated during daytime and sleep. Continue remdesivir 100 mg IV every 24 hourly. Dexamethasone 6 mg IV twice daily. DVT prophylaxis with subcu Lovenox 30 mg twice a day. Encourage prone position. MetaNeb. Albuterol and Atrovent every 4 hours as needed. Watch closely in ICU intubation if respiratory status worsens. Chest x-ray on 8/10/2021 showing bilateral atelectasis/pneumonia cardiomegaly.   Critical care time spent reviewing labs/films, examining patient, collaborating with otherphysicians but excluding procedures for life threatening organ failure is 34 minutes.       SIGNATURE: Daune Bloch, MD, [facility]

## 2021-08-14 NOTE — PROGRESS NOTES
Department of Internal Medicine  General Internal Medicine  Attending Progress Note      SUBJECTIVE:  Pt seen through ICU door to reduce exposure to COVID-19. On high flow nasal cannula.  No distress, asleep    OBJECTIVE      Medications    Current Facility-Administered Medications: guaiFENesin-codeine (GUAIFENESIN AC) 100-10 MG/5ML liquid 5 mL, 5 mL, Oral, Q4H PRN  lactulose (CHRONULAC) 10 GM/15ML solution 20 g, 20 g, Oral, BID  insulin glargine (LANTUS) injection vial 25 Units, 25 Units, Subcutaneous, BID  insulin lispro (HUMALOG) injection vial 8 Units, 8 Units, Subcutaneous, TID WC  insulin lispro (HUMALOG) injection vial 0-18 Units, 0-18 Units, Subcutaneous, TID WC  insulin lispro (HUMALOG) injection vial 0-9 Units, 0-9 Units, Subcutaneous, Nightly  dexamethasone (DECADRON) injection 6 mg, 6 mg, Intravenous, BID  docusate sodium (COLACE) capsule 100 mg, 100 mg, Oral, BID  polyethylene glycol (GLYCOLAX) packet 17 g, 17 g, Oral, Daily  enoxaparin (LOVENOX) injection 30 mg, 30 mg, Subcutaneous, BID  sodium chloride flush 0.9 % injection 5-40 mL, 5-40 mL, Intravenous, 2 times per day  sodium chloride flush 0.9 % injection 5-40 mL, 5-40 mL, Intravenous, PRN  0.9 % sodium chloride infusion, 25 mL, Intravenous, PRN  ondansetron (ZOFRAN-ODT) disintegrating tablet 4 mg, 4 mg, Oral, Q8H PRN **OR** ondansetron (ZOFRAN) injection 4 mg, 4 mg, Intravenous, Q6H PRN  polyethylene glycol (GLYCOLAX) packet 17 g, 17 g, Oral, Daily PRN  acetaminophen (TYLENOL) tablet 650 mg, 650 mg, Oral, Q6H PRN **OR** acetaminophen (TYLENOL) suppository 650 mg, 650 mg, Rectal, Q6H PRN  potassium chloride 10 mEq/100 mL IVPB (Peripheral Line), 10 mEq, Intravenous, PRN  magnesium sulfate 2000 mg in 50 mL IVPB premix, 2,000 mg, Intravenous, PRN  ipratropium-albuterol (DUONEB) nebulizer solution 1 ampule, 1 ampule, Inhalation, Q4H PRN  hydrALAZINE (APRESOLINE) injection 10 mg, 10 mg, Intravenous, Q6H PRN  pantoprazole (PROTONIX) injection 40 mg, 40 mg, Intravenous, Daily **AND** sodium chloride (PF) 0.9 % injection 10 mL, 10 mL, Intravenous, Daily  traZODone (DESYREL) tablet 100 mg, 100 mg, Oral, Nightly  glucose (GLUTOSE) 40 % oral gel 15 g, 15 g, Oral, PRN  dextrose 50 % IV solution, 12.5 g, Intravenous, PRN  glucagon (rDNA) injection 1 mg, 1 mg, Intramuscular, PRN  dextrose 5 % solution, 100 mL/hr, Intravenous, PRN  Physical    VITALS:  /80   Pulse 70   Temp 98.6 °F (37 °C) (Oral)   Resp 22   Ht 6' (1.829 m)   Wt 254 lb 10.1 oz (115.5 kg)   SpO2 95%   BMI 34.53 kg/m²   Constitutional: Awake and alert with no respiratory distress lying in bed comfortably  Head: Normocephalic, atraumatic  Eyes: EOMI, PERRLA  Neck: neck supple, trachea midline  Lungs: no respiratory distress  Heart: RRR  GI: non-distended  MSK: no edema noted     Data    CBC:   Lab Results   Component Value Date    WBC 7.3 08/14/2021    RBC 4.93 08/14/2021    HGB 14.3 08/14/2021    HCT 43.3 08/14/2021    MCV 88.0 08/14/2021    MCH 29.1 08/14/2021    MCHC 33.1 08/14/2021    RDW 13.2 08/14/2021     08/14/2021     CMP:    Lab Results   Component Value Date     08/14/2021    K 3.6 08/14/2021     08/14/2021    CO2 25 08/14/2021    BUN 20 08/14/2021    CREATININE 0.87 08/14/2021    GFRAA >60.0 08/14/2021    LABGLOM >60.0 08/14/2021    GLUCOSE 251 08/14/2021    PROT 6.1 08/14/2021    LABALBU 3.1 08/14/2021    CALCIUM 8.5 08/14/2021    BILITOT 0.4 08/14/2021    ALKPHOS 109 08/14/2021    AST 28 08/14/2021    ALT 26 08/14/2021       ASSESSMENT AND PLAN      # Acute hypoxic respiratory failure 2/2 COVID-19 pneumonia  - COVID positive  - CXR with bilateral infiltrates  - hypoxic 70s on RA. Placed on NRB by EMS and advanced to BIPAP upon arrival to the ED. Now on high flow nasal cannula. Wean as tolerated  - admit to ICU. Intensivist consulted  - cont Remdesivir (day 5/5), decadron, lovenox ppx.  Sp Actemra x1  - inflammatory markers elevated  - US negative for DVT     # New diagnosis of diabetes  - no previous history  - hyperglycemic on admission. A1C 13.7  - consult endocrinology  - ISS and lantus - monitor closely while on steroids  - diabetic education     DVT: lovenox per COVID protocol     Disposition: Pt admitted to the ICU on BIPAP for severe COVID-19 pneumonia. Wean O2 as tolerated- now on high flow. Intubate if worsening respiratory status. Cont remdesivir, decadron. New diagnosis of diabetes. Endocrinology consulted. Intensivist consulted.       Lobo Salamanca DO  Internal Medicine

## 2021-08-14 NOTE — PROGRESS NOTES
Progress Note  Date:2021       Room:[place] [address]  Patient Temo Landers     YOB: 1965     Age:56 y.o. Chief complaint uncontrolled diabetes new onset type 2 diabetes    Subjective    Subjective:  Symptoms:  Stable. Diet:  Poor intake. Activity level: Impaired due to weakness. Review of Systems  Objective         Vitals Last 24 Hours:  TEMPERATURE:  Temp  Av.2 °F (36.8 °C)  Min: 98.1 °F (36.7 °C)  Max: 98.4 °F (36.9 °C)  RESPIRATIONS RANGE: Resp  Av.6  Min: 12  Max: 37  PULSE OXIMETRY RANGE: SpO2  Av %  Min: 85 %  Max: 98 %  PULSE RANGE: Pulse  Av.7  Min: 66  Max: 86  BLOOD PRESSURE RANGE: Systolic (29PGZ), QLP:571 , Min:102 , ODP:615   ; Diastolic (35XDV), SZZ:53, Min:43, Max:84    I/O (24Hr): Intake/Output Summary (Last 24 hours) at 2021 2216  Last data filed at 2021 1825  Gross per 24 hour   Intake 870 ml   Output 1900 ml   Net -1030 ml     Objective:  Vital signs: (most recent): Blood pressure (!) 140/84, pulse 74, temperature 98.1 °F (36.7 °C), temperature source Oral, resp. rate (!) 33, height 6' (1.829 m), weight 254 lb 10.1 oz (115.5 kg), SpO2 94 %. Vital signs are normal.      Labs/Imaging/Diagnostics    Labs:  CBC:  Recent Labs     21  0539 218   WBC 5.2 7.2 5.2   RBC 4.81 4.97 5.01   HGB 14.4 14.9 14.8   HCT 43.2 44.1 44.4   MCV 89.8 88.8 88.6   RDW 13.1 13.3 13.2    272 285     CHEMISTRIES:  Recent Labs     21  0539 21  0525 21  0458    135 137   K 3.9 3.7 4.0    100 99   CO2 19* 18* 25   BUN 22* 24* 21*   CREATININE 0.98 0.76 0.81   GLUCOSE 279* 280* 211*     PT/INR:No results for input(s): PROTIME, INR in the last 72 hours. APTT:No results for input(s): APTT in the last 72 hours.   LIVER PROFILE:  Recent Labs     21  0539 21  0525 21  0458   AST 35 40 34   ALT 27 32 26   BILITOT 0.3 0.3 0.4   ALKPHOS 106* 105* 107*       Imaging Last 24 Hours: No results found. Assessment//Plan           Hospital Problems         Last Modified POA    COVID-19 8/10/2021 Yes    New onset type 2 diabetes mellitus ([address] 8/10/2021 Yes    Hypoxia 8/10/2021 Yes        Assessment:    Condition: In serious condition. Unchanged. (New onset type 2 diabetes worsened also by Decadron  COVID-19 pneumonia  Hypoxemia). Plan:   (Change Lantus to 25 units twice a day Humalog changed to 8 units with each meals plus medium dose sliding scale  Patient seen through glass window  Reviewed nurses consultants notes  Virtual visit due to COVID-19 protocol  Time spent 15 minutes).        Electronically signed by Marito Gan MD on 8/13/21 at 10:16 PM EDT

## 2021-08-15 NOTE — PROGRESS NOTES
Patient on high flow 60L 70% POX 90-94%. States he is feeling better. Had BM this morning. Complains of HA- tylenol given. Otherwise all VSS. Will continue to monitor. 1730- patient down to 60L 50% tolerating well.

## 2021-08-15 NOTE — PROGRESS NOTES
Pulmonary ICU Progress Note    PRIMARY SERVICE: Pulmonary Disease    INTERVAL HPI: Patient seen and examined at bedside, Interval Notes, orders reviewed. Nursing notes noted  Discussed with RN, patient is currently on high flow 60 L and 70 %. His O2 sat is 98%. He is using BiPAP at night. He said he is feeling better today. He has been coughing. Short of breath with any exertion. He has no fever. No chest pain. No nausea, vomiting, diarrhea or abdominal pain. His urine output is 1800 cc. Review of Systems   as above        Intake/Output Summary (Last 24 hours) at 8/15/2021 1154  Last data filed at 8/15/2021 0829  Gross per 24 hour   Intake 250 ml   Output 1800 ml   Net -1550 ml       Vitals:  /82   Pulse 64   Temp 98.3 °F (36.8 °C) (Oral)   Resp 28   Ht 6' (1.829 m)   Wt 254 lb 10.1 oz (115.5 kg)   SpO2 91%   BMI 34.53 kg/m²   EXAM:  General: On high flow O2, comfortable in bed, No distress. Head: Atraumatic ,Normocephalic   Eyes: PERRL. No sclera icterus. No conjunctival injection. No discharge   ENT: No nasal  discharge. Pharynx clear. Neck:  Trachea midline. No thyromegaly, no JVD, No cervical adenopathy. Resp : Normal effort,  No accessory muscle use. Bibasilar Rales. No wheezing. No rhonchi. CV: Normal  rate. Regular rhythm. No mumur ,  Rub or gallop  ABD: Non-tender. Non-distended. No masses. No organmegaly. Normal bowel sounds. No hernia. EXT: No Pitting, No Cyanosis No clubbing  CNS: No focal weakness      ABG:     Lab Results   Component Value Date    PHART 7.308 08/10/2021    SLC0ERJ 17 08/10/2021    PO2ART 144 08/10/2021    AMW7AXL 8.7 08/10/2021    BEART -18 08/10/2021    N2RBWSCM 99 08/10/2021     Lab Results   Component Value Date    LACTA 2.3 08/10/2021     O2 Device: Heated high flow cannula  O2 Flow Rate (L/min): 60 L/min    MV Settings:     FiO2 : 60 %    ADULT DIET; Regular; 4 carb choices (60 gm/meal);  Safety Tray; Safety Tray (Disposables)    IV:    sodium chloride      dextrose         Medications:  Scheduled Meds:   lactulose  20 g Oral BID    insulin glargine  25 Units Subcutaneous BID    insulin lispro  8 Units Subcutaneous TID WC    insulin lispro  0-18 Units Subcutaneous TID WC    insulin lispro  0-9 Units Subcutaneous Nightly    dexamethasone  6 mg Intravenous BID    docusate sodium  100 mg Oral BID    polyethylene glycol  17 g Oral Daily    enoxaparin  30 mg Subcutaneous BID    sodium chloride flush  5-40 mL Intravenous 2 times per day    pantoprazole  40 mg Intravenous Daily    And    sodium chloride (PF)  10 mL Intravenous Daily    traZODone  100 mg Oral Nightly       PRN Meds:  guaiFENesin-codeine, sodium chloride flush, sodium chloride, ondansetron **OR** ondansetron, polyethylene glycol, acetaminophen **OR** acetaminophen, potassium chloride, magnesium sulfate, ipratropium-albuterol, hydrALAZINE, glucose, dextrose, glucagon (rDNA), dextrose        Radiology      XR CHEST PORTABLE    Result Date: 8/10/2021  EXAMINATION: XR CHEST PORTABLE CLINICAL HISTORY: SHORTNESS OF BREATH COMPARISONS: None available. FINDINGS: Patient leaning to left. Osseous structures intact. Cardiopericardial silhouette enlarged. Ill-defined areas of increased opacity are found in the right upper and right lower lung, with air bronchogram in the right lower lung. Air bronchogram increased opacity is also found in the left lower lung, and to lesser degree the left midlung zone. BILATERAL ATELECTASIS/PNEUMONIA. CARDIOMEGALY    US DUP UPPER EXTREMITY RIGHT VENOUS    Result Date: 8/10/2021  EXAMINATION: BILATERAL UPPER EXTREMITY DEEP VENOUS ULTRASOUND WITH DOPPLER IMAGING CLINICAL HISTORY:  Upper extremity swelling COMPARISON: None TECHNIQUE:  [place] scale ultrasound with compression maneuvers where accessible, color and spectral Doppler of the internal jugular, subclavian, axillary and brachial veins was performed bilaterally.   Grey scale with and without compression of the basilic and cephalic  veins was also performed bilaterally. Images were obtained and stored in a permanent archive. RESULT: Examination limited by intravenous lines. RIGHT UPPER EXTREMITY DEEP VEINS: Internal Jugular vein: Normal compression, normal spontaneous flow, Subclavian vein:  Normal, spontaneous flow, Axillary vein:  Normal compression, normal spontaneous flow, Brachial vein:  Normal compression, normal spontaneous flow, RIGHT UPPER EXTREMITY SUPERFICIAL VEINS: Basilic vein: Normal compression Cephalic vein:  Normal compression LEFT UPPER EXTREMITY DEEP VEINS: Internal Jugular vein: Normal compression, normal spontaneous flow, Subclavian vein:  Normal, spontaneous flow, Axillary vein:  Normal compression, normal spontaneous flow, Brachial vein:  Normal compression, normal spontaneous flow, LEFT UPPER EXTREMITY SUPERFICIAL VEINS: Basilic vein: Normal compression Cephalic vein:  Normal compression EXAMINATION: BILATERAL LOWER EXTREMITY DEEP VENOUS ULTRASOUND WITH DOPPLER IMAGING CLINICAL HISTORY: SWELLING COMPARISON: None TECHNIQUE:  Gray scale with compression maneuvers, Color Doppler and Spectral Doppler at rest and with augmentation of the distal external iliac, common femoral, femoral and popliteal veins was performed. Grey scale with compression maneuvers of the peroneal and posterior tibial veins was performed. The great and small saphenous veins were imaged in grey scale with compression maneuvers at their insertion to the deep system. Imaging was performed in both lower extremities. Images were obtained and stored in a permanent archive.  RESULT: RIGHT LOWER EXTREMITY PROXIMAL DEEP VEINS Distal External Iliac and Common Femoral Veins:      Compression: Normal      Doppler: Normal, spontaneous respirophasic flow                     Normal response to augmentation  Femoral vein:      Compression: Normal      Doppler: Normal, spontaneous flow                     Normal response to augmentation Popliteal vein:      Compression: Normal      Doppler: Normal, spontaneous flow                     Normal response to augmentation CALF DEEP VEINS Peroneal veins: Normal compression Posterior tibial veins: Normal compression Gastrocnemius and Soleal veins: Not imaged SUPERFICIAL VEINS Great saphenous: Patent and compressible at insertion into common femoral vein; not otherwise assessed. Small Saphenous:Patent and compressible in the proximal calf, not otherwise assessed. LEFT LOWER EXTREMITY PROXIMAL DEEP VEINS Distal External Iliac and Common Femoral Veins:      Compression: Normal      Doppler: Normal, spontaneous respirophasic flow                      Normal response to augmentation Femoral vein:      Compression: Normal      Doppler: Normal, spontaneous flow                      Normal response to augmentation  Popliteal vein:      Compression: Normal      Doppler: Normal, spontaneous flow                      Normal response to augmentation CALF DEEP VEINS Peroneal veins: Normal compression Posterior tibial veins: Normal compression Gastrocnemius and Soleal veins: Not imaged SUPERFICIAL VEINS Great saphenous: Patent at insertion into common femoral vein; not otherwise assessed. Small Saphenous: Patent and compressible in the proximal calf, not otherwise assessed. NEGATIVE STUDY FOR ACUTE DVT IN THE RIGHT AND LEFT UPPER EXTREMITIES. NEGATIVE STUDY FOR SUPERFICIAL THROMBOPHLEBITIS IN THE RIGHT AND LEFT  UPPER EXTREMITIES. LIMITED EVALUATION OF THE CALF DUE TO TO PATIENT BODY HABITUS. NEGATIVE STUDY FOR ACUTE PROXIMAL DVT IN THE RIGHT AND LEFT LOWER EXTREMITIES. NEGATIVE STUDY FOR ACUTE CALF DVT IN THE RIGHT AND LEFT LOWER EXTREMITIES. NEGATIVE STUDY FOR SUPERFICIAL THROMBOPHLEBITIS IN THE RIGHT AND LEFT LOWER EXTREMITIES.      US DUP UPPER EXTREMITY LEFT VENOUS    Result Date: 8/10/2021  EXAMINATION: BILATERAL UPPER EXTREMITY DEEP VENOUS ULTRASOUND WITH DOPPLER IMAGING CLINICAL HISTORY:  Upper extremity swelling COMPARISON: None TECHNIQUE:  [place] scale ultrasound with compression maneuvers where accessible, color and spectral Doppler of the internal jugular, subclavian, axillary and brachial veins was performed bilaterally. Grey scale with and without compression of the basilic and cephalic  veins was also performed bilaterally. Images were obtained and stored in a permanent archive. RESULT: Examination limited by intravenous lines. RIGHT UPPER EXTREMITY DEEP VEINS: Internal Jugular vein: Normal compression, normal spontaneous flow, Subclavian vein:  Normal, spontaneous flow, Axillary vein:  Normal compression, normal spontaneous flow, Brachial vein:  Normal compression, normal spontaneous flow, RIGHT UPPER EXTREMITY SUPERFICIAL VEINS: Basilic vein: Normal compression Cephalic vein:  Normal compression LEFT UPPER EXTREMITY DEEP VEINS: Internal Jugular vein: Normal compression, normal spontaneous flow, Subclavian vein:  Normal, spontaneous flow, Axillary vein:  Normal compression, normal spontaneous flow, Brachial vein:  Normal compression, normal spontaneous flow, LEFT UPPER EXTREMITY SUPERFICIAL VEINS: Basilic vein: Normal compression Cephalic vein:  Normal compression EXAMINATION: BILATERAL LOWER EXTREMITY DEEP VENOUS ULTRASOUND WITH DOPPLER IMAGING CLINICAL HISTORY: SWELLING COMPARISON: None TECHNIQUE:  Gray scale with compression maneuvers, Color Doppler and Spectral Doppler at rest and with augmentation of the distal external iliac, common femoral, femoral and popliteal veins was performed. Grey scale with compression maneuvers of the peroneal and posterior tibial veins was performed. The great and small saphenous veins were imaged in grey scale with compression maneuvers at their insertion to the deep system. Imaging was performed in both lower extremities. Images were obtained and stored in a permanent archive.  RESULT: RIGHT LOWER EXTREMITY PROXIMAL DEEP VEINS Distal External Iliac and Common Femoral Veins:      Compression: Normal      Doppler: Normal, spontaneous respirophasic flow                     Normal response to augmentation  Femoral vein:      Compression: Normal      Doppler: Normal, spontaneous flow                     Normal response to augmentation Popliteal vein:      Compression: Normal      Doppler: Normal, spontaneous flow                     Normal response to augmentation CALF DEEP VEINS Peroneal veins: Normal compression Posterior tibial veins: Normal compression Gastrocnemius and Soleal veins: Not imaged SUPERFICIAL VEINS Great saphenous: Patent and compressible at insertion into common femoral vein; not otherwise assessed. Small Saphenous:Patent and compressible in the proximal calf, not otherwise assessed. LEFT LOWER EXTREMITY PROXIMAL DEEP VEINS Distal External Iliac and Common Femoral Veins:      Compression: Normal      Doppler: Normal, spontaneous respirophasic flow                      Normal response to augmentation Femoral vein:      Compression: Normal      Doppler: Normal, spontaneous flow                      Normal response to augmentation  Popliteal vein:      Compression: Normal      Doppler: Normal, spontaneous flow                      Normal response to augmentation CALF DEEP VEINS Peroneal veins: Normal compression Posterior tibial veins: Normal compression Gastrocnemius and Soleal veins: Not imaged SUPERFICIAL VEINS Great saphenous: Patent at insertion into common femoral vein; not otherwise assessed. Small Saphenous: Patent and compressible in the proximal calf, not otherwise assessed. NEGATIVE STUDY FOR ACUTE DVT IN THE RIGHT AND LEFT UPPER EXTREMITIES. NEGATIVE STUDY FOR SUPERFICIAL THROMBOPHLEBITIS IN THE RIGHT AND LEFT  UPPER EXTREMITIES. LIMITED EVALUATION OF THE CALF DUE TO TO PATIENT BODY HABITUS. NEGATIVE STUDY FOR ACUTE PROXIMAL DVT IN THE RIGHT AND LEFT LOWER EXTREMITIES. NEGATIVE STUDY FOR ACUTE CALF DVT IN THE RIGHT AND LEFT LOWER EXTREMITIES.  NEGATIVE scale with compression maneuvers at their insertion to the deep system. Imaging was performed in both lower extremities. Images were obtained and stored in a permanent archive. RESULT: RIGHT LOWER EXTREMITY PROXIMAL DEEP VEINS Distal External Iliac and Common Femoral Veins:      Compression: Normal      Doppler: Normal, spontaneous respirophasic flow                     Normal response to augmentation  Femoral vein:      Compression: Normal      Doppler: Normal, spontaneous flow                     Normal response to augmentation Popliteal vein:      Compression: Normal      Doppler: Normal, spontaneous flow                     Normal response to augmentation CALF DEEP VEINS Peroneal veins: Normal compression Posterior tibial veins: Normal compression Gastrocnemius and Soleal veins: Not imaged SUPERFICIAL VEINS Great saphenous: Patent and compressible at insertion into common femoral vein; not otherwise assessed. Small Saphenous:Patent and compressible in the proximal calf, not otherwise assessed. LEFT LOWER EXTREMITY PROXIMAL DEEP VEINS Distal External Iliac and Common Femoral Veins:      Compression: Normal      Doppler: Normal, spontaneous respirophasic flow                      Normal response to augmentation Femoral vein:      Compression: Normal      Doppler: Normal, spontaneous flow                      Normal response to augmentation  Popliteal vein:      Compression: Normal      Doppler: Normal, spontaneous flow                      Normal response to augmentation CALF DEEP VEINS Peroneal veins: Normal compression Posterior tibial veins: Normal compression Gastrocnemius and Soleal veins: Not imaged SUPERFICIAL VEINS Great saphenous: Patent at insertion into common femoral vein; not otherwise assessed. Small Saphenous: Patent and compressible in the proximal calf, not otherwise assessed. NEGATIVE STUDY FOR ACUTE DVT IN THE RIGHT AND LEFT UPPER EXTREMITIES.  NEGATIVE STUDY FOR SUPERFICIAL THROMBOPHLEBITIS IN THE RIGHT AND LEFT  UPPER EXTREMITIES. LIMITED EVALUATION OF THE CALF DUE TO TO PATIENT BODY HABITUS. NEGATIVE STUDY FOR ACUTE PROXIMAL DVT IN THE RIGHT AND LEFT LOWER EXTREMITIES. NEGATIVE STUDY FOR ACUTE CALF DVT IN THE RIGHT AND LEFT LOWER EXTREMITIES. NEGATIVE STUDY FOR SUPERFICIAL THROMBOPHLEBITIS IN THE RIGHT AND LEFT LOWER EXTREMITIES. Results:  CBC:   Recent Labs     08/13/21 0458 08/14/21 0628 08/15/21  0507   WBC 5.2 7.3 9.9   HGB 14.8 14.3 14.3   HCT 44.4 43.3 42.6   MCV 88.6 88.0 87.2    325 331     :   Recent Labs     08/13/21 0458 08/14/21 0628 08/15/21  0507    139 137   K 4.0 3.6 3.5   CL 99 101 101   CO2 25 25 25   BUN 21* 20 18   CREATININE 0.81 0.87 0.73     LIVER PROFILE:   Recent Labs     08/13/21 0458 08/14/21 0628 08/15/21  0507   AST 34 28 37   ALT 26 26 31   BILITOT 0.4 0.4 0.4   ALKPHOS 107* 109* 121*       Assessment: This is a critically ill patient at risk of deterioration / death , needing close ICU monitoring and intervention due to below noted problems      1. Severe COVID-19 infection  2. Acute respiratory failure with hypoxia secondary to above. 3.  Hyperglycemia secondary to steroid  4. Hypercoagulable state secondary to COVID-19 infection    Suggestion:  Continue O2 via high flow nasal cannula. He is currently on 60 L and 70% and O2 saturation is 98%. Titrate down to 60%. Keep saturation 92 to 95%. Use BiPAP as tolerated during daytime and sleep. Continue remdesivir 100 mg IV every 24 hourly. Dexamethasone 6 mg IV twice daily. DVT prophylaxis with subcu Lovenox 30 mg twice a day. Encourage prone position. MetaNeb. Albuterol and Atrovent every 4 hours as needed. ID is following. Critical care time spent reviewing labs/films, examining patient, collaborating with otherphysicians but excluding procedures for life threatening organ failure is 33 minutes.       SIGNATURE: Karl Loera MD, [facility]

## 2021-08-15 NOTE — PROGRESS NOTES
Department of Internal Medicine  General Internal Medicine  Attending Progress Note      SUBJECTIVE:  Pt seen through ICU door to reduce exposure to COVID-19. On high flow nasal cannula.  No distress, asleep    OBJECTIVE      Medications    Current Facility-Administered Medications: guaiFENesin-codeine (GUAIFENESIN AC) 100-10 MG/5ML liquid 5 mL, 5 mL, Oral, Q4H PRN  lactulose (CHRONULAC) 10 GM/15ML solution 20 g, 20 g, Oral, BID  insulin glargine (LANTUS) injection vial 25 Units, 25 Units, Subcutaneous, BID  insulin lispro (HUMALOG) injection vial 8 Units, 8 Units, Subcutaneous, TID WC  insulin lispro (HUMALOG) injection vial 0-18 Units, 0-18 Units, Subcutaneous, TID WC  insulin lispro (HUMALOG) injection vial 0-9 Units, 0-9 Units, Subcutaneous, Nightly  dexamethasone (DECADRON) injection 6 mg, 6 mg, Intravenous, BID  docusate sodium (COLACE) capsule 100 mg, 100 mg, Oral, BID  polyethylene glycol (GLYCOLAX) packet 17 g, 17 g, Oral, Daily  enoxaparin (LOVENOX) injection 30 mg, 30 mg, Subcutaneous, BID  sodium chloride flush 0.9 % injection 5-40 mL, 5-40 mL, Intravenous, 2 times per day  sodium chloride flush 0.9 % injection 5-40 mL, 5-40 mL, Intravenous, PRN  0.9 % sodium chloride infusion, 25 mL, Intravenous, PRN  ondansetron (ZOFRAN-ODT) disintegrating tablet 4 mg, 4 mg, Oral, Q8H PRN **OR** ondansetron (ZOFRAN) injection 4 mg, 4 mg, Intravenous, Q6H PRN  polyethylene glycol (GLYCOLAX) packet 17 g, 17 g, Oral, Daily PRN  acetaminophen (TYLENOL) tablet 650 mg, 650 mg, Oral, Q6H PRN **OR** acetaminophen (TYLENOL) suppository 650 mg, 650 mg, Rectal, Q6H PRN  potassium chloride 10 mEq/100 mL IVPB (Peripheral Line), 10 mEq, Intravenous, PRN  magnesium sulfate 2000 mg in 50 mL IVPB premix, 2,000 mg, Intravenous, PRN  ipratropium-albuterol (DUONEB) nebulizer solution 1 ampule, 1 ampule, Inhalation, Q4H PRN  hydrALAZINE (APRESOLINE) injection 10 mg, 10 mg, Intravenous, Q6H PRN  pantoprazole (PROTONIX) injection 40 mg, 40 mg, Intravenous, Daily **AND** sodium chloride (PF) 0.9 % injection 10 mL, 10 mL, Intravenous, Daily  traZODone (DESYREL) tablet 100 mg, 100 mg, Oral, Nightly  glucose (GLUTOSE) 40 % oral gel 15 g, 15 g, Oral, PRN  dextrose 50 % IV solution, 12.5 g, Intravenous, PRN  glucagon (rDNA) injection 1 mg, 1 mg, Intramuscular, PRN  dextrose 5 % solution, 100 mL/hr, Intravenous, PRN  Physical    VITALS:  /82   Pulse 64   Temp 98.3 °F (36.8 °C) (Oral)   Resp 24   Ht 6' (1.829 m)   Wt 254 lb 10.1 oz (115.5 kg)   SpO2 92%   BMI 34.53 kg/m²   Constitutional: lying in bed comfortably  Head: Normocephalic, atraumatic  Eyes: EOMI, PERRLA  Neck: neck supple, trachea midline  Lungs: no respiratory distress  Heart: RRR  GI: non-distended  MSK: no edema noted     Data    CBC:   Lab Results   Component Value Date    WBC 9.9 08/15/2021    RBC 4.88 08/15/2021    HGB 14.3 08/15/2021    HCT 42.6 08/15/2021    MCV 87.2 08/15/2021    MCH 29.2 08/15/2021    MCHC 33.5 08/15/2021    RDW 13.1 08/15/2021     08/15/2021     CMP:    Lab Results   Component Value Date     08/15/2021    K 3.5 08/15/2021     08/15/2021    CO2 25 08/15/2021    BUN 18 08/15/2021    CREATININE 0.73 08/15/2021    GFRAA >60.0 08/15/2021    LABGLOM >60.0 08/15/2021    GLUCOSE 183 08/15/2021    PROT 5.8 08/15/2021    LABALBU 2.9 08/15/2021    CALCIUM 8.6 08/15/2021    BILITOT 0.4 08/15/2021    ALKPHOS 121 08/15/2021    AST 37 08/15/2021    ALT 31 08/15/2021       ASSESSMENT AND PLAN      # Acute hypoxic respiratory failure 2/2 COVID-19 pneumonia  - COVID positive  - CXR with bilateral infiltrates  - hypoxic 70s on RA. Placed on NRB by EMS and advanced to BIPAP upon arrival to the ED. Now on high flow nasal cannula. Wean as tolerated  - admit to ICU. Intensivist consulted  - completed Remdesivir, cont decadron, lovenox ppx.  Sp Actemra x1  - inflammatory markers elevated  - US negative for DVT     # New diagnosis of diabetes  - no previous history  - hyperglycemic on admission. A1C 13.7  - consult endocrinology  - ISS and lantus - monitor closely while on steroids  - diabetic education     DVT: lovenox per COVID protocol     Disposition: Pt admitted to the ICU on BIPAP for severe COVID-19 pneumonia. Wean O2 as tolerated- now on high flow. Intubate if worsening respiratory status. Cont decadron. New diagnosis of diabetes. Endocrinology consulted. Intensivist consulted.       Anshu Bar DO  Internal Medicine

## 2021-08-15 NOTE — PROGRESS NOTES
Infectious Disease     Patient Name: Augie Ramos  Date: 8/15/2021  YOB: 1965  Medical Record Number: [id number]      COVID-19 pneumonia      Patient presents for a history of shortness of breath steadily worsening admission through the emergency room pulse ox was as low as 70s on room air placed on BiPAP transferred to the intensive care unit COVID-19 positive      EXAMINATION: XR CHEST PORTABLE       CLINICAL HISTORY: SHORTNESS OF BREATH       COMPARISONS: None available.       FINDINGS: Patient leaning to left. Osseous structures intact. Cardiopericardial silhouette enlarged. Ill-defined areas of increased opacity are found in the right upper and right lower lung, with air bronchogram in the right lower lung. Air bronchogram    increased opacity is also found in the left lower lung, and to lesser degree the left midlung zone.           Impression   BILATERAL ATELECTASIS/PNEUMONIA. CARDIOMEGALY       SPO2 88% on high flow nasal cannula oxygen  The worsening throughout the day      Review of Systems   Constitutional:        Feels somewhat better   Respiratory: Positive for cough and shortness of breath. Cardiovascular: Negative for chest pain. Gastrointestinal: Negative for abdominal distention, abdominal pain, diarrhea, nausea and vomiting. Physical Exam  Cardiovascular:      Heart sounds: Normal heart sounds. No murmur heard. Pulmonary:      Effort: Pulmonary effort is normal.      Breath sounds: No wheezing, rhonchi or rales. Abdominal:      General: Abdomen is flat. There is no distension. Palpations: There is no mass. Tenderness: There is no abdominal tenderness. There is no right CVA tenderness, left CVA tenderness, guarding or rebound. Hernia: No hernia is present. Blood pressure 134/73, pulse 72, temperature 98.2 °F (36.8 °C), temperature source Oral, resp. rate 20, height 6' (1.829 m), weight 254 lb 10.1 oz (115.5 kg), SpO2 (!) 88 %.       .   Lab Results   Component Value Date    WBC 9.9 08/15/2021    HGB 14.3 08/15/2021    HCT 42.6 08/15/2021    MCV 87.2 08/15/2021     08/15/2021     Lab Results   Component Value Date     08/15/2021    K 3.5 08/15/2021     08/15/2021    CO2 25 08/15/2021    BUN 18 08/15/2021    CREATININE 0.73 08/15/2021    GLUCOSE 183 08/15/2021    CALCIUM 8.6 08/15/2021            PLAN:    COVID-19 pneumonia  New diagnosis of diabetes  On dexamethasone Lovenox remdesivir  Given 1 dose of Actemra    On high flow O2

## 2021-08-16 NOTE — PROGRESS NOTES
Infectious Disease     Patient Name: Sarita Arthur  Date: 8/16/2021  YOB: 1965  Medical Record Number: [id number]      COVID-19 pneumonia      90%  55l/min    EXAMINATION: XR CHEST PORTABLE       CLINICAL HISTORY: SHORTNESS OF BREATH       COMPARISONS: None available.       FINDINGS: Patient leaning to left. Osseous structures intact. Cardiopericardial silhouette enlarged. Ill-defined areas of increased opacity are found in the right upper and right lower lung, with air bronchogram in the right lower lung. Air bronchogram    increased opacity is also found in the left lower lung, and to lesser degree the left midlung zone.           Impression   BILATERAL ATELECTASIS/PNEUMONIA. CARDIOMEGALY       SPO2 88% on high flow nasal cannula oxygen  The worsening throughout the day      Review of Systems   Constitutional: Negative. Respiratory: Positive for cough and shortness of breath. Cardiovascular: Negative. Gastrointestinal: Negative. Physical Exam  Cardiovascular:      Heart sounds: Normal heart sounds. No murmur heard. Pulmonary:      Effort: Pulmonary effort is normal.      Breath sounds: No wheezing, rhonchi or rales. Abdominal:      General: Abdomen is flat. There is no distension. Palpations: There is no mass. Tenderness: There is no abdominal tenderness. There is no right CVA tenderness, left CVA tenderness, guarding or rebound. Hernia: No hernia is present. Blood pressure (!) 141/69, pulse 86, temperature 98.2 °F (36.8 °C), temperature source Oral, resp. rate 29, height 6' (1.829 m), weight 260 lb (117.9 kg), SpO2 90 %.       .   Lab Results   Component Value Date    WBC 10.6 08/16/2021    HGB 14.7 08/16/2021    HCT 43.9 08/16/2021    MCV 88.0 08/16/2021     08/16/2021     Lab Results   Component Value Date     08/16/2021    K 3.7 08/16/2021    CL 99 08/16/2021    CO2 24 08/16/2021    BUN 17 08/16/2021    CREATININE 0.75 08/16/2021 GLUCOSE 293 08/16/2021    CALCIUM 8.7 08/16/2021            PLAN:    COVID-19 pneumonia  New diagnosis of diabetes  On dexamethasone Lovenox remdesivir  Given 1 dose of Actemra    On high flow O2

## 2021-08-16 NOTE — CARE COORDINATION
Team ICU quality rounds done outside of patient room. He is on HHF 55l 50%fio2 at present. Dr. Tyra Qureshi states pt can tx out of ICU today . Continue to monitor for any dc needs.

## 2021-08-16 NOTE — PROGRESS NOTES
TID WC    insulin lispro  0-9 Units Subcutaneous Nightly    dexamethasone  6 mg Intravenous BID    docusate sodium  100 mg Oral BID    polyethylene glycol  17 g Oral Daily    enoxaparin  30 mg Subcutaneous BID    sodium chloride flush  5-40 mL Intravenous 2 times per day    pantoprazole  40 mg Intravenous Daily    And    sodium chloride (PF)  10 mL Intravenous Daily    traZODone  100 mg Oral Nightly       PRN Meds:  guaiFENesin-codeine, sodium chloride flush, sodium chloride, ondansetron **OR** ondansetron, polyethylene glycol, acetaminophen **OR** acetaminophen, potassium chloride, magnesium sulfate, ipratropium-albuterol, hydrALAZINE, glucose, dextrose, glucagon (rDNA), dextrose    Results: reviewed by me   CBC:   Recent Labs     08/14/21  0628 08/15/21  0507 08/16/21  0515   WBC 7.3 9.9 10.6   HGB 14.3 14.3 14.7   HCT 43.3 42.6 43.9   MCV 88.0 87.2 88.0    331 293     BMP:   Recent Labs     08/14/21  0628 08/15/21  0507 08/16/21  0515    137 137   K 3.6 3.5 3.7    101 99   CO2 25 25 24   BUN 20 18 17   CREATININE 0.87 0.73 0.75     LIVER PROFILE:   Recent Labs     08/14/21  0628 08/15/21  0507 08/16/21  0515   AST 28 37 33   ALT 26 31 32   BILITOT 0.4 0.4 0.3   ALKPHOS 109* 121* 146*     PT/INR: No results for input(s): PROTIME, INR in the last 72 hours. APTT: No results for input(s): APTT in the last 72 hours. UA:No results for input(s): NITRITE, COLORU, PHUR, LABCAST, WBCUA, RBCUA, MUCUS, TRICHOMONAS, YEAST, BACTERIA, CLARITYU, SPECGRAV, LEUKOCYTESUR, UROBILINOGEN, BILIRUBINUR, BLOODU, GLUCOSEU, AMORPHOUS in the last 72 hours. Invalid input(s): Derrill Nissen    Cultures:  Negative so far  Films:  CXR reviewed by me and it showed lateral groundglass infiltrates and consolidations      Assessment:   This is a critically ill patient    · Severe acute hypoxic respiratory failure  · Severe COVID-19 infection   · Hyperglycemia  · Hypercoagulable state    Recommendations  · Continue O2 via high flow nasal cannula target sat 93 to 95%, patient O2 requirement has improved  · BiPAP as needed and while asleep  · Continue dexamethasone currently on twice daily dosing will start weaning down in the next 24 to 48 hours  · Completed remdesivir treatment  · Target blood sugar 140-180, appreciate endocrinology  · Monitor urine output and renal function  · DVT prophylaxis  · Continue Colace and MiraLAX  · Prone position as tolerated  · Continue MetaNeb while in ICU  · We will consider transfer to floor later today    I spent 35 min with this patient, greater the 50% of this time was spent in counseling and/or coordinating of care.             Electronically signed by Ta Kessler MD,  [facility] ,on 8/16/2021 at 8:47 AM

## 2021-08-16 NOTE — PROGRESS NOTES
Hospitalist Daily Progress Note  Name: Elver Garcia  Age: 61 y.o. Gender: male  CodeStatus: Full Code  Allergies: No Known Allergies    Chief Complaint:Shortness of Breath    Primary Care Provider: Teresita Finn DO  InpatientTreatment Team: Treatment Team: Attending Provider: Uziel David DO; Consulting Physician: Toan Reynolds DO; Consulting Physician: Aimee Grant MD; Consulting Physician: Juan Conrad MD; : Kassy Leonardo RN; : Prabha Ren; Utilization Reviewer: Paula Fernandez, RN; Registered Nurse: Jami Eldridge, MELECIO; : Charly Wick, RN; Patient Care Tech: Jorge Anthony  Admission Date: 8/10/2021      Subjective: Patient seen evaluated bedside. Down to 55 L 55% FiO2. Patient initially hesitant to prone however is more agreeable after discussion at the bedside. T-max 98.0 respirations in the mid 30s pulse in the mid 80s blood pressure 141/69 BMP LFT CBC. Mild persistent hyperglycemia within normal limits    Physical Exam  Constitutional:       Appearance: Normal appearance. He is not ill-appearing or diaphoretic. HENT:      Head: Normocephalic and atraumatic. Nose: Nose normal.      Mouth/Throat:      Mouth: Mucous membranes are moist.      Pharynx: Oropharynx is clear. Cardiovascular:      Rate and Rhythm: Normal rate. Heart sounds: No murmur heard. No friction rub. No gallop. Pulmonary:      Breath sounds: No wheezing, rhonchi or rales. Abdominal:      General: There is no distension. Tenderness: There is no abdominal tenderness. There is no guarding. Musculoskeletal:         General: No swelling or tenderness. Normal range of motion. Neurological:      General: No focal deficit present. Mental Status: He is alert and oriented to person, place, and time. Psychiatric:         Mood and Affect: Mood normal.         Thought Content:  Thought content normal.         Judgment: Judgment normal.         Review of Systems  14 point ROS reviewed negative except for as above  Medications:  Reviewed    Infusion Medications:    sodium chloride      dextrose       Scheduled Medications:    lactulose  20 g Oral BID    insulin glargine  25 Units Subcutaneous BID    insulin lispro  8 Units Subcutaneous TID WC    insulin lispro  0-18 Units Subcutaneous TID WC    insulin lispro  0-9 Units Subcutaneous Nightly    dexamethasone  6 mg Intravenous BID    docusate sodium  100 mg Oral BID    polyethylene glycol  17 g Oral Daily    enoxaparin  30 mg Subcutaneous BID    sodium chloride flush  5-40 mL Intravenous 2 times per day    pantoprazole  40 mg Intravenous Daily    And    sodium chloride (PF)  10 mL Intravenous Daily    traZODone  100 mg Oral Nightly     PRN Meds: guaiFENesin-codeine, sodium chloride flush, sodium chloride, ondansetron **OR** ondansetron, polyethylene glycol, acetaminophen **OR** acetaminophen, potassium chloride, magnesium sulfate, ipratropium-albuterol, hydrALAZINE, glucose, dextrose, glucagon (rDNA), dextrose    Labs:   Recent Labs     08/14/21  0628 08/15/21  0507 08/16/21  0515   WBC 7.3 9.9 10.6   HGB 14.3 14.3 14.7   HCT 43.3 42.6 43.9    331 293     Recent Labs     08/14/21  0628 08/15/21  0507 08/16/21  0515    137 137   K 3.6 3.5 3.7    101 99   CO2 25 25 24   BUN 20 18 17   CREATININE 0.87 0.73 0.75   CALCIUM 8.5 8.6 8.7     Recent Labs     08/14/21  0628 08/15/21  0507 08/16/21  0515   AST 28 37 33   ALT 26 31 32   BILITOT 0.4 0.4 0.3   ALKPHOS 109* 121* 146*     No results for input(s): INR in the last 72 hours. No results for input(s): [place] in the last 72 hours. Urinalysis:   No results found for: Georgianne Lute, BACTERIA, RBCUA, BLOODU, [address] [place]    Radiology:   Most recent    Chest CT      WITH CONTRAST:No results found for this or any previous visit. WITHOUT CONTRAST: No results found for this or any previous visit.       CXR      2-view: No results found for this or any previous visit. Portable: Results for orders placed during the hospital encounter of 08/10/21    XR CHEST PORTABLE    Narrative  EXAMINATION: XR CHEST PORTABLE    CLINICAL HISTORY: SHORTNESS OF BREATH    COMPARISONS: None available. FINDINGS: Patient leaning to left. Osseous structures intact. Cardiopericardial silhouette enlarged. Ill-defined areas of increased opacity are found in the right upper and right lower lung, with air bronchogram in the right lower lung. Air bronchogram  increased opacity is also found in the left lower lung, and to lesser degree the left midlung zone. Impression  BILATERAL ATELECTASIS/PNEUMONIA. CARDIOMEGALY      Echo No results found for this or any previous visit. Assessment/Plan:    Active Hospital Problems    Diagnosis Date Noted    COVID-19 [U07.1] 08/10/2021    New onset type 2 diabetes mellitus ([facility] [address] [E11.9]     Hypoxia [R09.02]      Acute hypoxic respiratory failure secondary to COVID-19 pneumonia: Status post remdesivir, Actemra. Continue dexamethasone, wean on transfer out of ICU prone position as tolerated. Wean high flow O2 as tolerated. New onset diabetes: Hemoglobin A1c 13.7. Endocrinology following. Target blood sugars 144-180    DVT prophylaxis per Covid protocol    Additional work up or/and treatment plan may be added today or then after based on clinical progression. I am managing a portion of pt care. Some medical issues are handled byother specialists. Additional work up and treatment should be done in out pt setting by pt PCP and other out pt providers. In addition to examining and evaluating pt, I spent additional time explaining care, normaland abnormal findings, and treatment plan. All of pt questions were answered. Counseling, diet and education were provided. Case will be discussed with nursing staff when appropriate. Family will be updated if and whenappropriate.       Electronically signed by Amena Cobb DO [place] on 8/16/2021 at 4:09 PM

## 2021-08-16 NOTE — FLOWSHEET NOTE
Shift summary [id number]    Shift report at bedside. Pt up x1 to commode per request. On 55L/50% hi flow. sats dipped as low as 69% briefly when up to commode however quickly recovered to mid 80s after about a minute. + void and + BM. Back in bed. Ate all of breakfast and lunch. meds per MAR. Pt hesitant about proning, however did manage to prone for 10  Minutes. He stated it hurt his chest and caused him to feel short of breath and tightness. This resolved as soon as pt flipped back to his back. Electronically signed by Adolfo Barry RN on 8/16/2021 at 1:45 PM     1400 pt transferred to floor bed per preference. To 2W on 100% NRB- respiratory here to walk high flow over with pt.  Electronically signed by Adolfo Barry RN on 8/16/2021 at 2:08 PM.

## 2021-08-17 PROBLEM — J12.82 PNEUMONIA DUE TO COVID-19 VIRUS: Status: ACTIVE | Noted: 2021-01-01

## 2021-08-17 NOTE — PROGRESS NOTES
PT IS UNABLE TO KEEP SPO2 ABOVE 83 ON 55L AND 70% INCREASED TO 60L AND 95% PTS SPO2 INCRAESED TO 92 AT THIS TIME. WILL CONT TO MONITOR AND WEAN AS TOLERATED.

## 2021-08-17 NOTE — PROGRESS NOTES
Pt c/o SOB, worse with exertion or talking. He requested to speak with respiratory regarding his high flow oxygen. He stated that it was taking him too long to recover when he had to get out of bed and wanted it turned up. O2 was turned up to 60L and pt instructed to call if he needs to get up as he stated he was lightheaded with standing. Pt encouraged to lay prone, but states he can't tolerate it very long. Refused to give himself insulin injection. 0425  Pt up to [facility] for BM. SpO2 89% after about 5 minutes of resting back in bed.

## 2021-08-17 NOTE — FLOWSHEET NOTE
Pt awake a &o x4 , c/o dizziness sitting at rest. o2 87-90% on 60L high flow o2.  Dr Alexis Solorzano notified via perfect serve, awaiting response at this time    950am received call from dr Alexis Solorzano , will move pt to icu

## 2021-08-17 NOTE — PROGRESS NOTES
Pulmonary & Critical Care Medicine ICU Progress Note  Chief complaint : Severe acute hypoxic respiratory failure    Subjunctive/24 hour events :   Patient seen and examined during multidisciplinary rounds with RN, charge nurse, RT, pharmacy, dietitian, and social service. Patient transferred back to ICU for worsening hypoxia and increased FiO2 requirement, is currently on 90% FiO2 with 50 L flow, he dropped his saturation after minimal exertion trying to go to bathroom today he did not wait for his nurse prior to moving which led to more exertion. Otherwise no fever, denies chest pain, no coughing, slept okay, no worsening lower extremity edema, he cannot tolerate prone position. Social History     Tobacco Use    Smoking status: Never Smoker    Smokeless tobacco: Never Used   Substance Use Topics    Alcohol use: Never     History reviewed. No pertinent family history. No results for input(s): PHART, SRT8WNJ, PO2ART in the last 72 hours. MV Settings:     / / /FiO2 : 95 %           IV:   sodium chloride      dextrose         Vitals:  /77   Pulse 65   Temp 98.2 °F (36.8 °C)   Resp 20   Ht 6' (1.829 m)   Wt 260 lb (117.9 kg)   SpO2 91%   BMI 35.26 kg/m²    Tmax:      No intake or output data in the 24 hours ending 08/17/21 1639    EXAM:  General: alert, cooperative, no distress  Head: normocephalic, atraumatic  Eyes:No gross abnormalities. ENT:  MMM no lesions  Neck:  supple and no masses  Chest : Good air movement, few rales bilaterally, no wheezing, nontender, tympanic  Heart[de-identified] Heart sounds are normal.  Regular rate and rhythm without murmur, gallop or rub. ABD:  symmetric, soft, non-tender, no guarding or rebound  Musculoskeletal : no cyanosis, no clubbing and no edema  Neuro:  Grossly normal  Skin: No rashes or nodules noted.   Lymph node:  no cervical nodes  Urology: No Lugo   Psychiatric: appropriate    Medications:  Scheduled Meds:   lactulose  20 g Oral BID    insulin glargine 25 Units Subcutaneous BID    insulin lispro  8 Units Subcutaneous TID     insulin lispro  0-18 Units Subcutaneous TID     insulin lispro  0-9 Units Subcutaneous Nightly    dexamethasone  6 mg Intravenous BID    docusate sodium  100 mg Oral BID    polyethylene glycol  17 g Oral Daily    enoxaparin  30 mg Subcutaneous BID    sodium chloride flush  5-40 mL Intravenous 2 times per day    pantoprazole  40 mg Intravenous Daily    And    sodium chloride (PF)  10 mL Intravenous Daily    traZODone  100 mg Oral Nightly       PRN Meds:  guaiFENesin-codeine, sodium chloride flush, sodium chloride, ondansetron **OR** ondansetron, polyethylene glycol, acetaminophen **OR** acetaminophen, potassium chloride, magnesium sulfate, ipratropium-albuterol, hydrALAZINE, glucose, dextrose, glucagon (rDNA), dextrose    Results: reviewed by me   CBC:   Recent Labs     08/15/21  0507 08/16/21  0515 08/17/21  0536   WBC 9.9 10.6 12.5*   HGB 14.3 14.7 15.4   HCT 42.6 43.9 46.6   MCV 87.2 88.0 88.8    293 236     BMP:   Recent Labs     08/15/21  0507 08/16/21  0515 08/17/21  0536    137 137   K 3.5 3.7 3.9    99 98   CO2 25 24 25   BUN 18 17 21*   CREATININE 0.73 0.75 0.85     LIVER PROFILE:   Recent Labs     08/15/21  0507 08/16/21  0515 08/17/21  0536   AST 37 33 28   ALT 31 32 32   BILITOT 0.4 0.3 0.3   ALKPHOS 121* 146* 149*     PT/INR: No results for input(s): PROTIME, INR in the last 72 hours. APTT: No results for input(s): APTT in the last 72 hours. UA:No results for input(s): NITRITE, COLORU, PHUR, LABCAST, WBCUA, RBCUA, MUCUS, TRICHOMONAS, YEAST, BACTERIA, CLARITYU, SPECGRAV, LEUKOCYTESUR, UROBILINOGEN, BILIRUBINUR, BLOODU, GLUCOSEU, AMORPHOUS in the last 72 hours. Invalid input(s): Calvin Lopez    Cultures:  Negative so far  Films:  CXR reviewed by me and it showed lateral groundglass infiltrates and consolidations      Assessment:   This is a critically ill patient    · Severe acute hypoxic respiratory failure  · Severe COVID-19 infection   · Hyperglycemia  · Hypercoagulable state    Recommendations  · Continue O2 via high flow nasal cannula target sat 93 to 95%  · Patient declined BiPAP  · Continue dexamethasone at current dose  · Completed remdesivir treatment  · Target blood sugar 140-180, appreciate endocrinology  · Monitor urine output and renal function  · DVT prophylaxis  · meatneb tibe   · Watch closely in ICU for today pending improvement O2 requirement, intubate if needed. I spent 35 min with this patient, greater the 50% of this time was spent in counseling and/or coordinating of care.             Electronically signed by Anastasiya Lozano MD,  [facility] ,on 8/17/2021 at 4:39 PM

## 2021-08-17 NOTE — PROGRESS NOTES
Progress Note  Date:2021       POAZ:[id number]  Patient Judi Weems     YOB: 1965     Age:56 y.o. Chief complaint uncontrolled type 2 diabetes new onset type 2 diabetes    Subjective    Subjective:  Symptoms:  He reports shortness of breath. Review of Systems   Constitutional: Positive for fatigue. Respiratory: Positive for shortness of breath. All other systems reviewed and are negative. Objective         Vitals Last 24 Hours:  TEMPERATURE:  Temp  Av.1 °F (36.7 °C)  Min: 97.7 °F (36.5 °C)  Max: 98.2 °F (36.8 °C)  RESPIRATIONS RANGE: Resp  Av.9  Min: 17  Max: 34  PULSE OXIMETRY RANGE: SpO2  Av %  Min: 84 %  Max: 96 %  PULSE RANGE: Pulse  Av.1  Min: 63  Max: 94  BLOOD PRESSURE RANGE: Systolic (50UPR), KJM:698 , Min:106 , VW   ; Diastolic (79DNM), VUJ:51, Min:65, Max:90    I/O (24Hr): Intake/Output Summary (Last 24 hours) at 2021 2317  Last data filed at 2021 1334  Gross per 24 hour   Intake 720 ml   Output 650 ml   Net 70 ml     Objective:  General Appearance:  Comfortable. Vital signs: (most recent): Blood pressure 106/72, pulse 82, temperature 98.2 °F (36.8 °C), resp. rate 20, height 6' (1.829 m), weight 260 lb (117.9 kg), SpO2 92 %. Vital signs are normal.      Labs/Imaging/Diagnostics    Labs:  CBC:  Recent Labs     08/14/21  0628 08/15/21  0507 21  0515   WBC 7.3 9.9 10.6   RBC 4.93 4.88 4.98   HGB 14.3 14.3 14.7   HCT 43.3 42.6 43.9   MCV 88.0 87.2 88.0   RDW 13.2 13.1 13.0    331 293     CHEMISTRIES:  Recent Labs     21  0628 08/15/21  0507 21  0515    137 137   K 3.6 3.5 3.7    101 99   CO2 25 25 24   BUN 20 18 17   CREATININE 0.87 0.73 0.75   GLUCOSE 251* 183* 293*   MG  --  1.7  --      PT/INR:No results for input(s): PROTIME, INR in the last 72 hours. APTT:No results for input(s): APTT in the last 72 hours.   LIVER PROFILE:  Recent Labs     21  9430 08/15/21  0507 21  0515

## 2021-08-17 NOTE — CARE COORDINATION
6836: Patient not stable for d/c. Patient respiratory demands increasing. Patient is now on 95% FIO2/HHF 60L--sats 90%. Patient is still in [place] precautions. Will need to continue to follow the patient, monitor for o2 needs. Lsw/cm to follow patient for any new d/c needs or changes to the D/C plan. 1032: ORDERS RECEIVED TO TRANSFER PATIENT TO ICU DT INCREASING RESPIRATORY NEEDS. CM/LSW TO CONTINUE TO FOLLOW.

## 2021-08-17 NOTE — PROGRESS NOTES
Patient arrived into bed 11. Bedside report received from [facility] [place]. VSS at this time.

## 2021-08-17 NOTE — PROGRESS NOTES
Progress Note  Date:2021       Room:[place] [address]  Patient Magda Powell     YOB: 1965     Age:56 y.o. Chief complaint uncontrolled diabetes new onset type 2 diabetes    Subjective    Subjective:  Symptoms:  Worsening. He reports shortness of breath and weakness. Diet:  Poor intake. Review of Systems   Constitutional: Positive for fatigue. Respiratory: Positive for shortness of breath. Neurological: Positive for weakness. Objective         Vitals Last 24 Hours:  TEMPERATURE:  Temp  Av.3 °F (36.8 °C)  Min: 97.8 °F (36.6 °C)  Max: 98.8 °F (37.1 °C)  RESPIRATIONS RANGE: Resp  Av.2  Min: 18  Max: 30  PULSE OXIMETRY RANGE: SpO2  Av.9 %  Min: 84 %  Max: 100 %  PULSE RANGE: Pulse  Av.5  Min: 60  Max: 82  BLOOD PRESSURE RANGE: Systolic (20NPM), WLM:401 , Min:106 , JZM:291   ; Diastolic (18YDO), GVH:61, Min:72, Max:87    I/O (24Hr): No intake or output data in the 24 hours ending 21  Objective:  General Appearance:  Ill-appearing. Vital signs: (most recent): Blood pressure 130/87, pulse 68, temperature 98.8 °F (37.1 °C), temperature source Oral, resp. rate 27, height 6' (1.829 m), weight 260 lb (117.9 kg), SpO2 98 %. Vital signs are normal.      Labs/Imaging/Diagnostics    Labs:  CBC:Recent Labs     08/15/21  0507 21  0515 21  0536   WBC 9.9 10.6 12.5*   RBC 4.88 4.98 5.25   HGB 14.3 14.7 15.4   HCT 42.6 43.9 46.6   MCV 87.2 88.0 88.8   RDW 13.1 13.0 13.2    293 236     CHEMISTRIES:  Recent Labs     08/15/21  0507 21  0515 21  0536    137 137   K 3.5 3.7 3.9    99 98   CO2 25 24 25   BUN *   CREATININE 0.73 0.75 0.85   GLUCOSE 183* 293* 238*   MG 1.7  --   --      PT/INR:No results for input(s): PROTIME, INR in the last 72 hours. APTT:No results for input(s): APTT in the last 72 hours.   LIVER PROFILE:  Recent Labs     08/15/21  0507 21  0515 21  0536   AST 37 33 28   ALT 31 32 32 BILITOT 0.4 0.3 0.3   ALKPHOS 121* 146* 149*       Imaging Last 24 Hours:  No results found. Assessment//Plan           Hospital Problems         Last Modified POA    Pneumonia due to COVID-19 virus 8/17/2021 Yes    New onset type 2 diabetes mellitus ([facility] [address] 8/10/2021 Yes    Hypoxia 8/10/2021 Yes        Assessment:    Condition: In serious condition. Worsening. (COVID-19 pneumonia with worsening hypoxia transfer to ICU  Type 2 diabetes worsened with Decadron  Variable p.o. intake). Plan:   (Continue Lantus 25 units at bedtime plus Humalog 8 units with each meals  Continue other supportive measures as per infectious disease and pulmonary  Virtual visit patient seen through glass window due to COVID-19 protocol  Total time spent was 15 minutes).        Electronically signed by Farida Charles MD on 8/17/21 at [address] PM EDT

## 2021-08-17 NOTE — PROGRESS NOTES
PT IS REFUSING TO WEAR BIPAP. PT SAYS THAT HES UNABLE TO COUGH UP MUCUS WITH THE MASK ON AND THAT IT DRYS HIS AIRWAY OUT TO MUCH.

## 2021-08-17 NOTE — PROGRESS NOTES
Hospitalist Daily Progress Note  Name: Guera Mc  Age: 61 y.o. Gender: male  CodeStatus: Full Code  Allergies: No Known Allergies    Chief Complaint:Shortness of Breath    Primary Care Provider: Troy Vargas DO  InpatientTreatment Team: Treatment Team: Attending Provider: Cecil Burton DO; Consulting Physician: Avani Vance DO; Consulting Physician: Noé Monae MD; Consulting Physician: Ronnie Barnes MD; : Chary Jean RN; Utilization Reviewer: Yesenia Magallanes RN; Unit Clerk: Marco Antonio Desouza; Utilization Reviewer: Frank Spivey, RN; Registered Nurse: Maribell Angeles, RN; Registered Nurse: Donato Mon RN  Admission Date: 8/10/2021      Subjective: Patient seen evaluated bedside. Oxygen requirements up to 60 L 95% FiO2 to maintain a saturation in 87 to 89%. Patient transferred to ICU for further management due to risk of decompensation and need of possible intubation. Patient continues to have dyspnea both at exertion and at rest.  T-max 98.2 respirations 22 pulse 77 blood pressure 128/175 saturating 90% on 95% FiO2 60 L     Physical Exam  Constitutional:       Appearance: Normal appearance. He is not ill-appearing or diaphoretic. HENT:      Head: Normocephalic and atraumatic. Nose: Nose normal.      Mouth/Throat:      Mouth: Mucous membranes are moist.      Pharynx: Oropharynx is clear. Cardiovascular:      Rate and Rhythm: Normal rate. Heart sounds: No murmur heard. No friction rub. No gallop. Pulmonary:      Breath sounds: No wheezing, rhonchi or rales. Abdominal:      General: There is no distension. Tenderness: There is no abdominal tenderness. There is no guarding. Musculoskeletal:         General: No swelling or tenderness. Normal range of motion. Neurological:      General: No focal deficit present. Mental Status: He is alert and oriented to person, place, and time.    Psychiatric:         Mood and Affect: Mood normal.         Thought Content: Thought content normal.         Judgment: Judgment normal.         Review of Systems  14 point ROS reviewed negative except for as above  Medications:  Reviewed    Infusion Medications:    sodium chloride      dextrose       Scheduled Medications:    lactulose  20 g Oral BID    insulin glargine  25 Units Subcutaneous BID    insulin lispro  8 Units Subcutaneous TID WC    insulin lispro  0-18 Units Subcutaneous TID WC    insulin lispro  0-9 Units Subcutaneous Nightly    dexamethasone  6 mg Intravenous BID    docusate sodium  100 mg Oral BID    polyethylene glycol  17 g Oral Daily    enoxaparin  30 mg Subcutaneous BID    sodium chloride flush  5-40 mL Intravenous 2 times per day    pantoprazole  40 mg Intravenous Daily    And    sodium chloride (PF)  10 mL Intravenous Daily    traZODone  100 mg Oral Nightly     PRN Meds: guaiFENesin-codeine, sodium chloride flush, sodium chloride, ondansetron **OR** ondansetron, polyethylene glycol, acetaminophen **OR** acetaminophen, potassium chloride, magnesium sulfate, ipratropium-albuterol, hydrALAZINE, glucose, dextrose, glucagon (rDNA), dextrose    Labs:   Recent Labs     08/15/21  0507 08/16/21  0515 08/17/21  0536   WBC 9.9 10.6 12.5*   HGB 14.3 14.7 15.4   HCT 42.6 43.9 46.6    293 236     Recent Labs     08/15/21  0507 08/16/21  0515 08/17/21  0536    137 137   K 3.5 3.7 3.9    99 98   CO2 25 24 25   BUN 18 17 21*   CREATININE 0.73 0.75 0.85   CALCIUM 8.6 8.7 8.5     Recent Labs     08/15/21  0507 08/16/21  0515 08/17/21  0536   AST 37 33 28   ALT 31 32 32   BILITOT 0.4 0.3 0.3   ALKPHOS 121* 146* 149*     No results for input(s): INR in the last 72 hours. No results for input(s): Jeffrey Saul in the last 72 hours. Urinalysis:   No results found for: Ted Roulette, BACTERIA, RBCUA, BLOODU, [address], [place] [address]    Radiology:   Most recent    Chest CT      WITH CONTRAST:No results found for this or any previous visit.        WITHOUT CONTRAST: No results found for this or any previous visit. CXR      2-view: No results found for this or any previous visit. Portable: Results for orders placed during the hospital encounter of 08/10/21    XR CHEST PORTABLE    Narrative  EXAMINATION: XR CHEST PORTABLE    CLINICAL HISTORY: SHORTNESS OF BREATH    COMPARISONS: None available. FINDINGS: Patient leaning to left. Osseous structures intact. Cardiopericardial silhouette enlarged. Ill-defined areas of increased opacity are found in the right upper and right lower lung, with air bronchogram in the right lower lung. Air bronchogram  increased opacity is also found in the left lower lung, and to lesser degree the left midlung zone. Impression  BILATERAL ATELECTASIS/PNEUMONIA. CARDIOMEGALY      Echo No results found for this or any previous visit. Assessment/Plan:    Active Hospital Problems    Diagnosis Date Noted    Pneumonia due to COVID-19 virus [U07.1, J12.82] 08/10/2021    New onset type 2 diabetes mellitus ([facility] [address] [E11.9]     Hypoxia [R09.02]      Acute hypoxic respiratory failure secondary to COVID-19 pneumonia: Status post remdesivir, Actemra. Continue dexamethasone. Transfer back to ICU due to worsening hypoxia. Continue to wean as tolerated off of FiO2 and encourage proning as much as possible I again reiterated the importance of proning Covid oxygenation,     New onset diabetes: Hemoglobin A1c 13.7. Endocrinology following. Target blood sugars 144-180. Glucose moderately well controlled    DVT prophylaxis per Covid protocol    Additional work up or/and treatment plan may be added today or then after based on clinical progression. I am managing a portion of pt care. Some medical issues are handled byother specialists. Additional work up and treatment should be done in out pt setting by pt PCP and other out pt providers.      In addition to examining and evaluating pt, I spent additional time explaining care, normaland abnormal findings, and treatment plan. All of pt questions were answered. Counseling, diet and education were provided. Case will be discussed with nursing staff when appropriate. Family will be updated if and whenappropriate.       Electronically signed by Emily Marte DO on 8/17/2021 at 2:20 PM

## 2021-08-18 NOTE — PROGRESS NOTES
Hospitalist Daily Progress Note  Name: Lizzie Melendez  Age: 61 y.o. Gender: male  CodeStatus: Full Code  Allergies: No Known Allergies    Chief Complaint:Shortness of Breath    Primary Care Provider: Marcelino Norton DO  InpatientTreatment Team: Treatment Team: Attending Provider: Antonina Barry DO; Consulting Physician: Norman Campbell DO; Consulting Physician: Yousif Leon MD; Consulting Physician: Dot Kwon MD; Utilization Reviewer: Shailesh Hill RN; : Unique Burroughs; Registered Nurse: Cecilia Cline RN  Admission Date: 8/10/2021      Subjective: Patient seen evaluated bedside. Afebrile. T-max 98.9. Vital stable. Saturating at 80% FiO2 at 60 L. Significant desaturation when moving to the bedside commode. Hesitant and resistant to proning. Physical Exam  Constitutional:       Appearance: Normal appearance. He is not ill-appearing or diaphoretic. HENT:      Head: Normocephalic and atraumatic. Nose: Nose normal.      Mouth/Throat:      Mouth: Mucous membranes are moist.      Pharynx: Oropharynx is clear. Cardiovascular:      Rate and Rhythm: Normal rate. Heart sounds: No murmur heard. No friction rub. No gallop. Pulmonary:      Breath sounds: No wheezing, rhonchi or rales. Abdominal:      General: There is no distension. Tenderness: There is no abdominal tenderness. There is no guarding. Musculoskeletal:         General: No swelling or tenderness. Normal range of motion. Neurological:      General: No focal deficit present. Mental Status: He is alert and oriented to person, place, and time. Psychiatric:         Mood and Affect: Mood normal.         Thought Content:  Thought content normal.         Judgment: Judgment normal.         Review of Systems  14 point ROS reviewed negative except for as above  Medications:  Reviewed    Infusion Medications:    sodium chloride      dextrose       Scheduled Medications:    pantoprazole  40 mg Oral QAM AC    lactulose  20 g Oral BID    insulin glargine  25 Units Subcutaneous BID    insulin lispro  8 Units Subcutaneous TID WC    insulin lispro  0-18 Units Subcutaneous TID WC    insulin lispro  0-9 Units Subcutaneous Nightly    dexamethasone  6 mg Intravenous BID    docusate sodium  100 mg Oral BID    polyethylene glycol  17 g Oral Daily    enoxaparin  30 mg Subcutaneous BID    sodium chloride flush  5-40 mL Intravenous 2 times per day    traZODone  100 mg Oral Nightly     PRN Meds: guaiFENesin-codeine, sodium chloride flush, sodium chloride, ondansetron **OR** ondansetron, polyethylene glycol, acetaminophen **OR** acetaminophen, potassium chloride, magnesium sulfate, ipratropium-albuterol, hydrALAZINE, glucose, dextrose, glucagon (rDNA), dextrose    Labs:   Recent Labs     08/16/21 0515 08/17/21  0536 08/18/21  0525   WBC 10.6 12.5* 13.4*   HGB 14.7 15.4 15.8   HCT 43.9 46.6 47.9    236 179     Recent Labs     08/16/21  0515 08/17/21  0536 08/18/21  0525    137 138   K 3.7 3.9 4.1   CL 99 98 98   CO2 24 25 27   BUN 17 21* 19   CREATININE 0.75 0.85 0.80   CALCIUM 8.7 8.5 8.5     Recent Labs     08/16/21  0515 08/17/21  0536 08/18/21  0525   AST 33 28 24   ALT 32 32 29   BILITOT 0.3 0.3 0.5   ALKPHOS 146* 149* 150*     No results for input(s): INR in the last 72 hours. No results for input(s): Jeffrey Saul in the last 72 hours. Urinalysis:   No results found for: Ted Roulette, BACTERIA, RBCUA, BLOODU, [address], [address]    Radiology:   Most recent    Chest CT      WITH CONTRAST:No results found for this or any previous visit. WITHOUT CONTRAST: No results found for this or any previous visit. CXR      2-view: No results found for this or any previous visit.        Portable: Results for orders placed during the hospital encounter of 08/10/21    XR CHEST PORTABLE    Narrative  EXAMINATION: XR CHEST PORTABLE    CLINICAL HISTORY: SHORTNESS OF BREATH    COMPARISONS: None available. FINDINGS: Patient leaning to left. Osseous structures intact. Cardiopericardial silhouette enlarged. Ill-defined areas of increased opacity are found in the right upper and right lower lung, with air bronchogram in the right lower lung. Air bronchogram  increased opacity is also found in the left lower lung, and to lesser degree the left midlung zone. Impression  BILATERAL ATELECTASIS/PNEUMONIA. CARDIOMEGALY      Echo No results found for this or any previous visit. Assessment/Plan:    Active Hospital Problems    Diagnosis Date Noted    Pneumonia due to COVID-19 virus [U07.1, J12.82] 08/10/2021    New onset type 2 diabetes mellitus ([facility] [address] [E11.9]     Hypoxia [R09.02]      Acute hypoxic respiratory failure secondary to COVID-19 pneumonia: Status post remdesivir, Actemra. Continue dexamethasone D7. Wean O2 as tolerated, encourage proning    New onset diabetes: Hemoglobin A1c 13.7. Endocrinology following. Target blood sugars 144-180. Glucose moderately well controlled    DVT prophylaxis per Covid protocol    Additional work up or/and treatment plan may be added today or then after based on clinical progression. I am managing a portion of pt care. Some medical issues are handled byother specialists. Additional work up and treatment should be done in out pt setting by pt PCP and other out pt providers. In addition to examining and evaluating pt, I spent additional time explaining care, normaland abnormal findings, and treatment plan. All of pt questions were answered. Counseling, diet and education were provided. Case will be discussed with nursing staff when appropriate. Family will be updated if and whenappropriate.       Electronically signed by Adrienne Shah DO on 8/18/2021 at 1:32 PM

## 2021-08-18 NOTE — FLOWSHEET NOTE
I assisted patient up to bedside commode. Pt became very SOB, patient assisted back to bed, and recovered. Patient on heated high flow oxygen 90%. Call light in pts reach.

## 2021-08-18 NOTE — FLOWSHEET NOTE
Shift summary: Handoff of care from [employer] this afternoon - pt had uneventful afternoon - stated he is willing to prone tonight - expressed importance of proning and how it can keep him from being intubated - he expressed that he \"won't do that. \" and I asked if he knew that he could possibly die if he gets worse and does not get intubated and he stated \"if the good lord decides to take me, then he can take me,\" - reinforced the importance to prone to prevent that issue from arising - handoff to [employer].  Electronically signed by Shilo Bhatia RN on 8/18/2021 at 7:37 PM

## 2021-08-18 NOTE — CARE COORDINATION
ICU team quality rounds done this am outside of room with Gabriela Bernard and Dr. Nader Luong. Pt moved to ICU for closer monitoring and continue on HHF. We will continue to follow for any needs.

## 2021-08-18 NOTE — PROGRESS NOTES
Infectious Disease     Patient Name: Gustavo Yanes  Date: 8/18/2021  YOB: 1965  Medical Record Number: [id number]      COVID-19 pneumonia    92% 60L/Min        Review of Systems   Constitutional: Positive for diaphoresis and fatigue. Negative for chills and fever. Respiratory: Positive for cough and shortness of breath. Cardiovascular: Negative. Gastrointestinal: Negative. Physical Exam  Constitutional:       Appearance: He is ill-appearing. Cardiovascular:      Heart sounds: Normal heart sounds. No murmur heard. Pulmonary:      Effort: Pulmonary effort is normal.      Breath sounds: No wheezing, rhonchi or rales. Abdominal:      General: Abdomen is flat. There is no distension. Palpations: There is no mass. Tenderness: There is no abdominal tenderness. There is no guarding. Blood pressure 137/76, pulse 66, temperature 98.9 °F (37.2 °C), temperature source Oral, resp. rate 26, height 6' (1.829 m), weight 258 lb 14.4 oz (117.4 kg), SpO2 92 %. .   Lab Results   Component Value Date    WBC 13.4 (H) 08/18/2021    HGB 15.8 08/18/2021    HCT 47.9 08/18/2021    MCV 89.0 08/18/2021     08/18/2021     Lab Results   Component Value Date     08/18/2021    K 4.1 08/18/2021    CL 98 08/18/2021    CO2 27 08/18/2021    BUN 19 08/18/2021    CREATININE 0.80 08/18/2021    GLUCOSE 172 08/18/2021    CALCIUM 8.5 08/18/2021      EXAMINATION: CHEST PORTABLE VIEW       CLINICAL HISTORY: Increasing short of breath. Correlate. Follow-up       COMPARISONS: August 10, 2021       FINDINGS:       Single  views of the chest is submitted.  The cardiac silhouette is enlarged   Pulmonary vascular unremarkable. Right sided trachea. Persistent multifocal to patchy glass infiltrates throughout the lung parenchyma.  No Pneumothoraces.                                                                                        Impression   PERSISTENT PATCHY BILATERAL MULTIFOCAL TO

## 2021-08-18 NOTE — PROGRESS NOTES
Pulmonary & Critical Care Medicine ICU Progress Note  Chief complaint : Severe acute hypoxic respiratory failure    Subjunctive/24 hour events :   Patient seen and examined during multidisciplinary rounds with RN, charge nurse, RT, pharmacy, dietitian, and social service. Feels better, shortness of breath improved, he slept well, he still 90% FiO2 60 L flow, denies chest pain, no coughing, T-max 37.2, no nausea no vomiting, bowels moving, urine output 550 cc. Social History     Tobacco Use    Smoking status: Never Smoker    Smokeless tobacco: Never Used   Substance Use Topics    Alcohol use: Never     History reviewed. No pertinent family history. No results for input(s): PHART, QIO2GFI, PO2ART in the last 72 hours. MV Settings:     / / /FiO2 : 90 %           IV:   sodium chloride      dextrose         Vitals:  BP (!) 144/79   Pulse 72   Temp 98.9 °F (37.2 °C) (Oral)   Resp 20   Ht 6' (1.829 m)   Wt 258 lb 14.4 oz (117.4 kg)   SpO2 93%   BMI 35.11 kg/m²    Tmax:        Intake/Output Summary (Last 24 hours) at 8/18/2021 0834  Last data filed at 8/17/2021 1940  Gross per 24 hour   Intake --   Output 550 ml   Net -550 ml       EXAM:  General: alert, cooperative, no distress  Head: normocephalic, atraumatic  Eyes:No gross abnormalities. ENT:  MMM no lesions  Neck:  supple and no masses  Chest : Good air movement, bilateral rales, no wheezing, nontender, tympanic  Heart[de-identified] Heart sounds are normal.  Regular rate and rhythm without murmur, gallop or rub. ABD:  symmetric, soft, non-tender, no guarding or rebound  Musculoskeletal : no cyanosis, no clubbing and no edema  Neuro:  Grossly normal  Skin: No rashes or nodules noted.   Lymph node:  no cervical nodes  Urology: No Lugo   Psychiatric: appropriate    Medications:  Scheduled Meds:   lactulose  20 g Oral BID    insulin glargine  25 Units Subcutaneous BID    insulin lispro  8 Units Subcutaneous TID     insulin lispro  0-18 Units Subcutaneous TID WC    insulin lispro  0-9 Units Subcutaneous Nightly    dexamethasone  6 mg Intravenous BID    docusate sodium  100 mg Oral BID    polyethylene glycol  17 g Oral Daily    enoxaparin  30 mg Subcutaneous BID    sodium chloride flush  5-40 mL Intravenous 2 times per day    pantoprazole  40 mg Intravenous Daily    And    sodium chloride (PF)  10 mL Intravenous Daily    traZODone  100 mg Oral Nightly       PRN Meds:  guaiFENesin-codeine, sodium chloride flush, sodium chloride, ondansetron **OR** ondansetron, polyethylene glycol, acetaminophen **OR** acetaminophen, potassium chloride, magnesium sulfate, ipratropium-albuterol, hydrALAZINE, glucose, dextrose, glucagon (rDNA), dextrose    Results: reviewed by me   CBC:   Recent Labs     08/16/21 0515 08/17/21  0536 08/18/21  0525   WBC 10.6 12.5* 13.4*   HGB 14.7 15.4 15.8   HCT 43.9 46.6 47.9   MCV 88.0 88.8 89.0    236 179     BMP:   Recent Labs     08/16/21 0515 08/17/21  0536 08/18/21  0525    137 138   K 3.7 3.9 4.1   CL 99 98 98   CO2 24 25 27   BUN 17 21* 19   CREATININE 0.75 0.85 0.80     LIVER PROFILE:   Recent Labs     08/16/21 0515 08/17/21  0536 08/18/21  0525   AST 33 28 24   ALT 32 32 29   BILITOT 0.3 0.3 0.5   ALKPHOS 146* 149* 150*     PT/INR: No results for input(s): PROTIME, INR in the last 72 hours. APTT: No results for input(s): APTT in the last 72 hours. UA:No results for input(s): NITRITE, COLORU, PHUR, LABCAST, WBCUA, RBCUA, MUCUS, TRICHOMONAS, YEAST, BACTERIA, CLARITYU, SPECGRAV, LEUKOCYTESUR, UROBILINOGEN, BILIRUBINUR, BLOODU, GLUCOSEU, AMORPHOUS in the last 72 hours. Invalid input(s): Spencer An    Cultures:  Negative so far  Films:  CXR reviewed by me and it showed lateral groundglass infiltrates and consolidations      Assessment:   This is a critically ill patient    · Severe acute hypoxic respiratory failure  · Severe COVID-19 infection   · Hyperglycemia  · Hypercoagulable state    Recommendations  · Continue O2 via high flow nasal cannula target sat 93 to 95%  · Continue dexamethasone  · Completed remdesivir treatment  · Target blood sugar 140-180, appreciate endocrinology  · Monitor urine output and renal function  · DVT prophylaxis  · meatneb TID   · Continue to monitor in ICU      DW Dr Teresa Costello     I spent 35  min with this patient, greater the 50% of this time was spent in counseling and/or coordinating of care.             Electronically signed by Beatriz Pimentel MD,  [facility] ,on 8/18/2021 at 8:34 AM

## 2021-08-18 NOTE — PROGRESS NOTES
Nutrition Assessment    Type and Reason for Visit:  RD Nutrition Re-Screen/LOS    Nutrition Recommendations/Plan: Continue current plan of care    Nutrition Assessment:  Nutritional status appears adeqaute at this time, appetite and intake appear acceptable , despite COVID. Noted new diagnosis of DM with need for edcuation once out of droplet precautions    Malnutrition Assessment:  Malnutrition Status:  No malnutrition         Nutrition Related Findings:  No GI issues,      Wounds:  None       Current Nutrition Therapies:    ADULT DIET; Regular; 4 carb choices (60 gm/meal);  Safety Tray; Safety Tray (Disposables)    Anthropometric Measures:  · Height: 6' (182.9 cm)  · Current Body Weight: 258 lb (117 kg)   · Admission Body Weight: 250 lb (113.4 kg) (stated)    · Usual Body Weight:  (n/n, no EMR wts)     · Ideal Body Weight: 178 lbs;   · BMI Categories: Obese Class 2 (BMI 35.0 -39.9)       Nutrition Diagnosis:   · Altered nutrition-related lab values related to endocrine dysfuntion as evidenced by lab values    Nutrition Interventions:   Food and/or Nutrient Delivery:  Continue Current Diet  Nutrition Education/Counseling:  Education needed   Coordination of Nutrition Care:  Continue to monitor while inpatient    Goals:  po > 75%, gluc < 180       Nutrition Monitoring and Evaluation:     Food/Nutrient Intake Outcomes:  Food and Nutrient Intake  Physical Signs/Symptoms Outcomes:  Meal Time Behavior, Biochemical Data     Discharge Planning:    Continue current diet, Recommend pursue outpatient diabetes education     Electronically signed by Jose Antonio Savage RD, LD on 8/18/21 at 10:21 AM EDT

## 2021-08-19 NOTE — PROGRESS NOTES
Progress Note  Date:2021       Room:[place] [address]  Patient Elta Babinski     YOB: 1965     Age:56 y.o. Chief complaint new onset type 2 diabetes    Subjective    Subjective:  Symptoms:  Stable. He reports shortness of breath. Diet:  Poor intake. Activity level: Impaired due to weakness. Review of Systems   Respiratory: Positive for shortness of breath. All other systems reviewed and are negative. Objective         Vitals Last 24 Hours:  TEMPERATURE:  Temp  Av.6 °F (37 °C)  Min: 98.4 °F (36.9 °C)  Max: 98.9 °F (37.2 °C)  RESPIRATIONS RANGE: Resp  Av.6  Min: 13  Max: 32  PULSE OXIMETRY RANGE: SpO2  Av.4 %  Min: 81 %  Max: 98 %  PULSE RANGE: Pulse  Av.3  Min: 56  Max: 94  BLOOD PRESSURE RANGE: Systolic (68SSQ), ETW:440 , Min:108 , XBZ:590   ; Diastolic (59RMJ), STL:06, Min:48, Max:98    I/O (24Hr): Intake/Output Summary (Last 24 hours) at 2021 2255  Last data filed at 2021 2100  Gross per 24 hour   Intake --   Output 1750 ml   Net -1750 ml     Objective:  General Appearance:  Ill-appearing. Vital signs: (most recent): Blood pressure (!) 144/67, pulse 91, temperature 98.4 °F (36.9 °C), temperature source Oral, resp. rate 23, height 6' (1.829 m), weight 258 lb 14.4 oz (117.4 kg), SpO2 (!) 89 %. Vital signs are normal.      Labs/Imaging/Diagnostics    Labs:  CBC:  Recent Labs     21  05   WBC 10.6 12.5* 13.4*   RBC 4.98 5.25 5.39   HGB 14.7 15.4 15.8   HCT 43.9 46.6 47.9   MCV 88.0 88.8 89.0   RDW 13.0 13.2 13.3    236 179     CHEMISTRIES:  Recent Labs     2136 21  0525    137 138   K 3.7 3.9 4.1   CL 99 98 98   CO2 24 25 27   BUN 17 21* 19   CREATININE 0.75 0.85 0.80   GLUCOSE 293* 238* 172*     PT/INR:No results for input(s): PROTIME, INR in the last 72 hours. APTT:No results for input(s): APTT in the last 72 hours.   LIVER PROFILE:  Recent Labs 08/16/21  0515 08/17/21  0536 08/18/21  0525   AST 33 28 24   ALT 32 32 29   BILITOT 0.3 0.3 0.5   ALKPHOS 146* 149* 150*       Imaging Last 24 Hours:  XR CHEST PORTABLE    Result Date: 8/18/2021  EXAMINATION: CHEST PORTABLE VIEW  CLINICAL HISTORY: Increasing short of breath. Correlate. Follow-up COMPARISONS: August 10, 2021  FINDINGS: Single  views of the chest is submitted. The cardiac silhouette is enlarged Pulmonary vascular unremarkable. Right sided trachea. Persistent multifocal to patchy glass infiltrates throughout the lung parenchyma. No Pneumothoraces. PERSISTENT PATCHY BILATERAL MULTIFOCAL TO GLASS INFILTRATES THROUGHOUT THE LUNG PARENCHYMA    Assessment//Plan           Hospital Problems         Last Modified POA    Pneumonia due to COVID-19 virus 8/17/2021 Yes    New onset type 2 diabetes mellitus ([facility] [address] 8/10/2021 Yes    Hypoxia 8/10/2021 Yes        Assessment:    Condition: In serious condition. Unchanged. (Uncontrolled/new onset type 2 diabetes disease  Hypoxia COVID-19 pneumonia on IV Decadron ). Plan:   (Continue Lantus 25 units at bedtime plus Humalog 8 units with each meal patient seen through glass window reviewed other consultants notes  Covid 19 protocol virtual visit total time spent 15 minutes).        Electronically signed by Ninfa Diego MD on 8/18/21 at 10:55 PM EDT

## 2021-08-19 NOTE — PROGRESS NOTES
Pulmonary & Critical Care Medicine ICU Progress Note  Chief complaint : Severe acute hypoxic respiratory failure    Subjunctive/24 hour events :   Patient seen and examined during multidisciplinary rounds with RN, charge nurse, RT, pharmacy, dietitian, and social service. He is somehow short of breath today, is back on 95% FiO2 and 60 L, he is unable to tolerate prone position, no fever overnight, no nausea no vomiting, no abdominal pain,+ bowel movement, urine output 2500 cc. Social History     Tobacco Use    Smoking status: Never Smoker    Smokeless tobacco: Never Used   Substance Use Topics    Alcohol use: Never     History reviewed. No pertinent family history. No results for input(s): PHART, DCE1CSU, PO2ART in the last 72 hours. MV Settings:     / / /FiO2 : 95 %           IV:   sodium chloride      dextrose         Vitals:  /77   Pulse 79   Temp 98.4 °F (36.9 °C) (Oral)   Resp 16   Ht 6' (1.829 m)   Wt 246 lb 11.1 oz (111.9 kg)   SpO2 (!) 85%   BMI 33.46 kg/m²    Tmax:        Intake/Output Summary (Last 24 hours) at 8/19/2021 0850  Last data filed at 8/19/2021 0600  Gross per 24 hour   Intake --   Output 2500 ml   Net -2500 ml       EXAM:  General: alert, cooperative, no distress  Head: normocephalic, atraumatic  Eyes:No gross abnormalities. ENT:  MMM no lesions  Neck:  supple and no masses  Chest : Good air movement, minimal bilateral rales, no wheezing, nontender, tympanic  Heart[de-identified] Heart sounds are normal.  Regular rate and rhythm without murmur, gallop or rub. ABD:  symmetric, soft, non-tender, no guarding or rebound  Musculoskeletal : no cyanosis, no clubbing and no edema  Neuro:  Grossly normal  Skin: No rashes or nodules noted.   Lymph node:  no cervical nodes  Urology: No Lugo   Psychiatric: appropriate    Medications:  Scheduled Meds:   pantoprazole  40 mg Oral QAM AC    lactulose  20 g Oral BID    insulin glargine  25 Units Subcutaneous BID    insulin lispro  8 Units Subcutaneous TID     insulin lispro  0-18 Units Subcutaneous TID     insulin lispro  0-9 Units Subcutaneous Nightly    dexamethasone  6 mg Intravenous BID    docusate sodium  100 mg Oral BID    polyethylene glycol  17 g Oral Daily    enoxaparin  30 mg Subcutaneous BID    sodium chloride flush  5-40 mL Intravenous 2 times per day    traZODone  100 mg Oral Nightly       PRN Meds:  guaiFENesin-codeine, sodium chloride flush, sodium chloride, ondansetron **OR** ondansetron, polyethylene glycol, acetaminophen **OR** acetaminophen, potassium chloride, magnesium sulfate, ipratropium-albuterol, hydrALAZINE, glucose, dextrose, glucagon (rDNA), dextrose    Results: reviewed by me   CBC:   Recent Labs     08/17/21 0536 08/18/21 0525 08/19/21 0522   WBC 12.5* 13.4* 17.6*   HGB 15.4 15.8 16.0   HCT 46.6 47.9 48.1   MCV 88.8 89.0 88.7    179 148     BMP:   Recent Labs     08/17/21 0536 08/18/21 0525 08/19/21 0522    138 136   K 3.9 4.1 4.3   CL 98 98 98   CO2 25 27 24   BUN 21* 19 17   CREATININE 0.85 0.80 0.64*     LIVER PROFILE:   Recent Labs     08/17/21 0536 08/18/21 0525 08/19/21 0522   AST 28 24 28   ALT 32 29 25   BILITOT 0.3 0.5 0.5   ALKPHOS 149* 150* 170*     PT/INR: No results for input(s): PROTIME, INR in the last 72 hours. APTT: No results for input(s): APTT in the last 72 hours. UA:No results for input(s): NITRITE, COLORU, PHUR, LABCAST, WBCUA, RBCUA, MUCUS, TRICHOMONAS, YEAST, BACTERIA, CLARITYU, SPECGRAV, LEUKOCYTESUR, UROBILINOGEN, BILIRUBINUR, BLOODU, GLUCOSEU, AMORPHOUS in the last 72 hours. Invalid input(s): Sheryle [place]    Cultures:  Negative so far  Films:  CXR reviewed by me and it showed lateral groundglass infiltrates and consolidations      Assessment:   This is a critically ill patient    · Severe acute hypoxic respiratory failure  · Severe COVID-19 infection   · Hyperglycemia  · Hypercoagulable state    Recommendations  · Continue O2 via high flow nasal cannula target sat 93 to 95%  · Continue dexamethasone  · Completed remdesivir treatment  · Target blood sugar 140-180, appreciate endocrinology  · Monitor urine output and renal function  · DVT prophylaxis  · meatneb TID   · Chest x-ray tomorrow  · Continue to monitor in ICU, intubate if worsening      DW Dr Manjinder Ashley     I spent 36  min with this patient, greater the 50% of this time was spent in counseling and/or coordinating of care.             Electronically signed by Jaquelin Griffin MD,  [facility] ,on 8/19/2021 at 8:50 AM

## 2021-08-19 NOTE — PROGRESS NOTES
Hospitalist Daily Progress Note  Name: Morena Mandujano  Age: 61 y.o. Gender: male  CodeStatus: Full Code  Allergies: No Known Allergies    Chief Complaint:Shortness of Breath    Primary Care Provider: Estrellita Flynn DO  InpatientTreatment Team: Treatment Team: Attending Provider: Satish Ledesma DO; Consulting Physician: Jean Litten, DO; Consulting Physician: Mir Boo MD; Consulting Physician: Leny Hernandez MD; Utilization Reviewer: Renea Magaña RN; : Zhane Elder RN; Registered Nurse: Edmond Glynn RN  Admission Date: 8/10/2021      Subjective: Patient seen evaluated bedside. Afebrile. T-max 98.4. Vital stable. Saturating at 95% FiO2 at 60 L. Unable to tolerate prone    Physical Exam  Constitutional:       Appearance: Normal appearance. He is not ill-appearing or diaphoretic. HENT:      Head: Normocephalic and atraumatic. Nose: Nose normal.      Mouth/Throat:      Mouth: Mucous membranes are moist.      Pharynx: Oropharynx is clear. Cardiovascular:      Rate and Rhythm: Normal rate. Heart sounds: No murmur heard. No friction rub. No gallop. Pulmonary:      Breath sounds: No wheezing, rhonchi or rales. Abdominal:      General: There is no distension. Tenderness: There is no abdominal tenderness. There is no guarding. Musculoskeletal:         General: No swelling or tenderness. Normal range of motion. Neurological:      General: No focal deficit present. Mental Status: He is alert and oriented to person, place, and time. Psychiatric:         Mood and Affect: Mood normal.         Thought Content:  Thought content normal.         Judgment: Judgment normal.         Review of Systems  14 point ROS reviewed negative except for as above  Medications:  Reviewed    Infusion Medications:    sodium chloride      dextrose       Scheduled Medications:    pantoprazole  40 mg Oral QAM AC    insulin glargine  25 Units Subcutaneous BID    insulin lispro  8 Units Subcutaneous TID     insulin lispro  0-18 Units Subcutaneous TID     insulin lispro  0-9 Units Subcutaneous Nightly    dexamethasone  6 mg Intravenous BID    docusate sodium  100 mg Oral BID    polyethylene glycol  17 g Oral Daily    enoxaparin  30 mg Subcutaneous BID    sodium chloride flush  5-40 mL Intravenous 2 times per day    traZODone  100 mg Oral Nightly     PRN Meds: guaiFENesin-codeine, sodium chloride flush, sodium chloride, ondansetron **OR** ondansetron, polyethylene glycol, acetaminophen **OR** acetaminophen, potassium chloride, magnesium sulfate, ipratropium-albuterol, hydrALAZINE, glucose, dextrose, glucagon (rDNA), dextrose    Labs:   Recent Labs     08/17/21 0536 08/18/21 0525 08/19/21  0522   WBC 12.5* 13.4* 17.6*   HGB 15.4 15.8 16.0   HCT 46.6 47.9 48.1    179 148     Recent Labs     08/17/21 0536 08/18/21 0525 08/19/21  0522    138 136   K 3.9 4.1 4.3   CL 98 98 98   CO2 25 27 24   BUN 21* 19 17   CREATININE 0.85 0.80 0.64*   CALCIUM 8.5 8.5 8.4*     Recent Labs     08/17/21 0536 08/18/21 0525 08/19/21  0522   AST 28 24 28   ALT 32 29 25   BILITOT 0.3 0.5 0.5   ALKPHOS 149* 150* 170*     No results for input(s): INR in the last 72 hours. No results for input(s): Yamilet Belts in the last 72 hours. Urinalysis:   No results found for: Elnita Danker, BACTERIA, RBCUA, BLOODU, [address] [place] [address]    Radiology:   Most recent    Chest CT      WITH CONTRAST:No results found for this or any previous visit. WITHOUT CONTRAST: No results found for this or any previous visit. CXR      2-view: No results found for this or any previous visit. Portable: Results for orders placed during the hospital encounter of 08/10/21    XR CHEST PORTABLE    Narrative  EXAMINATION: XR CHEST PORTABLE    CLINICAL HISTORY: SHORTNESS OF BREATH    COMPARISONS: None available. FINDINGS: Patient leaning to left. Osseous structures intact.  Cardiopericardial silhouette enlarged. Ill-defined areas of increased opacity are found in the right upper and right lower lung, with air bronchogram in the right lower lung. Air bronchogram  increased opacity is also found in the left lower lung, and to lesser degree the left midlung zone. Impression  BILATERAL ATELECTASIS/PNEUMONIA. CARDIOMEGALY      Echo No results found for this or any previous visit. Assessment/Plan:    Active Hospital Problems    Diagnosis Date Noted    Pneumonia due to COVID-19 virus [U07.1, J12.82] 08/10/2021    New onset type 2 diabetes mellitus ([facility] [address] [E11.9]     Hypoxia [R09.02]      Acute hypoxic respiratory failure secondary to COVID-19 pneumonia: Status post remdesivir, Actemra. Continue dexamethasone D8. Wean O2 as tolerated, encourage proning    New onset diabetes: Hemoglobin A1c 13.7. Endocrinology following. Target blood sugars 144-180. Glucose moderately well controlled    DVT prophylaxis per Covid protocol    Additional work up or/and treatment plan may be added today or then after based on clinical progression. I am managing a portion of pt care. Some medical issues are handled byother specialists. Additional work up and treatment should be done in out pt setting by pt PCP and other out pt providers. In addition to examining and evaluating pt, I spent additional time explaining care, normaland abnormal findings, and treatment plan. All of pt questions were answered. Counseling, diet and education were provided. Case will be discussed with nursing staff when appropriate. Family will be updated if and whenappropriate.       Electronically signed by Devyn Eagle DO on 8/19/2021 at 11:26 AM

## 2021-08-19 NOTE — PROGRESS NOTES
Infectious Disease     Patient Name: Augie Ramos  Date: 8/19/2021  YOB: 1965  Medical Record Number: [id number]      COVID-19 pneumonia    90% 60L/Min        Review of Systems   Constitutional: Negative for chills, diaphoresis, fatigue and fever. Respiratory: Positive for cough and shortness of breath. Cardiovascular: Negative. Gastrointestinal: Negative. Physical Exam  Constitutional:       General: He is not in acute distress. Appearance: He is not ill-appearing. Cardiovascular:      Heart sounds: Normal heart sounds. No murmur heard. Pulmonary:      Effort: Pulmonary effort is normal.      Breath sounds: No wheezing, rhonchi or rales. Abdominal:      General: Abdomen is flat. There is no distension. Palpations: There is no mass. Tenderness: There is no abdominal tenderness. There is no guarding. Blood pressure (!) 144/84, pulse 82, temperature 98.4 °F (36.9 °C), temperature source Oral, resp. rate 13, height 6' (1.829 m), weight 246 lb 11.1 oz (111.9 kg), SpO2 90 %. .   Lab Results   Component Value Date    WBC 17.6 (H) 08/19/2021    HGB 16.0 08/19/2021    HCT 48.1 08/19/2021    MCV 88.7 08/19/2021     08/19/2021     Lab Results   Component Value Date     08/19/2021    K 4.3 08/19/2021    CL 98 08/19/2021    CO2 24 08/19/2021    BUN 17 08/19/2021    CREATININE 0.64 08/19/2021    GLUCOSE 130 08/19/2021    CALCIUM 8.4 08/19/2021      EXAMINATION: CHEST PORTABLE VIEW       CLINICAL HISTORY: Increasing short of breath. Correlate. Follow-up       COMPARISONS: August 10, 2021       FINDINGS:       Single  views of the chest is submitted.  The cardiac silhouette is enlarged   Pulmonary vascular unremarkable. Right sided trachea. Persistent multifocal to patchy glass infiltrates throughout the lung parenchyma.  No Pneumothoraces.                                                                                        Impression   PERSISTENT PATCHY BILATERAL MULTIFOCAL TO GLASS INFILTRATES THROUGHOUT THE LUNG PARENCHYMA           PLAN:    COVID-19 pneumonia      On dexamethasone Lovenox   completed  remdesivir  Given 1 dose of Actemra    On high flow O2

## 2021-08-20 NOTE — FLOWSHEET NOTE
Shift summary    8420-9116 AM assessment complete. A&ox4, BAUTISTA/follows commands. LS dim, on high flow 95%/60L, shallow breathing reported per pt, SOB and desaturations noted with any exertion. sats 85-88% on hi flow. abd s/nt bs + good PO intake. Per pt request wants to get up to [facility] for BM. Pt stood and pivoted to [facility] while on high flow, sats did dip initially to low 70s. After a few minutes, pt notably more SOB- shallow labored breathing in upper 30s, sats did dip to lower 60s/ upper 50s briefly. Pt assisted back to bed. Pt inquiring about ventilator, how many patients came off the ventilator with COVID, etc. Explained importance of proning and even wearing BIPAP. Pt agreeable to BIPAP- placed on @ 0900 with immediate increase in sats to low 90s. Pt still showing labored breathing but less so with BIPAP. Pt requesting to talk to MD re: intubation. Pt expressed fear over situation, stated \"I'm gonna end up on that machine aren't I\". ICU rounds done, Dr. Art Velez and Dr. Castaneda Come updated on pt requests. Fentanyl x1 given for work of breathing, lasix given. Pt HR and RR decreased slightly from fentanyl and pt showed less work of breathing. Pt turned onto L side to lay. States he is unable to tolerate laying on his back. 1100 pt used call light, requested assistance with urinal- desatted to low 70s and had resp rate of low 40s while on BIPAP 100% FIO2. Call to Dr. Art Velez to update- order received for precedex. Pt c/o side cramps from work of breathing. Pt turned onto back/high fowlers per request. Electronically signed by Emile Sandhoff, RN on 8/20/2021 at 11:18 AM     1155 remains on BIPAP, sats low 90s. precedex gtt started. Does not want lunch right now. 1215 spoke with pt son Yoli Licona, who had HIPAA code. Updated. States he is an only child. Entered as an emergency contact.  Electronically signed by Emile Sandhoff, RN on 8/20/2021 at 12:19 PM      1350 pt off of BIPAP/ placed on high flow per request so he could eat his lunch. C/o the air \"pushing too hard\" on his face and drying out his throat. precedex gtt adjusted for comfort. Electronically signed by Conrad Conrad RN on 8/20/2021 at 1:57 PM      1500 resting comfortably with eyes closed, vss    1750 pt asleep, easily awoken, states he was happy to finally get some sleep, RR even/unlab (mid 20s), sats upper 80s/low 90s on high flow. meds per MAR. Altagracia per request.    1915 report to [place], [address] [place] [address]. Pt remains on high flow at this time.  Electronically signed by Conrad Conrad RN on 8/20/2021 at 7:57 PM

## 2021-08-20 NOTE — PROGRESS NOTES
Pulmonary & Critical Care Medicine ICU Progress Note  Chief complaint : Severe acute hypoxic respiratory failure    Subjunctive/24 hour events :   Patient seen and examined during multidisciplinary rounds with RN, charge nurse, RT, pharmacy, dietitian, and social service. Short of breath, on minimal exertion, but otherwise feels okay, he is currently on 95% FiO2 60 L high flow, he is unable to tolerate prone position, he uses BiPAP occasionally, he is agreeable to try it again, his saturation is in the mid 80s to low 90s, no nausea or vomiting, no fever overnight    Social History     Tobacco Use    Smoking status: Never Smoker    Smokeless tobacco: Never Used   Substance Use Topics    Alcohol use: Never     History reviewed. No pertinent family history. No results for input(s): PHART, MNN6PHC, PO2ART in the last 72 hours. MV Settings:     / / /FiO2 : 95 %           IV:   sodium chloride      dextrose         Vitals:  /78   Pulse 78   Temp 97.7 °F (36.5 °C) (Axillary)   Resp 26   Ht 6' (1.829 m)   Wt 242 lb 8.1 oz (110 kg)   SpO2 90%   BMI 32.89 kg/m²    Tmax:        Intake/Output Summary (Last 24 hours) at 8/20/2021 0848  Last data filed at 8/20/2021 0100  Gross per 24 hour   Intake 700 ml   Output 1300 ml   Net -600 ml       EXAM:  General: alert, cooperative, no distress  Head: normocephalic, atraumatic  Eyes:No gross abnormalities. ENT:  MMM no lesions  Neck:  supple and no masses  Chest : Good air movement, minimal bilateral rales, no wheezing, nontender, tympanic  Heart[de-identified] Heart sounds are normal.  Regular rate and rhythm without murmur, gallop or rub. ABD:  symmetric, soft, non-tender, no guarding or rebound  Musculoskeletal : no cyanosis, no clubbing and no edema  Neuro:  Grossly normal  Skin: No rashes or nodules noted.   Lymph node:  no cervical nodes  Urology: No Lugo   Psychiatric: appropriate    Medications:  Scheduled Meds:   insulin lispro  4 Units Subcutaneous TID WC    pantoprazole  40 mg Oral QAM AC    insulin glargine  25 Units Subcutaneous BID    insulin lispro  0-18 Units Subcutaneous TID WC    insulin lispro  0-9 Units Subcutaneous Nightly    dexamethasone  6 mg Intravenous BID    docusate sodium  100 mg Oral BID    polyethylene glycol  17 g Oral Daily    enoxaparin  30 mg Subcutaneous BID    sodium chloride flush  5-40 mL Intravenous 2 times per day    traZODone  100 mg Oral Nightly       PRN Meds:  guaiFENesin-codeine, sodium chloride flush, sodium chloride, ondansetron **OR** ondansetron, polyethylene glycol, acetaminophen **OR** acetaminophen, potassium chloride, magnesium sulfate, ipratropium-albuterol, hydrALAZINE, glucose, dextrose, glucagon (rDNA), dextrose    Results: reviewed by me   CBC:   Recent Labs     08/18/21 0525 08/19/21 0522 08/20/21  0526   WBC 13.4* 17.6* 19.1*   HGB 15.8 16.0 16.5   HCT 47.9 48.1 50.1   MCV 89.0 88.7 89.8    148 131     BMP:   Recent Labs     08/18/21  0525 08/19/21  0522 08/20/21  0526    136 138   K 4.1 4.3 5.2*   CL 98 98 100   CO2 27 24 29   BUN 19 17 18   CREATININE 0.80 0.64* 0.74     LIVER PROFILE:   Recent Labs     08/18/21  0525 08/19/21  0522 08/20/21  0526   AST 24 28 26   ALT 29 25 21   BILITOT 0.5 0.5 0.5   ALKPHOS 150* 170* 218*     PT/INR: No results for input(s): PROTIME, INR in the last 72 hours. APTT: No results for input(s): APTT in the last 72 hours. UA:No results for input(s): NITRITE, COLORU, PHUR, LABCAST, WBCUA, RBCUA, MUCUS, TRICHOMONAS, YEAST, BACTERIA, CLARITYU, SPECGRAV, LEUKOCYTESUR, UROBILINOGEN, BILIRUBINUR, BLOODU, GLUCOSEU, AMORPHOUS in the last 72 hours. Invalid input(s): Neha [place]    Cultures:  Negative so far  Films:  CXR reviewed by me and it showed bilateral groundglass infiltrate      Assessment:   This is a critically ill patient at risk of deterioration / death , needing close ICU monitoring and intervention due to below noted problems     · Severe acute hypoxic respiratory failure  · Severe COVID-19 infection   · Hyperglycemia  · Hypercoagulable state    Recommendations  · O2 via high flow nasal cannula alternate with BiPAP target sat 93 to 95%  · Encourage prone position again today, I had lengthy discussion with the patient this might be the best option to improve his oxygenation, if continues to worsen will consider intubation  · Continue dexamethasone  · Completed remdesivir treatment  · Target blood sugar 140-180, appreciate endocrinology  · Monitor urine output and renal function  · DVT prophylaxis  · meatneb TID   · Lasix 20 mg IV x1   · Continue to monitor in ICU, at risk of intubation, watch closely, discussed with the patient today         Due to the immediate potential for life-threatening deterioration due to acute hypoxic respiratory failure, I spent 39 minutes providing critical care. This time is excluding time spent performing procedures.               Electronically signed by Kathy Escobedo MD,  [facility] ,on 8/20/2021 at 8:48 AM

## 2021-08-20 NOTE — PROGRESS NOTES
Infectious Disease     Patient Name: Luis M Sage  Date: 8/20/2021  YOB: 1965  Medical Record Number: [id number]      COVID-19 pneumonia    95% 60L/Min        Review of Systems   Constitutional: Negative. Respiratory: Positive for cough and shortness of breath. Cardiovascular: Negative. Gastrointestinal: Negative. Physical Exam  Constitutional:       General: He is not in acute distress. Appearance: He is not ill-appearing. Cardiovascular:      Heart sounds: Normal heart sounds. No murmur heard. Pulmonary:      Effort: Pulmonary effort is normal.      Breath sounds: No wheezing, rhonchi or rales. Abdominal:      General: Abdomen is flat. There is no distension. Palpations: There is no mass. Tenderness: There is no abdominal tenderness. There is no guarding. Blood pressure (!) 147/109, pulse 92, temperature 98 °F (36.7 °C), temperature source Axillary, resp. rate 27, height 6' (1.829 m), weight 242 lb 8.1 oz (110 kg), SpO2 93 %. .   Lab Results   Component Value Date    WBC 19.1 (H) 08/20/2021    HGB 16.5 08/20/2021    HCT 50.1 08/20/2021    MCV 89.8 08/20/2021     08/20/2021     Lab Results   Component Value Date     08/20/2021    K 5.2 08/20/2021     08/20/2021    CO2 29 08/20/2021    BUN 18 08/20/2021    CREATININE 0.74 08/20/2021    GLUCOSE 180 08/20/2021    CALCIUM 8.9 08/20/2021      EXAMINATION: CHEST PORTABLE VIEW       CLINICAL HISTORY: Increasing short of breath. Correlate. Follow-up       COMPARISONS: August 10, 2021       FINDINGS:       Single  views of the chest is submitted.  The cardiac silhouette is enlarged   Pulmonary vascular unremarkable. Right sided trachea. Persistent multifocal to patchy glass infiltrates throughout the lung parenchyma.  No Pneumothoraces.                                                                                        Impression   PERSISTENT PATCHY BILATERAL MULTIFOCAL TO GLASS INFILTRATES THROUGHOUT THE LUNG PARENCHYMA           PLAN:    COVID-19 pneumonia      On dexamethasone Lovenox   completed  remdesivir  Given 1 dose of Actemra    On high flow O2

## 2021-08-20 NOTE — PROGRESS NOTES
Hospitalist Daily Progress Note  Name: Bhavin Case  Age: 61 y.o. Gender: male  CodeStatus: Full Code  Allergies: No Known Allergies    Chief Complaint:Shortness of Breath    Primary Care Provider: Swathi Ames DO  InpatientTreatment Team: Treatment Team: Attending Provider: Bernardo Holm DO; Consulting Physician: Lexie Georges DO; Consulting Physician: Sarina Scott MD; Consulting Physician: Valente Loera MD; Utilization Reviewer: Aruna Kinney RN; Registered Nurse: Gavino Harry RN; : Nuvia Lunsford RN; Unit Clerk: Yane Josue  Admission Date: 8/10/2021      Subjective: Patient seen evaluated bedside. Hypoxic and high flow O2 started on BiPAP. Patient initially very hesitant prone, extensive discussion at bedside about the necessity of providing patient agreeable to attempt with fentanyl and Precedex    Physical Exam  Constitutional:       Appearance: Normal appearance. He is not ill-appearing or diaphoretic. HENT:      Head: Normocephalic and atraumatic. Nose: Nose normal.      Mouth/Throat:      Mouth: Mucous membranes are moist.      Pharynx: Oropharynx is clear. Cardiovascular:      Rate and Rhythm: Normal rate. Heart sounds: No murmur heard. No friction rub. No gallop. Pulmonary:      Breath sounds: No wheezing, rhonchi or rales. Abdominal:      General: There is no distension. Tenderness: There is no abdominal tenderness. There is no guarding. Musculoskeletal:         General: No swelling or tenderness. Normal range of motion. Neurological:      General: No focal deficit present. Mental Status: He is alert and oriented to person, place, and time. Psychiatric:         Mood and Affect: Mood normal.         Thought Content:  Thought content normal.         Judgment: Judgment normal.         Review of Systems  14 point ROS reviewed negative except for as above  Medications:  Reviewed    Infusion Medications:    dexmedetomidine      sodium chloride      dextrose       Scheduled Medications:    insulin lispro  4 Units Subcutaneous TID WC    pantoprazole  40 mg Oral QAM AC    insulin glargine  25 Units Subcutaneous BID    insulin lispro  0-18 Units Subcutaneous TID WC    insulin lispro  0-9 Units Subcutaneous Nightly    dexamethasone  6 mg Intravenous BID    docusate sodium  100 mg Oral BID    polyethylene glycol  17 g Oral Daily    enoxaparin  30 mg Subcutaneous BID    sodium chloride flush  5-40 mL Intravenous 2 times per day    traZODone  100 mg Oral Nightly     PRN Meds: guaiFENesin-codeine, sodium chloride flush, sodium chloride, ondansetron **OR** ondansetron, polyethylene glycol, acetaminophen **OR** acetaminophen, potassium chloride, magnesium sulfate, ipratropium-albuterol, hydrALAZINE, glucose, dextrose, glucagon (rDNA), dextrose    Labs:   Recent Labs     08/18/21 0525 08/19/21  0522 08/20/21  0526   WBC 13.4* 17.6* 19.1*   HGB 15.8 16.0 16.5   HCT 47.9 48.1 50.1    148 131     Recent Labs     08/18/21  0525 08/19/21  0522 08/20/21  0526    136 138   K 4.1 4.3 5.2*   CL 98 98 100   CO2 27 24 29   BUN 19 17 18   CREATININE 0.80 0.64* 0.74   CALCIUM 8.5 8.4* 8.9     Recent Labs     08/18/21  0525 08/19/21  0522 08/20/21  0526   AST 24 28 26   ALT 29 25 21   BILITOT 0.5 0.5 0.5   ALKPHOS 150* 170* 218*     No results for input(s): INR in the last 72 hours. No results for input(s): [place] in the last 72 hours. Urinalysis:   No results found for: Emmanuel Rocker, BACTERIA, RBCUA, BLOODU, [address], [place] [address] [place] [address]    Radiology:   Most recent    Chest CT      WITH CONTRAST:No results found for this or any previous visit. WITHOUT CONTRAST: No results found for this or any previous visit. CXR      2-view: No results found for this or any previous visit.        Portable: Results for orders placed during the hospital encounter of 08/10/21    XR CHEST PORTABLE    Narrative  EXAMINATION: XR CHEST PORTABLE    CLINICAL HISTORY: SHORTNESS OF BREATH    COMPARISONS: None available. FINDINGS: Patient leaning to left. Osseous structures intact. Cardiopericardial silhouette enlarged. Ill-defined areas of increased opacity are found in the right upper and right lower lung, with air bronchogram in the right lower lung. Air bronchogram  increased opacity is also found in the left lower lung, and to lesser degree the left midlung zone. Impression  BILATERAL ATELECTASIS/PNEUMONIA. CARDIOMEGALY      Echo No results found for this or any previous visit. Assessment/Plan:    Active Hospital Problems    Diagnosis Date Noted    Pneumonia due to COVID-19 virus [U07.1, J12.82] 08/10/2021    New onset type 2 diabetes mellitus ([facility] [address] [E11.9]     Hypoxia [R09.02]      Acute hypoxic respiratory failure secondary to COVID-19 pneumonia: Status post remdesivir, Actemra. Continue dexamethasone D8. Wean O2 as tolerated, encourage proning. High flow O2 with fentanyl to assist with proning    New onset diabetes: Hemoglobin A1c 13.7. Endocrinology following. Target blood sugars 144-180. Glucose moderately well controlled    DVT prophylaxis per Covid protocol    Additional work up or/and treatment plan may be added today or then after based on clinical progression. I am managing a portion of pt care. Some medical issues are handled byother specialists. Additional work up and treatment should be done in out pt setting by pt PCP and other out pt providers. In addition to examining and evaluating pt, I spent additional time explaining care, normaland abnormal findings, and treatment plan. All of pt questions were answered. Counseling, diet and education were provided. Case will be discussed with nursing staff when appropriate. Family will be updated if and whenappropriate.       Electronically signed by Pascual Samuel DO on 8/20/2021 at 11:12 AM

## 2021-08-20 NOTE — PLAN OF CARE
Problem: Falls - Risk of:  Goal: Will remain free from falls  Description: Will remain free from falls  Outcome: Ongoing  Goal: Absence of physical injury  Description: Absence of physical injury  Outcome: Ongoing     Problem: Airway Clearance - Ineffective  Goal: Achieve or maintain patent airway  Outcome: Ongoing     Problem: Gas Exchange - Impaired  Goal: Absence of hypoxia  Outcome: Ongoing  Goal: Promote optimal lung function  Outcome: Ongoing     Problem: Breathing Pattern - Ineffective  Goal: Ability to achieve and maintain a regular respiratory rate  Outcome: Ongoing     Problem:  Body Temperature -  Risk of, Imbalanced  Goal: Ability to maintain a body temperature within defined limits  Outcome: Ongoing  Goal: Will regain or maintain usual level of consciousness  Outcome: Ongoing  Goal: Complications related to the disease process, condition or treatment will be avoided or minimized  Outcome: Ongoing     Problem: Isolation Precautions - Risk of Spread of Infection  Goal: Prevent transmission of infection  Outcome: Ongoing     Problem: Nutrition Deficits  Goal: Optimize nutritional status  Outcome: Ongoing     Problem: Risk for Fluid Volume Deficit  Goal: Maintain normal heart rhythm  Outcome: Ongoing  Goal: Maintain absence of muscle cramping  Outcome: Ongoing  Goal: Maintain normal serum potassium, sodium, calcium, phosphorus, and pH  Outcome: Ongoing     Problem: Loneliness or Risk for Loneliness  Goal: Demonstrate positive use of time alone when socialization is not possible  Outcome: Ongoing     Problem: Fatigue  Goal: Verbalize increase energy and improved vitality  Outcome: Ongoing     Problem: Patient Education: Go to Patient Education Activity  Goal: Patient/Family Education  Outcome: Ongoing     Problem: Skin Integrity:  Goal: Will show no infection signs and symptoms  Description: Will show no infection signs and symptoms  Outcome: Ongoing  Goal: Absence of new skin breakdown  Description:

## 2021-08-20 NOTE — PROGRESS NOTES
Progress Note  Date:2021       Room:[place] [address]  Patient Reema Issa     YOB: 1965     Age:56 y.o. Chief complaint uncontrolled diabetes new onset type 2 diabetes    Subjective    Subjective:  Symptoms:  He reports shortness of breath. Review of Systems   Constitutional: Positive for fatigue. Respiratory: Positive for shortness of breath. All other systems reviewed and are negative. Objective         Vitals Last 24 Hours:  TEMPERATURE:  Temp  Av.4 °F (36.9 °C)  Min: 98.3 °F (36.8 °C)  Max: 98.7 °F (37.1 °C)  RESPIRATIONS RANGE: Resp  Av.9  Min: 13  Max: 32  PULSE OXIMETRY RANGE: SpO2  Av.3 %  Min: 84 %  Max: 93 %  PULSE RANGE: Pulse  Av.1  Min: 59  Max: 90  BLOOD PRESSURE RANGE: Systolic (05BPU), RUX:572 , Min:111 , QUM:780   ; Diastolic (25UNM), SGX:04, Min:56, Max:99    I/O (24Hr): Intake/Output Summary (Last 24 hours) at 2021 2303  Last data filed at 2021 1704  Gross per 24 hour   Intake 700 ml   Output 1450 ml   Net -750 ml     Objective:  General Appearance:  Ill-appearing. Vital signs: (most recent): Blood pressure 135/79, pulse 80, temperature 98.4 °F (36.9 °C), temperature source Oral, resp. rate (!) 32, height 6' (1.829 m), weight 246 lb 11.1 oz (111.9 kg), SpO2 91 %. Vital signs are normal.      Labs/Imaging/Diagnostics    Labs:  CBC:  Recent Labs     21   WBC 12.5* 13.4* 17.6*   RBC 5.25 5.39 5.42   HGB 15.4 15.8 16.0   HCT 46.6 47.9 48.1   MCV 88.8 89.0 88.7   RDW 13.2 13.3 13.4    179 148     CHEMISTRIES:  Recent Labs     21  0521    138 136   K 3.9 4.1 4.3   CL 98 98 98   CO2 25 27 24   BUN 21* 19 17   CREATININE 0.85 0.80 0.64*   GLUCOSE 238* 172* 130*     PT/INR:No results for input(s): PROTIME, INR in the last 72 hours. APTT:No results for input(s): APTT in the last 72 hours.   LIVER PROFILE:  Recent Labs     21  0536 08/18/21  0525 08/19/21  0522   AST 28 24 28   ALT 32 29 25   BILITOT 0.3 0.5 0.5   ALKPHOS 149* 150* 170*       Imaging Last 24 Hours:  XR CHEST PORTABLE    Result Date: 8/18/2021  EXAMINATION: CHEST PORTABLE VIEW  CLINICAL HISTORY: Increasing short of breath. Correlate. Follow-up COMPARISONS: August 10, 2021  FINDINGS: Single  views of the chest is submitted. The cardiac silhouette is enlarged Pulmonary vascular unremarkable. Right sided trachea. Persistent multifocal to patchy glass infiltrates throughout the lung parenchyma. No Pneumothoraces. PERSISTENT PATCHY BILATERAL MULTIFOCAL TO GLASS INFILTRATES THROUGHOUT THE LUNG PARENCHYMA    Assessment//Plan           Hospital Problems         Last Modified POA    Pneumonia due to COVID-19 virus 8/17/2021 Yes    New onset type 2 diabetes mellitus ([facility] [address] 8/10/2021 Yes    Hypoxia 8/10/2021 Yes        Assessment:    Condition: In serious condition. Improving. (Uncontrolled type 2 diabetes overall blood sugars improving  Pneumonia due to COVID-19  Hypoxia stable improving). Plan:   (Continue Lantus 25 units at bedtime Humalog low to 4 units with each meals  Monitor glycemic control closely  Seen patient through glass window  Virtual visit due to COVID-19 protocol  Total time spent 15 minutes).        Electronically signed by Leny Hernandez MD on 8/19/21 at 11:03 PM EDT

## 2021-08-21 NOTE — FLOWSHEET NOTE
0800 Assessment complete, see flow sheet. Patient A/O X4, following commands, moving all extremities well. Taken off of BIPAP and placed on high flow 60L 100% by resp therapy, does get increasingly SOB with any exertion. Refuses breakfast at this time. Resting with eyes closed. 0930 SPO2 noted to be in the low 80%, Dr Teresa Costello at bedside. Patient return to BIPAP 14/7 100% by resp therapy with no improvement of SPO2. BIPAP increased to 18/12 100% per order of Dr Teresa Costello, SPO2 increased to the high 80's. Will cont to monitor. 1000 Patient C/O of pain in lower right rib cage area, Dr Teresa Costello at bedside. Medicated with Toradol as ordered, also increased BIPAP 18/13 100% per Dr Teresa Costello. SPO2 low 90's. Patient resting at present time. 1330 Patient resting well at present time. Denies pain and or discomfort, tolerating BIPAP well. No changes in assessment noted. 1730 Attempted to place patient on High flow for dinner, tolerated for about 10 min, than SPO2 decreased to the low to mid 80's. Patient placed back on BIPAP and christ well. No changes noted in assessment.

## 2021-08-21 NOTE — PROGRESS NOTES
Hospitalist Daily Progress Note  Name: Willi Beal  Age: 61 y.o. Gender: male  CodeStatus: Full Code  Allergies: No Known Allergies    Chief Complaint:Shortness of Breath    Primary Care Provider: Terri Sepulveda DO  InpatientTreatment Team: Treatment Team: Attending Provider: Tracy Rinne, DO; Consulting Physician: Halina Cheney DO; Consulting Physician: Satinder Zee MD; Consulting Physician: Gregory Finley MD; Utilization Reviewer: Dagoberto Horta RN; : Leonel Mckeon; Registered Nurse: Sharmaine Santos RN; Utilization Reviewer: Catherine Yancey RN  Admission Date: 8/10/2021      Subjective: Patient seen evaluated bedside. Hypoxia despite maximum heated high flow settings. Pt on precedex 0.6mcg/kg/hr. pt agreeable to BIPAP. Unable to prone due to shoulder discomfort and cough. Physical Exam  Constitutional:       Appearance: Normal appearance. He is not ill-appearing or diaphoretic. HENT:      Head: Normocephalic and atraumatic. Nose: Nose normal.      Mouth/Throat:      Mouth: Mucous membranes are moist.      Pharynx: Oropharynx is clear. Cardiovascular:      Rate and Rhythm: Normal rate. Heart sounds: No murmur heard. No friction rub. No gallop. Pulmonary:      Breath sounds: No wheezing, rhonchi or rales. Abdominal:      General: There is no distension. Tenderness: There is no abdominal tenderness. There is no guarding. Musculoskeletal:         General: No swelling or tenderness. Normal range of motion. Neurological:      General: No focal deficit present. Mental Status: He is alert and oriented to person, place, and time. Psychiatric:         Mood and Affect: Mood normal.         Thought Content:  Thought content normal.         Judgment: Judgment normal.         Review of Systems  14 point ROS reviewed negative except for as above  Medications:  Reviewed    Infusion Medications:    dexmedetomidine 0.6 mcg/kg/hr (08/20/21 7736)    sodium chloride      dextrose       Scheduled Medications:    insulin glargine  30 Units Subcutaneous BID    insulin lispro  8 Units Subcutaneous TID WC    pantoprazole  40 mg Oral QAM AC    insulin lispro  0-18 Units Subcutaneous TID WC    insulin lispro  0-9 Units Subcutaneous Nightly    dexamethasone  6 mg Intravenous BID    docusate sodium  100 mg Oral BID    polyethylene glycol  17 g Oral Daily    enoxaparin  30 mg Subcutaneous BID    sodium chloride flush  5-40 mL Intravenous 2 times per day    traZODone  100 mg Oral Nightly     PRN Meds: guaiFENesin-codeine, sodium chloride flush, sodium chloride, ondansetron **OR** ondansetron, polyethylene glycol, acetaminophen **OR** acetaminophen, potassium chloride, magnesium sulfate, ipratropium-albuterol, hydrALAZINE, glucose, dextrose, glucagon (rDNA), dextrose    Labs:   Recent Labs     08/19/21 0522 08/20/21  0526   WBC 17.6* 19.1*   HGB 16.0 16.5   HCT 48.1 50.1    131     Recent Labs     08/19/21 0522 08/20/21  0526    138   K 4.3 5.2*   CL 98 100   CO2 24 29   BUN 17 18   CREATININE 0.64* 0.74   CALCIUM 8.4* 8.9     Recent Labs     08/19/21 0522 08/20/21  0526   AST 28 26   ALT 25 21   BILITOT 0.5 0.5   ALKPHOS 170* 218*     No results for input(s): INR in the last 72 hours. No results for input(s): V[place] in the last 72 hours. Urinalysis:   No results found for: Britt Arrow, BACTERIA, RBCUA, BLOODU, [address], [place] [address]    Radiology:   Most recent    Chest CT      WITH CONTRAST:No results found for this or any previous visit. WITHOUT CONTRAST: No results found for this or any previous visit. CXR      2-view: No results found for this or any previous visit. Portable: Results for orders placed during the hospital encounter of 08/10/21    XR CHEST PORTABLE    Narrative  EXAMINATION: XR CHEST PORTABLE    CLINICAL HISTORY: SHORTNESS OF BREATH    COMPARISONS: None available. FINDINGS: Patient leaning to left.  Osseous structures intact. Cardiopericardial silhouette enlarged. Ill-defined areas of increased opacity are found in the right upper and right lower lung, with air bronchogram in the right lower lung. Air bronchogram  increased opacity is also found in the left lower lung, and to lesser degree the left midlung zone. Impression  BILATERAL ATELECTASIS/PNEUMONIA. CARDIOMEGALY      Echo No results found for this or any previous visit. Assessment/Plan:    Active Hospital Problems    Diagnosis Date Noted    Pneumonia due to COVID-19 virus [U07.1, J12.82] 08/10/2021    [facility] [E11.9]     Hypoxia [R09.02]      Acute hypoxic respiratory failure secondary to COVID-19 pneumonia: Status post remdesivir, Actemra. Continue dexamethasone D9. BIPAP to maintain sat ~90. Close monitoring, may need intubation. Continue precedex as HR allows. New onset diabetes: Hemoglobin A1c 13.7. Endocrinology following. Target blood sugars 144-180. Glucose moderately well controlled    DVT prophylaxis per Covid protocol    Additional work up or/and treatment plan may be added today or then after based on clinical progression. I am managing a portion of pt care. Some medical issues are handled byother specialists. Additional work up and treatment should be done in out pt setting by pt PCP and other out pt providers. In addition to examining and evaluating pt, I spent additional time explaining care, normaland abnormal findings, and treatment plan. All of pt questions were answered. Counseling, diet and education were provided. Case will be discussed with nursing staff when appropriate. Family will be updated if and whenappropriate.       Electronically signed by Carlos Enrique Lewis DO on 8/21/2021 at 9:26 AM

## 2021-08-21 NOTE — PROGRESS NOTES
Pulmonary & Critical Care Medicine ICU Progress Note  Chief complaint : Severe acute hypoxic respiratory failure    Subjunctive/24 hour events :   Patient seen and examined during multidisciplinary rounds with RN, charge nurse, RT, pharmacy, dietitian, and social service. He spent the night on BiPAP, feels better, is currently on Precedex to control his anxiety and improve compliance with BiPAP and treatment, is now on 100% FiO2, and 60 L flow saturation 91%, no vomiting, no bowel movement, urine output 2200 cc. Social History     Tobacco Use    Smoking status: Never Smoker    Smokeless tobacco: Never Used   Substance Use Topics    Alcohol use: Never     History reviewed. No pertinent family history. No results for input(s): PHART, BSU1IHE, PO2ART in the last 72 hours. MV Settings:     / / /FiO2 : 100 %           IV:   dexmedetomidine 0.6 mcg/kg/hr (08/20/21 1356)    sodium chloride      dextrose         Vitals:  /71   Pulse 51   Temp 98.2 °F (36.8 °C) (Axillary)   Resp 24   Ht 6' (1.829 m)   Wt 242 lb 8.1 oz (110 kg)   SpO2 91%   BMI 32.89 kg/m²    Tmax:        Intake/Output Summary (Last 24 hours) at 8/21/2021 4798  Last data filed at 8/20/2021 2000  Gross per 24 hour   Intake 624 ml   Output 1800 ml   Net -1176 ml       EXAM:  General: Alert, answers questions no apparent distress  Head: normocephalic, atraumatic  Eyes:No gross abnormalities. ENT:  MMM no lesions  Neck:  supple and no masses  Chest : Good air movement, bilateral rales, no wheezing, nontender, tympanic  Heart[de-identified] Heart sounds are normal.  Regular rate and rhythm without murmur, gallop or rub. ABD:  symmetric, soft, non-tender, no guarding or rebound  Musculoskeletal : no cyanosis, no clubbing and no edema  Neuro:  Grossly normal  Skin: No rashes or nodules noted.   Lymph node:  no cervical nodes  Urology: No [place]   Psychiatric: appropriate    Medications:  Scheduled Meds:   insulin glargine  30 Units Subcutaneous BID    insulin lispro  8 Units Subcutaneous TID WC    pantoprazole  40 mg Oral QAM AC    insulin lispro  0-18 Units Subcutaneous TID WC    insulin lispro  0-9 Units Subcutaneous Nightly    dexamethasone  6 mg Intravenous BID    docusate sodium  100 mg Oral BID    polyethylene glycol  17 g Oral Daily    enoxaparin  30 mg Subcutaneous BID    sodium chloride flush  5-40 mL Intravenous 2 times per day    traZODone  100 mg Oral Nightly       PRN Meds:  ketorolac, guaiFENesin-codeine, sodium chloride flush, sodium chloride, ondansetron **OR** ondansetron, polyethylene glycol, acetaminophen **OR** acetaminophen, potassium chloride, magnesium sulfate, ipratropium-albuterol, hydrALAZINE, glucose, dextrose, glucagon (rDNA), dextrose    Results: reviewed by me   CBC:   Recent Labs     08/19/21 0522 08/20/21 0526   WBC 17.6* 19.1*   HGB 16.0 16.5   HCT 48.1 50.1   MCV 88.7 89.8    131     BMP:   Recent Labs     08/19/21 0522 08/20/21 0526    138   K 4.3 5.2*   CL 98 100   CO2 24 29   BUN 17 18   CREATININE 0.64* 0.74     LIVER PROFILE:   Recent Labs     08/19/21 0522 08/20/21  0526   AST 28 26   ALT 25 21   BILITOT 0.5 0.5   ALKPHOS 170* 218*     PT/INR: No results for input(s): PROTIME, INR in the last 72 hours. APTT: No results for input(s): APTT in the last 72 hours. UA:No results for input(s): NITRITE, COLORU, PHUR, LABCAST, WBCUA, RBCUA, MUCUS, TRICHOMONAS, YEAST, BACTERIA, CLARITYU, SPECGRAV, LEUKOCYTESUR, UROBILINOGEN, BILIRUBINUR, BLOODU, GLUCOSEU, AMORPHOUS in the last 72 hours. Invalid input(s): Samara Paddy    Cultures:  Negative so far  Films:  CXR reviewed by me and it showed bilateral groundglass infiltrate      Assessment:   This is a critically ill patient at risk of deterioration / death , needing close ICU monitoring and intervention due to below noted problems     · Severe acute hypoxic respiratory failure  · Severe COVID-19 infection   · Hyperglycemia  · Hypercoagulable state    Recommendations  · O2 via high flow nasal cannula alternate with BiPAP target sat 93 to 95%  · Encourage prone position, might need intubation  · Continue dexamethasone  · Completed remdesivir treatment  · Target blood sugar 140-180, appreciate endocrinology  · Monitor urine output and renal function  · DVT prophylaxis  · meatneb TID   · Repeat procalcitonin  · Repeat electrolytes and CBC today           Due to the immediate potential for life-threatening deterioration due to acute hypoxic respiratory failure, I spent 35 minutes providing critical care. This time is excluding time spent performing procedures.               Electronically signed by Nneka England MD,  [facility] ,on 8/21/2021 at 9:36 AM

## 2021-08-22 NOTE — PROGRESS NOTES
Pulmonary & Critical Care Medicine ICU Progress Note  Chief complaint : Severe acute hypoxic respiratory failure    Subjunctive/24 hour events :   Patient seen and examined during multidisciplinary rounds with RN, charge nurse, RT, pharmacy, dietitian, and social service. BiPAP dependent throughout the night, worsening shortness of breath, 100% FiO2, spoke with the patient today, not getting better, progressively worse, elected to intubate and patient is agreeable he was intubated this morning central line and arterial line placed, continues to have fever temperature 39.8, urine output 2300 cc. Social History     Tobacco Use    Smoking status: Never Smoker    Smokeless tobacco: Never Used   Substance Use Topics    Alcohol use: Never     History reviewed. No pertinent family history. Recent Labs     08/22/21  1025   PHART 7.247*   XWI6COA 62*   PO2ART 86*       MV Settings:  Vent Mode: AC/VC Rate Set: 20 bmp/Vt Ordered: 470 mL/ /FiO2 : 100 %           IV:   sodium chloride      lactated ringers      fentaNYL (SUBLIMAZE) infusion      dexmedetomidine 0.6 mcg/kg/hr (08/22/21 0833)    sodium chloride      dextrose         Vitals:  BP 87/65   Pulse 57   Temp 103.6 °F (39.8 °C) (Axillary)   Resp 21   Ht 6' (1.829 m)   Wt 242 lb 8.1 oz (110 kg)   SpO2 94%   BMI 32.89 kg/m²    Tmax:        Intake/Output Summary (Last 24 hours) at 8/22/2021 1102  Last data filed at 8/22/2021 0600  Gross per 24 hour   Intake 529 ml   Output 2300 ml   Net -1771 ml       EXAM:  General: Alert on BiPAP, answers questions, later intubated and sedated  Head: normocephalic, atraumatic  Eyes:No gross abnormalities. ENT:  MMM no lesions, on BiPAP initially later ET tube and OG tube  Neck:  supple and no masses  Chest : Good air movement, bilateral rales, no wheezing, nontender, tympanic  Heart[de-identified] Heart sounds are normal.  Regular rate and rhythm without murmur, gallop or rub.   ABD:  symmetric, soft, non-tender, no guarding or rebound  Musculoskeletal : no cyanosis, no clubbing and no edema  Neuro:  Awake follows commands moves all 4 extremities  Skin: No rashes or nodules noted. Lymph node:  no cervical nodes  Urology: No Lugo   Psychiatric: appropriate    Medications:  Scheduled Meds:   propofol        etomidate        succinylcholine chloride        midazolam        fentaNYL        etomidate        fentaNYL        propofol        insulin glargine  15 Units Subcutaneous BID    insulin lispro  0-6 Units Subcutaneous TID WC    insulin lispro  0-3 Units Subcutaneous Nightly    insulin lispro  4 Units Subcutaneous TID WC    pantoprazole  40 mg Oral QAM AC    dexamethasone  6 mg Intravenous BID    docusate sodium  100 mg Oral BID    polyethylene glycol  17 g Oral Daily    enoxaparin  30 mg Subcutaneous BID    sodium chloride flush  5-40 mL Intravenous 2 times per day    traZODone  100 mg Oral Nightly       PRN Meds:  ketorolac, guaiFENesin-codeine, sodium chloride flush, sodium chloride, ondansetron **OR** ondansetron, polyethylene glycol, acetaminophen **OR** acetaminophen, potassium chloride, magnesium sulfate, ipratropium-albuterol, hydrALAZINE, glucose, dextrose, glucagon (rDNA), dextrose    Results: reviewed by me   CBC:   Recent Labs     08/20/21  0526 08/20/21  0526 08/21/21  1307 08/22/21  0539 08/22/21  1025   WBC 19.1*  --  20.0* 18.9*  --    HGB 16.5   < > 16.1 15.4 15.5   HCT 50.1  --  49.1 46.4  --    MCV 89.8  --  88.7 89.2  --      --  121* 133  --     < > = values in this interval not displayed. BMP:   Recent Labs     08/20/21  0526 08/20/21  0526 08/21/21  1307 08/22/21  0539 08/22/21  1025     --  135  134* 136  --    K 5.2*   < > 4.3  4.1 4.8  4.8  --      --  98  98 99  --    CO2 29  --  25  23 22  --    PHOS  --   --  6.3* 5.5*  --    BUN 18  --  31*  31* 35*  --    CREATININE 0.74   < > 0.75  0.73 0.74 1.1    < > = values in this interval not displayed.      LIVER PROFILE:   Recent Labs     08/20/21  0526 08/21/21  1307 08/22/21  0539   AST 26 24 25   ALT 21 16 14   BILITOT 0.5 0.5 0.5   ALKPHOS 218* 230* 190*     PT/INR: No results for input(s): PROTIME, INR in the last 72 hours. APTT: No results for input(s): APTT in the last 72 hours. UA:No results for input(s): NITRITE, COLORU, PHUR, LABCAST, WBCUA, RBCUA, MUCUS, TRICHOMONAS, YEAST, BACTERIA, CLARITYU, SPECGRAV, LEUKOCYTESUR, UROBILINOGEN, BILIRUBINUR, BLOODU, GLUCOSEU, AMORPHOUS in the last 72 hours. Invalid input(s): Samara Paddy    Cultures:  Negative so far  Films:  CXR reviewed by me and it showed bilateral groundglass infiltrate      Assessment: This is a critically ill patient at risk of deterioration / death , needing close ICU monitoring and intervention due to below noted problems     · Severe acute hypoxic respiratory failure  · Severe COVID-19 infection   · Hyperglycemia  · Hypercoagulable state    Recommendations  · Vent support lung protective strategy  · head of the bed 30°  · Daily sedation holidays and breathing trials  · Sedation with combination propofol and fentanyl target R ASS of 0 to -1  · Watch for ICU delirium: TV on, natural light, avoid benzos, pain control, early mobility, and family engagement  · PUD prophylaxis  · DVT prophylaxis  · Prone position   · Continue dexamethasone  · resp cultures  · procalcitonin   · emperic Zosyn   · Completed remdesivir treatment  · Target blood sugar 140-180, appreciate endocrinology  · Monitor urine output and renal function  · DVT prophylaxis  · meatneb TID            Due to the immediate potential for life-threatening deterioration due to acute hypoxic respiratory failure, I spent 39 minutes providing critical care. This time is excluding time spent performing procedures.               Electronically signed by Tyra Dugan MD,  [facility] ,on 8/22/2021 at 11:02 AM

## 2021-08-22 NOTE — CARE COORDINATION
Rounds done with nurse outside of room. Per resp therapy Dr. Lynn Bears plans to intubate pt and place on vent today. Continue to follow for future needs/plan.

## 2021-08-22 NOTE — FLOWSHEET NOTE
0745 Assessment complete, see flow sheet  Patient A/O x4, following all commands, moving all extremities well. Remains on BIPAP 18/13 100% increased SOB with any exertion. Resting at present time. PCXR done per order. Patient seen by Dr Brad Childress. 2646 Consent for CVC, MINH and Intubation signed per order. [id number] Patient intubated with 7.5 tube, right CVC line inserted, and right radial [place] inserted per Dr. Brad Childress. OG and carroll also inserted. PCXR done and verified placement of all per Dr Brad Childress, [facility] to use, carroll draining clear yellow urine. 1300 Patient suctioned, mouth care done, repositioned for comfort. No changes in assessment noted. 1400 To CT scan via bed in stable condition. 1445 Returned from CT scan. No changes in assessment noted. 1650 Nibex started per order, See MAR and train of 4. Alonzo wrist restraints DCed . 1700 Patient placed in the Prone positioned per order. All monitors and IVs and lines working well and unkinked. No changes in assessment noted.   1800 Train of four 0/0 Nimbex decreased as ordered,

## 2021-08-22 NOTE — PROCEDURES
PROCEDURE:   Endotracheal intubation. INDICATIONS:   Acute Respiratory Failure. Consent   The patient and his son provided verbal consent for this procedure. PROCEDURE SUMMARY:   Permit was implied secondary to emergent situation. A glide scope blade  was inserted into the oropharynx at which time the vocal cords were visualized. 7.5 Romanian endotracheal tube was inserted and visualized going through the vocal cords. The stylette was removed. Colorimetric change was visualized on the CO2 meter. Breath sounds were heard in both lung fields equally. The endotracheal tube was placed at 24 cm, measured at the teeth. COMPLICATIONS:    None    ESTIMATED BLOOD LOSS:   None      Internal jugular central venous catheter; Ultrasound guided. [id number]                                                             [id number]    INDICATION:    Need for central access critical patient      CONSENT:  The patient was and and his son counseled regarding the procedure, it's indications, risks, potential complications and alternatives and any questions were answered. Consent was obtained. PROCEDURE SUMMARY:   A time-out was performed. The patient's right neck region was prepped and draped in sterile fashion. The right internal jugular vein was accessed under ultrasound guidance using a finder needle and sheath. U/S images were permanently documented. Anesthesia was achieved with 1% lidocaine. The finder needle was inserted into the right neck under direct ultrasound guidance, and venous blood was withdrawn. The introducer needle was then inserted under direct ultrasound guidance and venous blood was withdrawn into the syringe. The syringe was removed and the guidewire was advanced through the introducer needle. A small incision was made with a scalpel and the introducer needle was removed. A dilator was advanced over the guidewire until appropriate dilation was obtained.  The dilator was removed and an central venous  catheter was advanced over the guidewire and secured into place with 2 sutures at 16 cm. At time of procedure completion, all ports aspirated and flushed properly. Estimated Blood loss   less than 5 cc    COMPLICATIONS:  None        PROCEDURE:   Radial artery line placement. (A-line)  [id number]    INDICATION:       CONSENT: The patient was and and his son counseled regarding the procedure, it's indications, risks, potential complications and alternatives and any questions were answered. Consent was obtained. nt. PROCEDURE SUMMARY:   Radial arteries were assessed by palpation and ultrasound localization. Mico Luna test was done to asses collateral circulation. The patient was prepped and draped in the usual sterile manner using chlorhexidine scrub. 1% lidocaine was used to numb the region. The right  radial artery was palpated and successfully cannulated on the first pass. Pulsatile, arterial blood was visualized and the artery was then threaded using the Seldinger technique and a catheter was then sutured into place. Good wave-form was obtained. The patient tolerated the procedure well without any immediate complications. The area was cleaned and Tegaderm was applied. ESTIMATED BLOOD LOSS:   Minimal    COMPLICATIONS:  No immediate complication            Juventino Gama MD 11:16 AM 8/22/2021 .

## 2021-08-23 NOTE — PROGRESS NOTES
0700: Assumed care. Report received. 0800: Medications given, assessed. See MAR and NPR for details. Patient remains prone. Thorough assessment to be completed once patient repositioned. 0900: Interdisciplinary rounds performed. 1100: Patient returned to supine position, assessment performed see chart for details. 1200: Arterial line placed. Meds given see Mar for details. Restraints replaced for continued patient safety.

## 2021-08-23 NOTE — PROGRESS NOTES
Comprehensive Nutrition Assessment    Type and Reason for Visit:  Reassess, Consult (TF order and manage)    Nutrition Recommendations/Plan:   Modify Tube Feeding   Change TF to Renal  ( Nepro) due to elevated K + and phosphorus. Add protien modular x 2 with AM water flush to aid in meeting protein needs  Continue with water flush 100 ml, every 6 hrs  Monitor proning schedule    Nutrition Assessment:  Pt meeting criteria for moderate manutrition related to acute illness, LOS # 13, now intubated and most recent weight shows 5% wt loss since admission. Pt on proning schedule, will modify TF    Malnutrition Assessment:  Malnutrition Status: Moderate malnutrition    Context:  Acute Illness     Findings of the 6 clinical characteristics of malnutrition:  Energy Intake:  7 - 50% or less of estimated energy requirements for 5 or more days  Weight Loss:  1 - 5% over 1 month     Body Fat Loss:  Unable to assess     Muscle Mass Loss:  Unable to assess    Fluid Accumulation:  Unable to assess     Strength:  Not Performed    Estimated Daily Nutrient Needs:  Energy (kcal):  2952-6253 kcals @ 11-14 kca;/kg; Weight Used for Energy Requirements:  Current (110 kg)     Protein (g):  ~ 162 g protein @ 2 g/kg IBW; Weight Used for Protein Requirements:  Ideal (81 kg)        Fluid (ml/day):  ~2268 ml @ 28 m/l/kg IBW; Method Used for Fluid Requirements:  ml/Kg (81 kg)      Nutrition Related Findings:  intubated 8/22, OG placed and trophic TF initiated.  tolerated although still no BM ( 8/18) on colace and glycolax, being transitioned from propofol ( @ 33 = ~ 875 kcals) to precedex, fentanyl, nimbex, worsening renal labs, K = 5.9, phos= 5.5, BUN =73, creat wnl, glucose 170-259, , I/0 1932/2500, + febrile,      Wounds:  None       Current Nutrition Therapies:    · Current Tube Feeding (TF) Orders:   · Feeding Route: Orogastric  · Formula: Diabetic  · Schedule: Continuous @ 20 ml/hr  ·  Additives/Modulars: Protein (x2 = 208 kcals, , 52 g protein)  · Water Flushes: 100 ml every 6 hrs  Current TF & Flush Orders Provides:720 kcals, 40 g protien, 764 ml free water, 1209 mg K, 480 ml phosphorus Goal TF & Flush Orders Provides:renal TF @ 20 + 2 protein shots = 1072 kcals,  91 g protein, 750 ml free water, 455 mg K, 344 mg phophorus   Anthropometric Measures:  · Height: 6' (182.9 cm)  · Current Body Weight: 242 lb (109.8 kg)   · Admission Body Weight: 254 lb (115.2 kg)    · Usual Body Weight:  (n/n, no EMR wts)     · Ideal Body Weight: 178 lbs;  · BMI: 32.8  · BMI Categories: Obese Class 1 (BMI 30.0-34. 9)       Nutrition Diagnosis:   · Altered nutrition-related lab values related to renal dysfunction, endocrine dysfuntion as evidenced by lab values    · Inadequate oral intake related to impaired respiratory function as evidenced by NPO or clear liquid status due to medical condition, intubation    · Moderate malnutrition, In context of acute illness or injury related to increase demand for energy/nutrients as evidenced by weight loss greater than or equal to 5% in 1 month, intake 26-50%    Nutrition Interventions:   Food and/or Nutrient Delivery:  Modify Tube Feeding (Change TF to Renal due to elevated K + and phosphorus. Add protien modular x 2 with AM water flush)  Nutrition Education/Counseling:  Education needed   Coordination of Nutrition Care:  Continue to monitor while inpatient    Goals:  New goals : En to deliver kcals/protien within range of estimated needs, iimproved renal labs/glucose       Nutrition Monitoring and Evaluation:     Food/Nutrient Intake Outcomes:  Enteral Nutrition Intake/Tolerance  Physical Signs/Symptoms Outcomes:  Biochemical Data, Constipation, GI Status, Hemodynamic Status, Weight, Fluid Status or Edema     Discharge Planning:     Too soon to determine     Electronically signed by Deanna Christopher RD, LD on 8/23/21 at 2:15 PM EDT

## 2021-08-23 NOTE — PROGRESS NOTES
PRN  ondansetron (ZOFRAN-ODT) disintegrating tablet 4 mg, 4 mg, Oral, Q8H PRN **OR** ondansetron (ZOFRAN) injection 4 mg, 4 mg, Intravenous, Q6H PRN  polyethylene glycol (GLYCOLAX) packet 17 g, 17 g, Oral, Daily PRN  acetaminophen (TYLENOL) tablet 650 mg, 650 mg, Oral, Q6H PRN **OR** acetaminophen (TYLENOL) suppository 650 mg, 650 mg, Rectal, Q6H PRN  potassium chloride 10 mEq/100 mL IVPB (Peripheral Line), 10 mEq, Intravenous, PRN  magnesium sulfate 2000 mg in 50 mL IVPB premix, 2,000 mg, Intravenous, PRN  ipratropium-albuterol (DUONEB) nebulizer solution 1 ampule, 1 ampule, Inhalation, Q4H PRN  hydrALAZINE (APRESOLINE) injection 10 mg, 10 mg, Intravenous, Q6H PRN  traZODone (DESYREL) tablet 100 mg, 100 mg, Oral, Nightly  glucose (GLUTOSE) 40 % oral gel 15 g, 15 g, Oral, PRN  dextrose 50 % IV solution, 12.5 g, Intravenous, PRN  glucagon (rDNA) injection 1 mg, 1 mg, Intramuscular, PRN  dextrose 5 % solution, 100 mL/hr, Intravenous, PRN  Physical    VITALS:  /60   Pulse 56   Temp 102.2 °F (39 °C) (Bladder)   Resp 23   Ht 6' (1.829 m)   Wt 242 lb 8.1 oz (110 kg)   SpO2 94%   BMI 32.89 kg/m²   Constitutional: intubated and sedated  Head: Normocephalic, atraumatic  Lungs: no tachypnea, on the ventilator  Heart: tachycardic on tele  GI: obese     Data    CBC:   Lab Results   Component Value Date    WBC 16.7 08/23/2021    RBC 5.38 08/23/2021    HGB 16.2 08/23/2021    HCT 47.6 08/23/2021    MCV 88.3 08/23/2021    MCH 28.9 08/23/2021    MCHC 32.7 08/23/2021    RDW 13.2 08/23/2021     08/23/2021     CMP:    Lab Results   Component Value Date     08/23/2021    K 5.9 08/23/2021    K 5.9 08/23/2021     08/23/2021    CO2 25 08/23/2021    BUN 37 08/23/2021    CREATININE 1.5 08/23/2021    CREATININE 0.97 08/23/2021    GFRAA 58 08/23/2021    LABGLOM 48 08/23/2021    GLUCOSE 259 08/23/2021    PROT 6.1 08/23/2021    LABALBU 2.8 08/23/2021    CALCIUM 8.4 08/23/2021    BILITOT 0.3 08/23/2021    ALKPHOS 183 08/23/2021    AST 20 08/23/2021    ALT 15 08/23/2021       ASSESSMENT AND PLAN      # Acute hypoxic respiratory failure 2/2 COVID-19 pneumonia  - was intubated on 8/22  - sedated, paralyzed. Prone as tolerated  - critical care consulted  - completed course of remdesivir, actemra  - continuing decadron, lovenox  - ID consulted  - empiric zosyn    # New onset diabetes  - cont lantus, ISS  - endocrinology consulted    DVT: lovenox per covid protocol    Disposition: Pt continues to be on high vent settings. Critical care managing. Continuing decadron, zosyn. ID consulted.        Carmen Byers,   Internal Medicine

## 2021-08-23 NOTE — PROGRESS NOTES
Infectious Disease     Patient Name: Eliana Noland  Date: 8/23/2021  YOB: 1965  Medical Record Number: [id number]      COVID-19 pneumonia      Worsened requiring intubation mechanical ventilation found to have pulmonary emboli            Review of Systems   Unable to perform ROS: Intubated       Prone ventilation   Physical Exam  Constitutional:       General: He is in acute distress. Appearance: He is ill-appearing. Cardiovascular:      Heart sounds: Normal heart sounds. No murmur heard. Pulmonary:      Effort: Pulmonary effort is normal.      Breath sounds: No wheezing, rhonchi or rales. Abdominal:      General: Abdomen is flat. There is no distension. Palpations: There is no mass. Tenderness: There is no abdominal tenderness. There is no guarding. Blood pressure 110/63, pulse 63, temperature 102.2 °F (39 °C), temperature source Bladder, resp. rate (!) 31, height 6' (1.829 m), weight 242 lb 8.1 oz (110 kg), SpO2 92 %. .   Lab Results   Component Value Date    WBC 16.7 (H) 08/23/2021    HGB 15.3 08/23/2021    HCT 47.6 08/23/2021    MCV 88.3 08/23/2021     08/23/2021     Lab Results   Component Value Date     08/23/2021    K 5.9 08/23/2021    K 5.9 08/23/2021     08/23/2021    CO2 25 08/23/2021    BUN 37 08/23/2021    CREATININE 1.6 08/23/2021    CREATININE 0.97 08/23/2021    GLUCOSE 259 08/23/2021    CALCIUM 8.4 08/23/2021      EXAMINATION: CHEST PORTABLE VIEW       CLINICAL HISTORY: Increasing short of breath. Correlate. Follow-up       COMPARISONS: August 10, 2021       FINDINGS:       Single  views of the chest is submitted.  The cardiac silhouette is enlarged   Pulmonary vascular unremarkable. Right sided trachea. Persistent multifocal to patchy glass infiltrates throughout the lung parenchyma.  No Pneumothoraces.                                                                                        Impression   PERSISTENT PATCHY BILATERAL MULTIFOCAL TO GLASS INFILTRATES THROUGHOUT THE LUNG PARENCHYMA           PLAN:    COVID-19 pneumonia      On dexamethasone Lovenox   completed  remdesivir  Given 1 dose of Actemra    Intubated secondary to PE

## 2021-08-23 NOTE — PATIENT CARE CONFERENCE
Patient son Delfina Farah called in for update. He provided HIPAA code; update given. All questions answered. Verbalized.  Electronically signed by Sis Guzman RN on 8/23/2021 at 10:10 AM

## 2021-08-23 NOTE — PROGRESS NOTES
Hospitalist Daily Progress Note  Name: Elver Garcia  Age: 61 y.o. Gender: male  CodeStatus: Full Code  Allergies: No Known Allergies    Chief Complaint:Shortness of Breath    Primary Care Provider: Teresita Finn DO  InpatientTreatment Team: Treatment Team: Attending Provider: Uziel David DO; Consulting Physician: Toan Reynolds DO; Consulting Physician: Aimee Grant MD; Consulting Physician: Juan Conrad MD; Utilization Reviewer: Paula Fernandez, RN; Utilization Reviewer: Nathaly Barney RN; Registered Nurse: Kole Rocha, RN; Registered Nurse: Kimi Jones RN  Admission Date: 8/10/2021      Subjective: Patient seen evaluated bedside. Patient intubated this morning for hypoxia not improved with BiPAP. Currently on assist control tidal volume 480 PEEP of 14 rate of 28, 100% FiO2. Prone with Nimbex and Precedex for sedation    Physical Exam  Constitutional:       Appearance: Normal appearance. He is not ill-appearing or diaphoretic. HENT:      Head: Normocephalic and atraumatic. Nose: Nose normal.      Mouth/Throat:      Mouth: Mucous membranes are moist.      Pharynx: Oropharynx is clear. Cardiovascular:      Rate and Rhythm: Normal rate. Heart sounds: No murmur heard. No friction rub. No gallop. Pulmonary:      Breath sounds: No wheezing, rhonchi or rales. Abdominal:      General: There is no distension. Tenderness: There is no abdominal tenderness. There is no guarding. Musculoskeletal:         General: No swelling or tenderness. Normal range of motion. Neurological:      General: No focal deficit present. Mental Status: He is alert and oriented to person, place, and time. Psychiatric:         Mood and Affect: Mood normal.         Thought Content:  Thought content normal.         Judgment: Judgment normal.         Review of Systems  14 point ROS reviewed negative except for as above  Medications:  Reviewed    Infusion Medications:    fentaNYL (SUBLIMAZE) infusion 100 mcg/hr (08/22/21 1215)    propofol 50 mcg/kg/min (08/22/21 1731)    cisatracurium (NIMBEX) infusion 1.5 mcg/kg/min (08/22/21 1800)    dexmedetomidine 1 mcg/kg/hr (08/22/21 1454)    sodium chloride      dextrose       Scheduled Medications:    propofol        propofol        piperacillin-tazobactam  3,375 mg Intravenous Q8H    chlorhexidine  15 mL Mouth/Throat BID    pantoprazole  40 mg Intravenous Daily    And    sodium chloride (PF)  10 mL Intravenous Daily    enoxaparin  1 mg/kg Subcutaneous BID    insulin glargine  15 Units Subcutaneous BID    insulin lispro  0-6 Units Subcutaneous TID WC    insulin lispro  0-3 Units Subcutaneous Nightly    insulin lispro  4 Units Subcutaneous TID WC    dexamethasone  6 mg Intravenous BID    docusate sodium  100 mg Oral BID    polyethylene glycol  17 g Oral Daily    sodium chloride flush  5-40 mL Intravenous 2 times per day    traZODone  100 mg Oral Nightly     PRN Meds: ketorolac, guaiFENesin-codeine, sodium chloride flush, sodium chloride, ondansetron **OR** ondansetron, polyethylene glycol, acetaminophen **OR** acetaminophen, potassium chloride, magnesium sulfate, ipratropium-albuterol, hydrALAZINE, glucose, dextrose, glucagon (rDNA), dextrose    Labs:   Recent Labs     08/20/21 0526 08/20/21 0526 08/21/21  1307 08/21/21  1307 08/22/21  0539 08/22/21  1025 08/22/21  1759   WBC 19.1*  --  20.0*  --  18.9*  --   --    HGB 16.5   < > 16.1   < > 15.4 15.5 16.9   HCT 50.1  --  49.1  --  46.4  --   --      --  121*  --  133  --   --     < > = values in this interval not displayed.      Recent Labs     08/20/21 0526 08/20/21  0526 08/21/21  1307 08/21/21  1307 08/22/21  0539 08/22/21  1025 08/22/21  1759     --  135  134*  --  136  --   --    K 5.2*   < > 4.3  4.1  --  4.8  4.8  --   --      --  98  98  --  99  --   --    CO2 29  --  25  23  --  22  --   --    BUN 18  --  31*  31*  --  35*  --   --    CREATININE 0.74   < > 0.75  0.73   < > 0. 74 1.1 1.2   CALCIUM 8.9  --  8.7  8.7  --  8.4*  --   --    PHOS  --   --  6.3*  --  5.5*  --   --     < > = values in this interval not displayed. Recent Labs     08/20/21  0526 08/21/21  1307 08/22/21  0539   AST 26 24 25   ALT 21 16 14   BILITOT 0.5 0.5 0.5   ALKPHOS 218* 230* 190*     No results for input(s): INR in the last 72 hours. No results for input(s): Shellia Bugler in the last 72 hours. Urinalysis:   No results found for: Loli Bacca, BACTERIA, RBCUA, BLOODU, [address], [place] [address]    Radiology:   Most recent    Chest CT      WITH CONTRAST:No results found for this or any previous visit. WITHOUT CONTRAST: No results found for this or any previous visit. CXR      2-view: No results found for this or any previous visit. Portable: Results for orders placed during the hospital encounter of 08/10/21    XR CHEST PORTABLE    Narrative  EXAMINATION: XR CHEST PORTABLE    CLINICAL HISTORY: SHORTNESS OF BREATH    COMPARISONS: None available. FINDINGS: Patient leaning to left. Osseous structures intact. Cardiopericardial silhouette enlarged. Ill-defined areas of increased opacity are found in the right upper and right lower lung, with air bronchogram in the right lower lung. Air bronchogram  increased opacity is also found in the left lower lung, and to lesser degree the left midlung zone. Impression  BILATERAL ATELECTASIS/PNEUMONIA. CARDIOMEGALY      Echo No results found for this or any previous visit.             Assessment/Plan:    Active Hospital Problems    Diagnosis Date Noted    Acute respiratory failure with hypoxia ([facility]) [J96.01]     Pneumonia due to COVID-19 virus [U07.1, J12.82] 08/10/2021    [facility] [E11.9]     Hypoxia [R09.02]      65-year-old male admitted with hypoxic respiratory failure secondary to COVID-19 pneumonia, intubated 8/22 status post remdesivir Actemra and day 10 Decadron    Acute hypoxic respiratory failure

## 2021-08-23 NOTE — PROGRESS NOTES
Endocrinology Progress Note    Assessment and Plan:   Assessment-  1. Type II poorly controlled diabetes  2. COVID-19 pneumonia  3. Respiratory failure     Plan-  1. Increase Lantus 20 units twice daily  2. Continue Humalog sliding scale coverage every 4 hours  3. We will make insulin dosing adjustments pending glucose levels once patient starts tube feeds  4. Monitor glycemic control closely    POC Glucose:   Recent Labs     08/22/21  2345 08/23/21  0454 08/23/21  1110 08/23/21  1146 08/23/21  1355   POCGLU 209* 253* 242* 258* 205*     HGBA1C:  Lab Results   Component Value Date    LABA1C 13.7 (H) 08/10/2021     CBC:   Recent Labs     08/22/21  0539 08/22/21  1025 08/23/21  0555 08/23/21  1146   WBC 18.9*  --  16.7*  --    HGB 15.4   < > 15.6 16.2     --  147  --     < > = values in this interval not displayed. CMP:    Recent Labs     08/21/21  1307 08/21/21  1307 08/22/21  0539 08/22/21  1025 08/23/21  0454 08/23/21  0554 08/23/21  1146     134*  --  136  --   --  139  --    K 4.3  4.1   < > 4.8  4.8  --   --  5.9*  5.9*  --    CL 98  98  --  99  --   --  102  --    CO2 25  23  --  22  --   --  25  --    BUN 31*  31*  --  35*  --   --  37*  --    CREATININE 0.75  0.73   < > 0.74   < > 1.4* 0.97 1.5*   GLUCOSE 113*  110*  --  170*  --   --  259*  --    CALCIUM 8.7  8.7  --  8.4*  --   --  8.4*  --    LABGLOM >60.0  >60.0   < > >60.0   < > 52* >60.0 48*    < > = values in this interval not displayed. CC:   Chief Complaint   Patient presents with    Shortness of Breath       Subjective: Interval History: This patient is a 50-year-old poorly controlled type II diabetic male admitted to the intensive care unit for respiratory failure due to COVID-19 pneumonia. He was making progress and transferred to the medical floor until last week when he became hypoxic and was brought back to the intensive care unit at that time. He was intubated over the weekend, is currently stable. Glycemic control is good. They are placing a PEG tube we will make changes to his insulin dosing regiment once tube feeds to begin and we will monitor his glucose levels closely. Review of systems: denies polyuria, polydipsia, ABD pain, flank pain, N/V/D, or diaphoresis  Medications:   Scheduled Meds:   docusate  100 mg Per NG tube BID    insulin glargine  20 Units Subcutaneous BID    insulin lispro  0-12 Units Subcutaneous Q4H    piperacillin-tazobactam  3,375 mg Intravenous Q8H    chlorhexidine  15 mL Mouth/Throat BID    pantoprazole  40 mg Intravenous Daily    And    sodium chloride (PF)  10 mL Intravenous Daily    enoxaparin  1 mg/kg Subcutaneous BID    dexamethasone  6 mg Intravenous BID    polyethylene glycol  17 g Oral Daily    sodium chloride flush  5-40 mL Intravenous 2 times per day    traZODone  100 mg Oral Nightly     Continuous Infusions:   fentaNYL (SUBLIMAZE) infusion 100 mcg/hr (08/23/21 1509)    propofol 50 mcg/kg/min (08/23/21 0835)    cisatracurium (NIMBEX) infusion 4 mcg/kg/min (08/23/21 3288)    dexmedetomidine 1 mcg/kg/hr (08/23/21 1100)    sodium chloride      dextrose         Objective:   Vitals: /60   Pulse 56   Temp 102.2 °F (39 °C) (Bladder)   Resp 23   Ht 6' (1.829 m)   Wt 242 lb 8.1 oz (110 kg)   SpO2 94%   BMI 32.89 kg/m²    Wt Readings from Last 3 Encounters:   08/20/21 242 lb 8.1 oz (110 kg)   Physical exam findings per RN in PPE    General appearance: appears older than stated age, icteric, no distress and Sedated and intubated  Neck: no lymphadenopathy  Lungs: Diminished breath sounds bibasilar  Heart: regular rate and rhythm, S1, S2 normal, no murmur, click, rub or gallop  Abdomen: soft, non-tender. Bowel sounds normal. No masses,  no organomegaly.   Extremities: extremities normal, atraumatic, no cyanosis or edema    Patient Active Problem List:     Pneumonia due to COVID-19 virus     New onset type 2 diabetes mellitus ([facility]     Hypoxia     Acute respiratory failure with hypoxia [facility])        Electronically signed by APPLE Bazan on 8/23/2021 at 3:27 PM

## 2021-08-23 NOTE — PROGRESS NOTES
non-tender, no guarding or rebound  Musculoskeletal : no cyanosis, no clubbing and no edema  Neuro: Sedated, on vent in prone position  Skin: No rashes or nodules noted. Lymph node:  no cervical nodes  Urology: Yes Lugo   Psychiatric: Sedated on vent    Medications:  Scheduled Meds:   piperacillin-tazobactam  3,375 mg Intravenous Q8H    chlorhexidine  15 mL Mouth/Throat BID    pantoprazole  40 mg Intravenous Daily    And    sodium chloride (PF)  10 mL Intravenous Daily    enoxaparin  1 mg/kg Subcutaneous BID    insulin glargine  15 Units Subcutaneous BID    insulin lispro  0-6 Units Subcutaneous TID WC    insulin lispro  0-3 Units Subcutaneous Nightly    insulin lispro  4 Units Subcutaneous TID WC    dexamethasone  6 mg Intravenous BID    docusate sodium  100 mg Oral BID    polyethylene glycol  17 g Oral Daily    sodium chloride flush  5-40 mL Intravenous 2 times per day    traZODone  100 mg Oral Nightly       PRN Meds:  ketorolac, guaiFENesin-codeine, sodium chloride flush, sodium chloride, ondansetron **OR** ondansetron, polyethylene glycol, acetaminophen **OR** acetaminophen, potassium chloride, magnesium sulfate, ipratropium-albuterol, hydrALAZINE, glucose, dextrose, glucagon (rDNA), dextrose    Results: reviewed by me   CBC:   Recent Labs     08/21/21  1307 08/21/21  1307 08/22/21  0539 08/22/21  1025 08/22/21  2345 08/23/21  0454 08/23/21  0555   WBC 20.0*  --  18.9*  --   --   --  16.7*   HGB 16.1   < > 15.4   < > 17.6* 17.3 15.6   HCT 49.1  --  46.4  --   --   --  47.6   MCV 88.7  --  89.2  --   --   --  88.3   *  --  133  --   --   --  147    < > = values in this interval not displayed.      BMP:   Recent Labs     08/21/21  1307 08/21/21  1307 08/22/21  0539 08/22/21  1025 08/22/21  2345 08/23/21  0454 08/23/21  0554     134*  --  136  --   --   --  139   K 4.3  4.1   < > 4.8  4.8  --   --   --  5.9*  5.9*   CL 98  98  --  99  --   --   --  102   CO2 25  23  --  22  --   -- --  25   PHOS 6.3*  --  5.5*  --   --   --  5.5*   BUN 31*  31*  --  35*  --   --   --  37*   CREATININE 0.75  0.73   < > 0.74   < > 1.4* 1.4* 0.97    < > = values in this interval not displayed. LIVER PROFILE:   Recent Labs     08/21/21  1307 08/22/21  0539 08/23/21  0554   AST 24 25 20   ALT 16 14 15   BILITOT 0.5 0.5 0.3   ALKPHOS 230* 190* 183*     PT/INR: No results for input(s): PROTIME, INR in the last 72 hours. APTT: No results for input(s): APTT in the last 72 hours. UA:No results for input(s): NITRITE, COLORU, PHUR, LABCAST, WBCUA, RBCUA, MUCUS, TRICHOMONAS, YEAST, BACTERIA, CLARITYU, SPECGRAV, LEUKOCYTESUR, UROBILINOGEN, BILIRUBINUR, BLOODU, GLUCOSEU, AMORPHOUS in the last 72 hours. Invalid input(s): Gina Shirts    Cultures:  Negative so far  Films:  CT chest shows bilateral PE, bilateral groundglass infiltrate due to COVID-19 infection, feeding tube in good position, ET tube in good position    Assessment:   This is a critically ill patient at risk of deterioration / death , needing close ICU monitoring and intervention due to below noted problems     · Severe acute hypoxic respiratory failure  · Severe COVID-19 infection   · Hyperglycemia  · Hypercoagulable state    Recommendations  · Vent support lung protective strategy  · head of the bed 30°  · Daily sedation holidays and breathing trials  · Sedation with combination propofol and fentanyl target R ASS of 0 to -1  · Watch for ICU delirium: TV on, natural light, avoid benzos, pain control, early mobility, and family engagement  · PUD prophylaxis  · Continue Lovenox therapeutic dose  · Prone position   · Continue dexamethasone  · resp cultures  · procalcitonin   · emperic Zosyn   · Completed remdesivir treatment  · Target blood sugar 140-180,   · Increase Lantus to 20 units twice daily  · Monitor urine output and renal function  · DVT prophylaxis  · meatneb TID            Due to the immediate potential for life-threatening deterioration due to acute hypoxic respiratory failure, I spent 40 minutes providing critical care. This time is excluding time spent performing procedures.               Electronically signed by Anthony Castillo MD,  FCCP ,on 8/23/2021 at 8:41 AM

## 2021-08-24 NOTE — PROGRESS NOTES
Infectious Disease     Patient Name: Margie Crump  Date: 8/24/2021  YOB: 1965  Medical Record Number: [id number]      COVID-19 pneumonia      Worsened requiring intubation mechanical ventilation found to have pulmonary emboli        Remains intubated 50% FiO2 8 PEEP    Fevers    Chest x-ray shows persistent bilateral alveolar infiltrates somewhat improved from 8/22/2021    Review of Systems   Unable to perform ROS: Intubated       Prone ventilation   Physical Exam  Constitutional:       General: He is in acute distress. Appearance: He is ill-appearing. Cardiovascular:      Heart sounds: Normal heart sounds. No murmur heard. Pulmonary:      Effort: Pulmonary effort is normal.      Breath sounds: No wheezing, rhonchi or rales. Abdominal:      General: Abdomen is flat. There is no distension. Palpations: There is no mass. Tenderness: There is no abdominal tenderness. There is no guarding. Blood pressure (!) 109/59, pulse 58, temperature 101.7 °F (38.7 °C), temperature source Bladder, resp. rate 30, height 6' (1.829 m), weight 242 lb 8.1 oz (110 kg), SpO2 94 %. .   Lab Results   Component Value Date    WBC 13.8 (H) 08/24/2021    HGB 14.4 08/24/2021    HCT 44.9 08/24/2021    MCV 88.7 08/24/2021     08/24/2021     Lab Results   Component Value Date     08/24/2021    K 5.0 08/24/2021    K 5.0 08/24/2021     08/24/2021    CO2 27 08/24/2021    BUN 39 08/24/2021    CREATININE 1.4 08/24/2021    CREATININE 1.06 08/24/2021    GLUCOSE 236 08/24/2021    CALCIUM 8.3 08/24/2021      EXAMINATION: CHEST PORTABLE VIEW       CLINICAL HISTORY: Increasing short of breath. Correlate. Follow-up       COMPARISONS: August 10, 2021       FINDINGS:       Single  views of the chest is submitted.  The cardiac silhouette is enlarged   Pulmonary vascular unremarkable. Right sided trachea. Persistent multifocal to patchy glass infiltrates throughout the lung parenchyma.  No Pneumothoraces.                                                                                        Impression   PERSISTENT PATCHY BILATERAL MULTIFOCAL TO GLASS INFILTRATES THROUGHOUT THE LUNG PARENCHYMA           PLAN:    COVID-19 pneumonia      On dexamethasone Lovenox   completed  remdesivir  Given 1 dose of Actemra    Intubated secondary to PE    Empirically on Zosyn

## 2021-08-24 NOTE — PLAN OF CARE
Reviewed; ongoing. Continue present plan of care.  Electronically signed by Mady Sanchez RN on 8/24/2021 at 5:15 PM

## 2021-08-24 NOTE — PROGRESS NOTES
Endocrinology Progress Note    Assessment and Plan:   Assessment-  1. Type II poorly controlled diabetes  2. COVID-19 pneumonia  3. Respiratory failure     Plan-  1. Increase Lantus 25 units twice daily  2. Continue Humalog sliding scale coverage every 4 hours  3. We will make insulin dosing adjustments pending glucose levels once patient starts tube feeds  4. Monitor glycemic control closely    POC Glucose:   Recent Labs     08/23/21  2246 08/24/21  0515 08/24/21  0948 08/24/21  1032 08/24/21  1355   POCGLU 286* 250* 220* 273* 250*     HGBA1C:  Lab Results   Component Value Date    LABA1C 13.7 (H) 08/10/2021     CBC:   Recent Labs     08/23/21  0555 08/23/21  1146 08/24/21  0543 08/24/21  1032   WBC 16.7*  --  13.8*  --    HGB 15.6   < > 14.9 17.5     --  187  --     < > = values in this interval not displayed. CMP:    Recent Labs     08/22/21  0539 08/22/21  1025 08/23/21  0554 08/23/21  1146 08/24/21  0515 08/24/21  0544 08/24/21  1032     --  139  --   --  144  --    K 4.8  4.8  --  5.9*  5.9*  --   --  5.0*  5.0*  --    CL 99  --  102  --   --  106  --    CO2 22  --  25  --   --  27  --    BUN 35*  --  37*  --   --  39*  --    CREATININE 0.74   < > 0.97   < > 1.6* 1.06 1.5*   GLUCOSE 170*  --  259*  --   --  236*  --    CALCIUM 8.4*  --  8.4*  --   --  8.3*  --    LABGLOM >60.0   < > >60.0   < > 45* >60.0 48*    < > = values in this interval not displayed. CC:   Chief Complaint   Patient presents with    Shortness of Breath       Subjective: Interval History: This patient is a 44-year-old poorly controlled type II diabetic male admitted to the intensive care unit for respiratory failure due to COVID-19 pneumonia. He was making progress and transferred to the medical floor until last week when he became hypoxic and was brought back to the intensive care unit at that time. He was intubated over the weekend, is currently stable. Glycemic control is good.   PEG tube is in place,

## 2021-08-24 NOTE — PROGRESS NOTES
(TYLENOL) suppository 650 mg, 650 mg, Rectal, Q6H PRN  potassium chloride 10 mEq/100 mL IVPB (Peripheral Line), 10 mEq, IntraVENous, PRN  magnesium sulfate 2000 mg in 50 mL IVPB premix, 2,000 mg, IntraVENous, PRN  ipratropium-albuterol (DUONEB) nebulizer solution 1 ampule, 1 ampule, Inhalation, Q4H PRN  hydrALAZINE (APRESOLINE) injection 10 mg, 10 mg, IntraVENous, Q6H PRN  traZODone (DESYREL) tablet 100 mg, 100 mg, Oral, Nightly  glucose (GLUTOSE) 40 % oral gel 15 g, 15 g, Oral, PRN  dextrose 50 % IV solution, 12.5 g, IntraVENous, PRN  glucagon (rDNA) injection 1 mg, 1 mg, Intramuscular, PRN  dextrose 5 % solution, 100 mL/hr, IntraVENous, PRN  Physical    VITALS:  BP (!) 109/59   Pulse 79   Temp 101.8 °F (38.8 °C) (Bladder)   Resp (!) 31   Ht 6' (1.829 m)   Wt 242 lb 8.1 oz (110 kg)   SpO2 95%   BMI 32.89 kg/m²   Constitutional: intubated and sedated  Head: Normocephalic, atraumatic  Lungs: no tachypnea, on the ventilator  Heart: tachycardic on tele  GI: obese     Data    CBC:   Lab Results   Component Value Date    WBC 13.8 08/24/2021    RBC 5.06 08/24/2021    HGB 17.5 08/24/2021    HCT 44.9 08/24/2021    MCV 88.7 08/24/2021    MCH 29.3 08/24/2021    MCHC 33.1 08/24/2021    RDW 13.2 08/24/2021     08/24/2021     CMP:    Lab Results   Component Value Date     08/24/2021    K 5.0 08/24/2021    K 5.0 08/24/2021     08/24/2021    CO2 27 08/24/2021    BUN 39 08/24/2021    CREATININE 1.5 08/24/2021    CREATININE 1.06 08/24/2021    GFRAA 58 08/24/2021    LABGLOM 48 08/24/2021    GLUCOSE 236 08/24/2021    PROT 5.9 08/24/2021    LABALBU 2.7 08/24/2021    CALCIUM 8.3 08/24/2021    BILITOT 0.3 08/24/2021    ALKPHOS 137 08/24/2021    AST 17 08/24/2021    ALT 13 08/24/2021       ASSESSMENT AND PLAN      # Acute hypoxic respiratory failure 2/2 COVID-19 pneumonia  - was intubated on 8/22  - sedated, paralyzed.  Prone as tolerated  - critical care consulted  - completed course of Lurena Vernonr  - continuing decadron, lovenox  - ID consulted  - empiric zosyn    # New onset diabetes  - cont lantus, ISS  - endocrinology consulted    DVT: lovenox per covid protocol    Disposition: Pt continues to be on high vent settings. Critical care managing. Continuing decadron, zosyn. ID consulted.        Leobardo Valverde DO  Internal Medicine

## 2021-08-24 NOTE — PROGRESS NOTES
Pulmonary & Critical Care Medicine ICU Progress Note  Chief complaint : Severe acute hypoxic respiratory failure    Subjunctive/24 hour events :   Patient seen and examined during multidisciplinary rounds with RN, charge nurse, RT, pharmacy, dietitian, and social service. Intubated currently in prone position, on 50% FiO2 and 8 of PEEP saturation 93%, he is sedated, not paralyzed, he did not tolerate tube feed yesterday with vomiting, tube feed was put on hold, continue to have fever 38.2, today is day 10 dexamethasone, urine output 2300 cc, no bowel movement    Social History     Tobacco Use    Smoking status: Never Smoker    Smokeless tobacco: Never Used   Substance Use Topics    Alcohol use: Never     History reviewed. No pertinent family history. Recent Labs     08/23/21  2246 08/24/21  0515   PHART 7.362 7.395   [id number] 51* 49*   PO2ART 87* 74*       MV Settings:  Vent Mode: AC/VC Rate Set: 28 bmp/Vt Ordered: 480 mL/ /FiO2 : 50 %           IV:   fentaNYL (SUBLIMAZE) infusion 100 mcg/hr (08/24/21 0000)    propofol 40 mcg/kg/min (08/24/21 0835)    cisatracurium (NIMBEX) infusion Stopped (08/23/21 1100)    dexmedetomidine 0.8 mcg/kg/hr (08/24/21 0811)    sodium chloride      dextrose         Vitals:  BP (!) 109/59   Pulse 50   Temp 100.8 °F (38.2 °C) (Bladder)   Resp 29   Ht 6' (1.829 m)   Wt 242 lb 8.1 oz (110 kg)   SpO2 93%   BMI 32.89 kg/m²    Tmax:        Intake/Output Summary (Last 24 hours) at 8/24/2021 0840  Last data filed at 8/23/2021 1930  Gross per 24 hour   Intake 1772 ml   Output 2300 ml   Net -528 ml       EXAM:  General: Sedated, on vent  Head: normocephalic, atraumatic  Eyes:No gross abnormalities. ENT:  MMM no lesions,  ET tube and OG tube  Neck:  supple and no masses  Chest : Good air movement, no rales, nontender, tympanic  Heart[de-identified] Heart sounds are normal.  Regular rate and rhythm without murmur, gallop or rub.   ABD:  symmetric, soft, non-tender, no guarding or rebound  Musculoskeletal : no cyanosis, no clubbing and no edema  Neuro: Sedated, did not wake up or follow commands,  Skin: No rashes or nodules noted. Lymph node:  no cervical nodes  Urology: Yes Lugo   Psychiatric: Sedated on vent    Medications:  Scheduled Meds:   docusate  100 mg Per NG tube BID    insulin glargine  20 Units Subcutaneous BID    insulin lispro  0-12 Units Subcutaneous Q4H    piperacillin-tazobactam  3,375 mg Intravenous Q8H    chlorhexidine  15 mL Mouth/Throat BID    pantoprazole  40 mg Intravenous Daily    And    sodium chloride (PF)  10 mL Intravenous Daily    enoxaparin  1 mg/kg Subcutaneous BID    dexamethasone  6 mg Intravenous BID    polyethylene glycol  17 g Oral Daily    sodium chloride flush  5-40 mL Intravenous 2 times per day    traZODone  100 mg Oral Nightly       PRN Meds:  guaiFENesin-codeine, sodium chloride flush, sodium chloride, ondansetron **OR** ondansetron, polyethylene glycol, acetaminophen **OR** acetaminophen, potassium chloride, magnesium sulfate, ipratropium-albuterol, hydrALAZINE, glucose, dextrose, glucagon (rDNA), dextrose    Results: reviewed by me   CBC:   Recent Labs     08/22/21  0539 08/22/21  1025 08/23/21  0555 08/23/21  1146 08/23/21 2246 08/24/21  0515 08/24/21  0543   WBC 18.9*  --  16.7*  --   --   --  13.8*   HGB 15.4   < > 15.6   < > 14.6 15.1 14.9   HCT 46.4  --  47.6  --   --   --  44.9   MCV 89.2  --  88.3  --   --   --  88.7     --  147  --   --   --  187    < > = values in this interval not displayed.      BMP:   Recent Labs     08/22/21  0539 08/22/21  1025 08/23/21  0554 08/23/21  1146 08/23/21 2246 08/24/21  0515 08/24/21  0544     --  139  --   --   --  144   K 4.8  4.8  --  5.9*  5.9*  --   --   --  5.0*  5.0*   CL 99  --  102  --   --   --  106   CO2 22  --  25  --   --   --  27   PHOS 5.5*  --  5.5*  --   --   --  3.4   BUN 35*  --  37*  --   --   --  39*   CREATININE 0.74   < > 0.97   < > 1.6* 1.6* 1.06    < > = values in this interval not displayed. LIVER PROFILE:   Recent Labs     08/22/21  0539 08/23/21  0554 08/24/21  0544   AST 25 20 17   ALT 14 15 13   BILITOT 0.5 0.3 0.3   ALKPHOS 190* 183* 137*     PT/INR: No results for input(s): PROTIME, INR in the last 72 hours. APTT: No results for input(s): APTT in the last 72 hours. UA:No results for input(s): NITRITE, COLORU, PHUR, LABCAST, WBCUA, RBCUA, MUCUS, TRICHOMONAS, YEAST, BACTERIA, CLARITYU, SPECGRAV, LEUKOCYTESUR, UROBILINOGEN, BILIRUBINUR, BLOODU, GLUCOSEU, AMORPHOUS in the last 72 hours. Invalid input(s): Samara Huitron    Cultures:  Negative so far  Films:  Chest x-ray reviewed by me, congested, ET tube is high, feeding tube below the diaphragm    Assessment: This is a critically ill patient at risk of deterioration / death , needing close ICU monitoring and intervention due to below noted problems     · Severe acute hypoxic respiratory failure  · Severe COVID-19 infection   · Hyperglycemia  · Hypercoagulable state    Recommendations  · Advance ET tube 3 cm  · Vent support lung protective strategy  · head of the bed 30°  · Breathing trial today  · Sedation holiday today  · Sedation with combination propofol and fentanyl target R ASS of 0 to -1  · Watch for ICU delirium: TV on, natural light, avoid benzos, pain control, early mobility, and family engagement  · PUD prophylaxis  · Continue Lovenox therapeutic dose  · Supine and breathing trial today  · DC dexamethasone patient completed 10 days of treatment  · resp cultures  · Check procalcitonin  · Continue Zosyn  · Completed remdesivir treatment  · Target blood sugar 140-180,   · Increase Lantus to 25 units twice daily  · Monitor urine output and renal function  · DVT prophylaxis  · meatneb TID   · Reglan 10 mg x 3 doses today           Due to the immediate potential for life-threatening deterioration due to acute hypoxic respiratory failure, I spent 38  minutes providing critical care.   This time is excluding time spent performing procedures.               Electronically signed by Juventino Gama MD,  [facility] ,on 8/24/2021 at 8:40 AM

## 2021-08-24 NOTE — FLOWSHEET NOTE
1900 Bedside report received from Hugo/Lucinda RN's. Patients son traveling back from the [facility] to see father. Guadalupe Swanson and house supervisor notified. Spoke with son and he is expects to arrive here tomorrow morning. 1945 Head to toe complete, see chart. Patients linens and prone pillow saturated in tube feeding formula. When assessing patient tube feed formula continuously drained from both nostrils. Tube feed was immediately stopped and hospitalist (Dr. Myah Wallis) was notified. He instructed me to hold tube feed while patient was proned. Nasal suctioning preformed and approximately 50 cc of tube feed suctioned from nostrils. 0400 Complete linen change and bed yarely preformed.

## 2021-08-25 NOTE — PROGRESS NOTES
0700 Assumed care of this patient. Weaned patient off of precedex, and levophed. [id number] Attempted to place patient on CPAP mode and patient began to have an irregular heart rhythm and multiple PVC's.  Patient

## 2021-08-25 NOTE — PROGRESS NOTES
Pulmonary & Critical Care Medicine ICU Progress Note  Chief complaint : Severe acute hypoxic respiratory failure    Subjunctive/24 hour events :   Patient seen and examined during multidisciplinary rounds with RN, charge nurse, RT, pharmacy, dietitian, and social service. Intubated, currently on 50% FiO2, and 5 of PEEP, saturation 95 to 98%, he became bradycardic overnight, requiring dopamine drip currently on 6 mcg/kg/min, he did require Levophed overnight currently off Levophed, breathing trial attempted today he became tachypneic and hypoxic with increased PVCs back on ventilator, he wakes up and follows commands open his eyes but still sedated and weak, bowels moving, tolerates tube feed, urine output 800 cc, no vomiting, T-max 36 7    Social History     Tobacco Use    Smoking status: Never Smoker    Smokeless tobacco: Never Used   Substance Use Topics    Alcohol use: Never     History reviewed. No pertinent family history. Recent Labs     08/24/21  2338 08/25/21  0609   PHART 7.401 7.414   [id number] 46* 46*   PO2ART 78* 76*       MV Settings:  Vent Mode: AC/VC Rate Set: 30 bmp/Vt Ordered: 480 mL/ /FiO2 : 50 %           IV:   DOPamine 6 mcg/kg/min (08/25/21 0920)    norepinephrine Stopped (08/25/21 0757)    fentaNYL (SUBLIMAZE) infusion 75 mcg/hr (08/25/21 0759)    propofol 20 mcg/kg/min (08/25/21 0847)    dexmedetomidine Stopped (08/25/21 0848)    sodium chloride      dextrose         Vitals:  BP (!) 142/64   Pulse 74   Temp 98.1 °F (36.7 °C) (Bladder)   Resp 30   Ht 6' (1.829 m)   Wt 242 lb 8.1 oz (110 kg)   SpO2 95%   BMI 32.89 kg/m²    Tmax:        Intake/Output Summary (Last 24 hours) at 8/25/2021 1039  Last data filed at 8/24/2021 1706  Gross per 24 hour   Intake 1326 ml   Output 800 ml   Net 526 ml       EXAM:  General: Sedated, on vent  Head: normocephalic, atraumatic  Eyes:No gross abnormalities.   ENT:  MMM no lesions,  ET tube and OG tube  Neck:  supple and no masses  Chest : Good air movement, no rales, nontender, tympanic  Heart[de-identified] Heart sounds are normal.  Regular rate and rhythm without murmur, gallop or rub. ABD:  symmetric, soft, non-tender, no guarding or rebound  Musculoskeletal : no cyanosis, no clubbing and no edema  Neuro: Sedated, did not wake up or follow commands,  Skin: No rashes or nodules noted. Lymph node:  no cervical nodes  Urology: Yes Lugo   Psychiatric: Sedated on vent    Medications:  Scheduled Meds:   insulin glargine  25 Units Subcutaneous BID    docusate  100 mg Per NG tube BID    insulin lispro  0-12 Units Subcutaneous Q4H    piperacillin-tazobactam  3,375 mg IntraVENous Q8H    chlorhexidine  15 mL Mouth/Throat BID    pantoprazole  40 mg IntraVENous Daily    And    sodium chloride (PF)  10 mL IntraVENous Daily    enoxaparin  1 mg/kg Subcutaneous BID    polyethylene glycol  17 g Oral Daily    sodium chloride flush  5-40 mL IntraVENous 2 times per day    traZODone  100 mg Oral Nightly       PRN Meds:  guaiFENesin-codeine, sodium chloride flush, sodium chloride, ondansetron **OR** ondansetron, polyethylene glycol, acetaminophen **OR** acetaminophen, potassium chloride, magnesium sulfate, ipratropium-albuterol, hydrALAZINE, glucose, dextrose, glucagon (rDNA), dextrose    Results: reviewed by me   CBC:   Recent Labs     08/23/21  0555 08/23/21  1146 08/24/21  0543 08/24/21  1032 08/24/21  2338 08/25/21  0510 08/25/21  0609   WBC 16.7*  --  13.8*  --   --  14.3*  --    HGB 15.6   < > 14.9   < > 14.4 14.9 16.4   HCT 47.6  --  44.9  --   --  45.3  --    MCV 88.3  --  88.7  --   --  89.4  --      --  187  --   --  249  --     < > = values in this interval not displayed.      BMP:   Recent Labs     08/23/21  0554 08/23/21  1146 08/24/21  0544 08/24/21  1032 08/24/21  2338 08/25/21  0502 08/25/21  0609     --  144  --   --  142  --    K 5.9*  5.9*  --  5.0*  5.0*  --   --  4.1  4.1  --      --  106  --   --  104  --    CO2 25  --  27  --   -- 25  --    PHOS 5.5*  --  3.4  --   --  3.4  --    BUN 37*  --  39*  --   --  40*  --    CREATININE 0.97   < > 1.06   < > 1.3 0.98 1.2    < > = values in this interval not displayed. LIVER PROFILE:   Recent Labs     08/23/21  0554 08/24/21  0544 08/25/21  0502   AST 20 17 17   ALT 15 13 20   BILITOT 0.3 0.3 0.4   ALKPHOS 183* 137* 133*     PT/INR: No results for input(s): PROTIME, INR in the last 72 hours. APTT: No results for input(s): APTT in the last 72 hours. UA:No results for input(s): NITRITE, COLORU, PHUR, LABCAST, WBCUA, RBCUA, MUCUS, TRICHOMONAS, YEAST, BACTERIA, CLARITYU, SPECGRAV, LEUKOCYTESUR, UROBILINOGEN, BILIRUBINUR, BLOODU, GLUCOSEU, AMORPHOUS in the last 72 hours. Invalid input(s): KETONESU    Cultures:  Gram-negative sanjana in sputum  Films:  Chest x-ray reviewed by me, congested, ET tube is high, feeding tube below the diaphragm    Assessment:   This is a critically ill patient at risk of deterioration / death , needing close ICU monitoring and intervention due to below noted problems     · Severe acute hypoxic respiratory failure  · Severe COVID-19 infection   · Bacterial coinfection, gram-negative rods  · Hyperglycemia  · Hypercoagulable state    Recommendations  · Advance ET tube 3 cm  · Vent support lung protective strategy  · head of the bed 30°  · Breathing trial today  · Sedation holiday today  · Sedation with combination propofol and fentanyl target R ASS of 0 to -1  · Watch for ICU delirium: TV on, natural light, avoid benzos, pain control, early mobility, and family engagement  · PUD prophylaxis  · Continue Lovenox therapeutic dose  · Supine and breathing trial today  · DC dexamethasone patient completed 10 days of treatment  · resp cultures  · Repeat procalcitonin tomorrow  · Continue Zosyn  · Completed remdesivir treatment  · Target blood sugar 140-180,   · Appreciate endocrinology  · Monitor urine output and renal function  · DVT prophylaxis  · meatneb TID   · Continue laxatives           Due to the immediate potential for life-threatening deterioration due to acute hypoxic respiratory failure, I spent 39  minutes providing critical care. This time is excluding time spent performing procedures.               Electronically signed by Burke Hunt MD,  [facility] ,on 8/25/2021 at 10:39 AM

## 2021-08-25 NOTE — PROGRESS NOTES
Department of Internal Medicine  General Internal Medicine  Attending Progress Note      SUBJECTIVE:  Pt seen through ICU door to reduce exposure to COVID-19. Pt intubated and sedated in the supine position.     OBJECTIVE      Medications    Current Facility-Administered Medications: DOPamine (INTROPIN) 400 mg in dextrose 5 % 250 mL infusion, 2-20 mcg/kg/min, IntraVENous, Continuous  insulin glargine (LANTUS) injection vial 35 Units, 35 Units, Subcutaneous, BID  insulin lispro (HUMALOG) injection vial 4 Units, 4 Units, Subcutaneous, 4x Daily  lactated ringers bolus, 500 mL, IntraVENous, Once  docusate (COLACE) 50 MG/5ML liquid 100 mg, 100 mg, Per NG tube, BID  insulin lispro (HUMALOG) injection vial 0-12 Units, 0-12 Units, Subcutaneous, Q4H  fentaNYL (SUBLIMAZE) 1,000 mcg in sodium chloride 0.9 % 100 mL infusion, 12.5-200 mcg/hr, IntraVENous, Continuous  piperacillin-tazobactam (ZOSYN) 3,375 mg in dextrose 5 % 50 mL IVPB extended infusion (mini-bag), 3,375 mg, IntraVENous, Q8H  chlorhexidine (PERIDEX) 0.12 % solution 15 mL, 15 mL, Mouth/Throat, BID  pantoprazole (PROTONIX) injection 40 mg, 40 mg, IntraVENous, Daily **AND** sodium chloride (PF) 0.9 % injection 10 mL, 10 mL, IntraVENous, Daily  propofol injection, 5-50 mcg/kg/min, IntraVENous, Titrated  enoxaparin (LOVENOX) injection 105 mg, 1 mg/kg, Subcutaneous, BID  dexmedetomidine (PRECEDEX) 400 mcg in sodium chloride 0.9 % 100 mL infusion, 0.2-1.4 mcg/kg/hr, IntraVENous, Continuous  guaiFENesin-codeine (GUAIFENESIN AC) 100-10 MG/5ML liquid 5 mL, 5 mL, Oral, Q4H PRN  polyethylene glycol (GLYCOLAX) packet 17 g, 17 g, Oral, Daily  sodium chloride flush 0.9 % injection 5-40 mL, 5-40 mL, IntraVENous, 2 times per day  sodium chloride flush 0.9 % injection 5-40 mL, 5-40 mL, IntraVENous, PRN  0.9 % sodium chloride infusion, 25 mL, IntraVENous, PRN  ondansetron (ZOFRAN-ODT) disintegrating tablet 4 mg, 4 mg, Oral, Q8H PRN **OR** ondansetron (ZOFRAN) injection 4 mg, 4 mg, IntraVENous, Q6H PRN  polyethylene glycol (GLYCOLAX) packet 17 g, 17 g, Oral, Daily PRN  acetaminophen (TYLENOL) tablet 650 mg, 650 mg, Oral, Q6H PRN **OR** acetaminophen (TYLENOL) suppository 650 mg, 650 mg, Rectal, Q6H PRN  potassium chloride 10 mEq/100 mL IVPB (Peripheral Line), 10 mEq, IntraVENous, PRN  magnesium sulfate 2000 mg in 50 mL IVPB premix, 2,000 mg, IntraVENous, PRN  ipratropium-albuterol (DUONEB) nebulizer solution 1 ampule, 1 ampule, Inhalation, Q4H PRN  hydrALAZINE (APRESOLINE) injection 10 mg, 10 mg, IntraVENous, Q6H PRN  traZODone (DESYREL) tablet 100 mg, 100 mg, Oral, Nightly  glucose (GLUTOSE) 40 % oral gel 15 g, 15 g, Oral, PRN  dextrose 50 % IV solution, 12.5 g, IntraVENous, PRN  glucagon (rDNA) injection 1 mg, 1 mg, Intramuscular, PRN  dextrose 5 % solution, 100 mL/hr, IntraVENous, PRN  Physical    VITALS:  BP (!) 142/64   Pulse 90   Temp 98.4 °F (36.9 °C) (Bladder)   Resp 14   Ht 6' (1.829 m)   Wt 242 lb 8.1 oz (110 kg)   SpO2 97%   BMI 32.89 kg/m²   Constitutional: intubated and sedated  Head: Normocephalic, atraumatic  Lungs: no tachypnea, on the ventilator  Heart: tachycardic on tele  GI: obese     Data    CBC:   Lab Results   Component Value Date    WBC 14.3 08/25/2021    RBC 5.06 08/25/2021    HGB 14.5 08/25/2021    HCT 45.3 08/25/2021    MCV 89.4 08/25/2021    MCH 29.4 08/25/2021    MCHC 32.9 08/25/2021    RDW 13.4 08/25/2021     08/25/2021     CMP:    Lab Results   Component Value Date     08/25/2021    K 4.1 08/25/2021    K 4.1 08/25/2021     08/25/2021    CO2 25 08/25/2021    BUN 40 08/25/2021    CREATININE 1.0 08/25/2021    CREATININE 0.98 08/25/2021    GFRAA >60 08/25/2021    LABGLOM >60 08/25/2021    GLUCOSE 366 08/25/2021    PROT 5.7 08/25/2021    LABALBU 2.8 08/25/2021    CALCIUM 9.4 08/25/2021    BILITOT 0.4 08/25/2021    ALKPHOS 133 08/25/2021    AST 17 08/25/2021    ALT 20 08/25/2021       ASSESSMENT AND PLAN      # Acute hypoxic respiratory failure 2/2 COVID-19 pneumonia  - was intubated on 8/22  - sedated, paralyzed. Prone as tolerated  - critical care consulted  - completed course of remdesivir, actemra  - continuing decadron, lovenox  - ID consulted  - empiric zosyn    # New onset diabetes  - cont lantus, ISS  - endocrinology consulted    DVT: lovenox per covid protocol    Disposition: Pt continues to be on vent. Critical care managing. Continuing decadron, zosyn. ID consulted.        Caty Mulligan DO  Internal Medicine

## 2021-08-25 NOTE — FLOWSHEET NOTE
[address] [place] [address] [facility]. See new orderes.

## 2021-08-25 NOTE — PROGRESS NOTES
Infectious Disease     Patient Name: Morena Mandujano  Date: 8/25/2021  YOB: 1965  Medical Record Number: [id number]      COVID-19 pneumonia      Worsened requiring intubation mechanical ventilation found to have pulmonary emboli        Remains intubated 50% FiO2 8 PEEP    Fevers    Chest x-ray shows persistent bilateral alveolar infiltrates somewhat improved from 8/22/2021    Review of Systems   Unable to perform ROS: Intubated       Prone ventilation   Physical Exam  Constitutional:       General: He is not in acute distress. Appearance: He is ill-appearing. Cardiovascular:      Heart sounds: Normal heart sounds. No murmur heard. Pulmonary:      Effort: Pulmonary effort is normal.      Breath sounds: Rhonchi present. No wheezing or rales. Abdominal:      General: Abdomen is flat. There is no distension. Palpations: There is no mass. Tenderness: There is no abdominal tenderness. There is no guarding. Musculoskeletal:      Cervical back: Neck supple. No rigidity. Right lower leg: No edema. Left lower leg: No edema. Blood pressure (!) 142/64, pulse 90, temperature 98.4 °F (36.9 °C), temperature source Bladder, resp. rate 14, height 6' (1.829 m), weight 242 lb 8.1 oz (110 kg), SpO2 97 %. .   Lab Results   Component Value Date    WBC 14.3 (H) 08/25/2021    HGB 14.5 08/25/2021    HCT 45.3 08/25/2021    MCV 89.4 08/25/2021     08/25/2021     Lab Results   Component Value Date     08/25/2021    K 4.1 08/25/2021    K 4.1 08/25/2021     08/25/2021    CO2 25 08/25/2021    BUN 40 08/25/2021    CREATININE 1.0 08/25/2021    CREATININE 0.98 08/25/2021    GLUCOSE 366 08/25/2021    CALCIUM 9.4 08/25/2021      EXAMINATION: CHEST PORTABLE VIEW       CLINICAL HISTORY: Increasing short of breath. Correlate.  Follow-up       COMPARISONS: August 10, 2021       FINDINGS:       Single  views of the chest is submitted.  The cardiac silhouette is enlarged   Pulmonary vascular unremarkable. Right sided trachea. Persistent multifocal to patchy glass infiltrates throughout the lung parenchyma. No Pneumothoraces.                                                                                        Impression   PERSISTENT PATCHY BILATERAL MULTIFOCAL TO GLASS INFILTRATES THROUGHOUT THE LUNG PARENCHYMA       Culture, Urine [[id number]] Collected: 08/23/21 1114   Order Status: Completed Specimen: Urine, clean catch Updated: 08/25/21 1138    Urine Culture, Routine No growth 24 hours   Narrative:     ORDER#: [id number]                          ORDERED BY: Pola Blizzard   SOURCE: Urine Clean Catch  Urine           COLLECTED:  08/23/21 11:14   ANTIBIOTICS AT CADEN. :                      RECEIVED :  08/23/21 11:14   Culture, Respiratory [[id number]] (Abnormal) Collected: 08/24/21 0540   Order Status: Completed Specimen: Sputum from Endotracheal Updated: 08/25/21 1020    Gram Stain Result Moderate WBC's   Moderate Gram negative rods   Few Gram positive cocci   Few Yeast     Organism Gram negative rodAbnormal     CULTURE, RESPIRATORY --    Moderate growth   ID and sensitivity to follow    Narrative:     ORDER#: [id number]                          ORDERED BY: MUKESH Leon   SOURCE: Endotracheal Sputum                COLLECTED:  08/24/21 05:40   ANTIBIOTICS AT CADEN. :                      RECEIVED :  08/24/21 05:40   Culture, Blood 1 [[id number]] Collected: 08/23/21 0949   Order Status: Completed Specimen: Blood Updated: 08/24/21 1215    Blood Culture, Routine No Growth to date.  Any change in status will be called. Narrative:     ORDER#: [id number]                          ORDERED BY: Pola Blizzard   SOURCE: Blood                              COLLECTED:  08/23/21 09:49   ANTIBIOTICS AT CADEN. :                      RECEIVED :  08/23/21 10:17   Culture, Blood 2 [[id number]] Collected: 08/23/21 0950   Order Status: Completed Specimen: Blood Updated: 08/24/21 1215    Culture, Blood 2 No Growth to date. Gisselle Robertson change in status will be called.          PLAN:    COVID-19 pneumonia      On dexamethasone Lovenox   completed  remdesivir  Given 1 dose of Actemra    Intubated secondary to PE    Empirically on Zosyn

## 2021-08-26 NOTE — PROGRESS NOTES
Infectious Disease     Patient Name: Ana María Garcia  Date: 8/26/2021  YOB: 1965  Medical Record Number: [id number]      COVID-19 pneumonia      Worsened requiring intubation mechanical ventilation found to have pulmonary emboli        Remains intubated 50% FiO2 8 PEEP  Tolerating some weaning  Fevers    Chest x-ray shows persistent bilateral alveolar infiltrates somewhat improved from 8/22/2021    Review of Systems   Unable to perform ROS: Intubated       Prone ventilation   Physical Exam  Constitutional:       Appearance: He is ill-appearing. Cardiovascular:      Heart sounds: Normal heart sounds. No murmur heard. Pulmonary:      Effort: Pulmonary effort is normal.      Breath sounds: No wheezing, rhonchi or rales. Abdominal:      General: Abdomen is flat. There is no distension. Palpations: There is no mass. Tenderness: There is no abdominal tenderness. There is no guarding. Musculoskeletal:      Right lower leg: No edema. Left lower leg: No edema. Blood pressure (!) 142/64, pulse 148, temperature 98.6 °F (37 °C), temperature source Bladder, resp. rate (!) 33, height 6' (1.829 m), weight 242 lb 8.1 oz (110 kg), SpO2 92 %. .   Lab Results   Component Value Date    WBC 9.7 08/26/2021    HGB 13.2 (L) 08/26/2021    HCT 40.7 (L) 08/26/2021    MCV 88.5 08/26/2021     08/26/2021     Lab Results   Component Value Date     08/26/2021    K 3.6 08/26/2021    K 3.6 08/26/2021     08/26/2021    CO2 29 08/26/2021    BUN 29 08/26/2021    CREATININE 0.9 08/26/2021    CREATININE 0.63 08/26/2021    GLUCOSE 259 08/26/2021    CALCIUM 8.8 08/26/2021      EXAMINATION: CHEST PORTABLE VIEW       CLINICAL HISTORY: Increasing short of breath. Correlate. Follow-up       COMPARISONS: August 10, 2021       FINDINGS:       Single  views of the chest is submitted.  The cardiac silhouette is enlarged   Pulmonary vascular unremarkable. Right sided trachea.    Persistent multifocal to patchy glass infiltrates throughout the lung parenchyma. No Pneumothoraces.                                                                                        Impression   PERSISTENT PATCHY BILATERAL MULTIFOCAL TO GLASS INFILTRATES THROUGHOUT THE LUNG PARENCHYMA     CULTURE, RESPIRATORY 08/24/2021  5:40 AM [facility]   Moderate growth   ID and sensitivity to follow    Organism Abnormal  08/24/2021  5:40 AM [facility] - [facility]   Gram negative sanjana    CULTURE, RESPIRATORY 08/24/2021  5:40 AM [facility] - [facility]   Light growth   ID and sensitivity to follow    Testing Performed By    Lab - Abbreviation Name Director Address Valid Date Range   343-LS - [address] [facility] Ruel Feldman MD [address] [place], [place] [address] [address] 07/12/18 0959-Present   Narrative  Performed by: [facility]  ORDER#: [id number]                          ORDERED BY: MUKESH PAUL   SOURCE: Endotracheal Sputum                COLLECTED:  08/24/21 05:40   ANTIBIOTICS AT [facility]. :                      RECEIVED :  08/24/21 05:40   Susceptibility    Acinetobacter baumannii (1)    Antibiotic Interpretation YESSICA Status    ampicillin-sulbactam Sensitive <=2 mcg/mL     cefepime Sensitive 2 mcg/mL     cefTRIAXone Sensitive 8 mcg/mL     ciprofloxacin Sensitive <=0.25 mcg/mL     gentamicin Sensitive <=1 mcg/mL     imipenem Sensitive <=0.25 mcg/mL     tobramycin Sensitive <=1 mcg/mL             PLAN:    COVID-19 pneumonia  Intubated secondary to PE  Empirically on Zosyn which will cover Acinetobacter from sputum

## 2021-08-26 NOTE — CARE COORDINATION
Team ICU quality rounds done daily outside of patients room. He continues on vent with sedation and we will continue to follow for any needs/plan.

## 2021-08-26 NOTE — PROGRESS NOTES
Department of Internal Medicine  General Internal Medicine  Attending Progress Note      SUBJECTIVE:  Pt seen through ICU door to reduce exposure to COVID-19. Pt intubated and in the supine position.     OBJECTIVE      Medications    Current Facility-Administered Medications: DOPamine (INTROPIN) 400 mg in dextrose 5 % 250 mL infusion, 2-20 mcg/kg/min, IntraVENous, Continuous  insulin glargine (LANTUS) injection vial 35 Units, 35 Units, Subcutaneous, BID  insulin lispro (HUMALOG) injection vial 4 Units, 4 Units, Subcutaneous, 4x Daily  norepinephrine (LEVOPHED) 16 mg in dextrose 5% 250 mL infusion, 2-100 mcg/min, IntraVENous, Continuous  docusate (COLACE) 50 MG/5ML liquid 100 mg, 100 mg, Per NG tube, BID  insulin lispro (HUMALOG) injection vial 0-12 Units, 0-12 Units, Subcutaneous, Q4H  fentaNYL (SUBLIMAZE) 1,000 mcg in sodium chloride 0.9 % 100 mL infusion, 12.5-200 mcg/hr, IntraVENous, Continuous  piperacillin-tazobactam (ZOSYN) 3,375 mg in dextrose 5 % 50 mL IVPB extended infusion (mini-bag), 3,375 mg, IntraVENous, Q8H  chlorhexidine (PERIDEX) 0.12 % solution 15 mL, 15 mL, Mouth/Throat, BID  pantoprazole (PROTONIX) injection 40 mg, 40 mg, IntraVENous, Daily **AND** sodium chloride (PF) 0.9 % injection 10 mL, 10 mL, IntraVENous, Daily  propofol injection, 5-50 mcg/kg/min, IntraVENous, Titrated  enoxaparin (LOVENOX) injection 105 mg, 1 mg/kg, Subcutaneous, BID  dexmedetomidine (PRECEDEX) 400 mcg in sodium chloride 0.9 % 100 mL infusion, 0.2-1.4 mcg/kg/hr, IntraVENous, Continuous  guaiFENesin-codeine (GUAIFENESIN AC) 100-10 MG/5ML liquid 5 mL, 5 mL, Oral, Q4H PRN  polyethylene glycol (GLYCOLAX) packet 17 g, 17 g, Oral, Daily  sodium chloride flush 0.9 % injection 5-40 mL, 5-40 mL, IntraVENous, 2 times per day  sodium chloride flush 0.9 % injection 5-40 mL, 5-40 mL, IntraVENous, PRN  0.9 % sodium chloride infusion, 25 mL, IntraVENous, PRN  ondansetron (ZOFRAN-ODT) disintegrating tablet 4 mg, 4 mg, Oral, Q8H PRN **OR**

## 2021-08-26 NOTE — PROGRESS NOTES
Pulmonary & Critical Care Medicine ICU Progress Note  Chief complaint : Severe acute hypoxic respiratory failure    Subjunctive/24 hour events :   Patient seen and examined during multidisciplinary rounds with RN, charge nurse, RT, pharmacy, dietitian, and social service. Currently in prone position, plateau pressure 24, saturation 97%, on 50% and 8 of PEEP,+ bowel movement, urine output 1900 cc, tolerates tube feed, no vomiting. Social History     Tobacco Use    Smoking status: Never Smoker    Smokeless tobacco: Never Used   Substance Use Topics    Alcohol use: Never     History reviewed. No pertinent family history. Recent Labs     08/25/21  2330 08/26/21  0523   PHART 7.378 7.417   PBA2TGY 50* 46*   PO2ART 89* 74*       MV Settings:  Vent Mode: AC/VC Rate Set: 30 bmp/Vt Ordered: 480 mL/ /FiO2 : 50 %           IV:   DOPamine Stopped (08/25/21 1110)    norepinephrine      fentaNYL (SUBLIMAZE) infusion 200 mcg/hr (08/26/21 0612)    propofol 35 mcg/kg/min (08/26/21 0532)    dexmedetomidine 0.6 mcg/kg/hr (08/26/21 0609)    sodium chloride      dextrose         Vitals:  BP (!) 142/64   Pulse 74   Temp 97.3 °F (36.3 °C) (Bladder)   Resp 25   Ht 6' (1.829 m)   Wt 242 lb 8.1 oz (110 kg)   SpO2 97%   BMI 32.89 kg/m²    Tmax:        Intake/Output Summary (Last 24 hours) at 8/26/2021 0843  Last data filed at 8/26/2021 0400  Gross per 24 hour   Intake 3675 ml   Output 2150 ml   Net 1525 ml       EXAM:  General: Sedated, on vent  Head: normocephalic, atraumatic  Eyes:No gross abnormalities. ENT:  MMM no lesions,  ET tube and OG tube  Neck:  supple and no masses  Chest : Good air movement, no rales, no wheezing, nontender, tympanic  Heart[de-identified] Heart sounds are normal.  Regular rate and rhythm without murmur, gallop or rub.   ABD:  symmetric, soft, non-tender, no guarding or rebound  Musculoskeletal : no cyanosis, no clubbing and no edema  Neuro: Sedated, did not wake up or follow commands,  Skin: No 08/26/21  0540   AST 17 17 11   ALT 13 20 16   BILITOT 0.3 0.4 0.3   ALKPHOS 137* 133* 114*     PT/INR: No results for input(s): PROTIME, INR in the last 72 hours. APTT: No results for input(s): APTT in the last 72 hours. UA:No results for input(s): NITRITE, COLORU, PHUR, LABCAST, WBCUA, RBCUA, MUCUS, TRICHOMONAS, YEAST, BACTERIA, CLARITYU, SPECGRAV, LEUKOCYTESUR, UROBILINOGEN, BILIRUBINUR, BLOODU, GLUCOSEU, AMORPHOUS in the last 72 hours. Invalid input(s): KETONESU    Cultures:  Gram-negative sanjana in sputum  Films:  Chest x-ray today, ET tube in good position, evolving right lower lobe infiltrate and congestion  Assessment:   This is a critically ill patient at risk of deterioration / death , needing close ICU monitoring and intervention due to below noted problems     · Severe acute hypoxic respiratory failure  · Severe COVID-19 infection   · Bacterial coinfection, gram-negative rods    · Sepsis, and septic shock, patient being placed on Levophed on and off overnight restarting Levophed today  · Hyperglycemia  · Hypercoagulable state    Recommendations  · Vent support lung protective strategy  · head of the bed 30°  · Supine position later today  · Chest x-ray  · Breathing trial later today  · Sedation with combination propofol and fentanyl target R ASS of 0 to -1  · Watch for ICU delirium: TV on, natural light, avoid benzos, pain control, early mobility, and family engagement  · PUD prophylaxis  · Continue Lovenox therapeutic dose  · Supine and breathing trial today  · DC dexamethasone patient completed 10 days of treatment  · resp cultures  · Continue Zosyn  · Levophed to maintain mean arterial pressure 65-70  · Completed remdesivir treatment  · Target blood sugar 140-180,   · Appreciate endocrinology  · Monitor urine output and renal function  · DVT prophylaxis  · meatneb TID              Due to the immediate potential for life-threatening deterioration due to acute hypoxic respiratory failure, I spent 35 minutes providing critical care. This time is excluding time spent performing procedures.               Electronically signed by Ta Kessler MD,  [facility] ,on 8/26/2021 at 8:43 AM

## 2021-08-27 NOTE — PLAN OF CARE
Nutrition Problem #1: Altered nutrition-related lab values  Intervention: Food and/or Nutrient Delivery: Modify Tube Feeding (Change TF to better meet nutritional needs now that K and Phophorus are improved. Peptide based high protein TF ( Vital HP) is also  lower ion carbohydrate. 20 m/hr  24 hrs via OG. + 3 protein modulars via OG.  Continue with 100 ml water flush 4 x daily)  Nutritional Goals: New goals : En to deliver kcals/protien within range of estimated needs, iimproved renal labs/glucose

## 2021-08-27 NOTE — PROGRESS NOTES
--  108*  --   --   --  112*   CO2 25  --  29  --   --   --  32*   BUN 40*  --  29*  --   --   --  23*   CREATININE 0.98   < > 0.63*   < > 0.9 1.1 0.85   GLUCOSE 366*  --  259*  --   --   --  160*   PHOS 3.4  --  2.7  --   --   --  3.3   MG 2.4  --   --   --   --   --   --     < > = values in this interval not displayed. PT/INR:No results for input(s): PROTIME, INR in the last 72 hours. APTT:No results for input(s): APTT in the last 72 hours. LIVER PROFILE:  Recent Labs     08/25/21  0502 08/26/21  0540 08/27/21  0555   AST 17 11 14   ALT 20 16 15   BILITOT 0.4 0.3 <0.2   ALKPHOS 133* 114* 109*       Imaging Last 24 Hours:  XR CHEST PORTABLE    Result Date: 8/26/2021  EXAMINATION: XR CHEST PORTABLE CLINICAL HISTORY: RESPIRATORY FAILURE COMPARISONS: AUGUST 24, 2021 FINDINGS: Tip of endotracheal tube 3.4 cm superior to ej. Nasogastric tube courses beneath diaphragm and into stomach. Right jugular vein central line tip in superior vena cava. Osseous structures intact. Cardiopericardial silhouette normal. Ill-defined areas of increased opacity found predominantly in bilateral lower lung zones, and to lesser extent, mid and upper lung zones. INTERSTITIAL AND ALVEOLAR EDEMA VERSUS ATELECTASIS/PNEUMONIA AS DISCUSSED. Assessment//Plan           Hospital Problems         Last Modified POA    Pneumonia due to COVID-19 virus 8/17/2021 Yes    New onset type 2 diabetes mellitus ([address] 8/10/2021 Yes    Hypoxia 8/10/2021 Yes    Acute respiratory failure with hypoxia ([address] 8/22/2021 Yes        Assessment:    Condition: In serious condition. Worsening. (Uncontrolled type 2 diabetes new onset type 2 diabetes  Respiratory failure due to COVID-19 pneumonia intubated  Hypoxia).      Plan:   (Continue Lantus 35 units twice a day plus Humalog 4 units with each meals  Continue NG tube feeding  Continue Decadron (Actemra Zon  Critical care infectious disease on consult  Patient seen through glass window  Virtual visit

## 2021-08-27 NOTE — PROGRESS NOTES
Shift Summary    Patient remains intubated and proned. Iv sedation in place. Febrile this shift, tmax 39. 5. prn tylenol suppository given x2, order updated to q4hr prn per dr Franck Burks. Tolerating tube feed well. At start of shift, patient became tachycardic, hr 130's, iv drips titrated accordingly, see mar.

## 2021-08-27 NOTE — PROGRESS NOTES
Department of Internal Medicine  General Internal Medicine  Attending Progress Note      SUBJECTIVE:  Pt seen through ICU door to reduce exposure to COVID-19. Pt intubated and in the supine position.     OBJECTIVE      Medications    Current Facility-Administered Medications: acetaminophen (TYLENOL) tablet 650 mg, 650 mg, Oral, Q4H PRN **OR** acetaminophen (TYLENOL) suppository 650 mg, 650 mg, Rectal, Q4H PRN  norepinephrine (LEVOPHED) 16 mg in dextrose 5% 250 mL infusion, 2-100 mcg/min, IntraVENous, Continuous  atropine 1 MG/10ML injection, , ,   EPINEPHrine 1 MG/10ML injection, , ,   insulin glargine (LANTUS) injection vial 35 Units, 35 Units, Subcutaneous, BID  insulin lispro (HUMALOG) injection vial 4 Units, 4 Units, Subcutaneous, 4x Daily  docusate (COLACE) 50 MG/5ML liquid 100 mg, 100 mg, Per NG tube, BID  insulin lispro (HUMALOG) injection vial 0-12 Units, 0-12 Units, Subcutaneous, Q4H  fentaNYL (SUBLIMAZE) 1,000 mcg in sodium chloride 0.9 % 100 mL infusion, 12.5-200 mcg/hr, IntraVENous, Continuous  piperacillin-tazobactam (ZOSYN) 3,375 mg in dextrose 5 % 50 mL IVPB extended infusion (mini-bag), 3,375 mg, IntraVENous, Q8H  chlorhexidine (PERIDEX) 0.12 % solution 15 mL, 15 mL, Mouth/Throat, BID  pantoprazole (PROTONIX) injection 40 mg, 40 mg, IntraVENous, Daily **AND** sodium chloride (PF) 0.9 % injection 10 mL, 10 mL, IntraVENous, Daily  propofol injection, 5-50 mcg/kg/min, IntraVENous, Titrated  enoxaparin (LOVENOX) injection 105 mg, 1 mg/kg, Subcutaneous, BID  dexmedetomidine (PRECEDEX) 400 mcg in sodium chloride 0.9 % 100 mL infusion, 0.2-1.4 mcg/kg/hr, IntraVENous, Continuous  guaiFENesin-codeine (GUAIFENESIN AC) 100-10 MG/5ML liquid 5 mL, 5 mL, Oral, Q4H PRN  polyethylene glycol (GLYCOLAX) packet 17 g, 17 g, Oral, Daily  sodium chloride flush 0.9 % injection 5-40 mL, 5-40 mL, IntraVENous, 2 times per day  sodium chloride flush 0.9 % injection 5-40 mL, 5-40 mL, IntraVENous, PRN  0.9 % sodium chloride respiratory failure 2/2 COVID-19 pneumonia  - was intubated on 8/22  - sedated, paralyzed. Prone as tolerated  - critical care consulted  - completed course of remdesivir, actemra  - continuing decadron, lovenox  - ID consulted  - Resp Cx with acinetobacter and klebsiella  - empiric zosyn    # New onset diabetes  - cont lantus, ISS  - endocrinology consulted    DVT: lovenox per covid protocol    Disposition: Pt continues to be on vent. Critical care managing. Continuing decadron, zosyn. ID consulted.        Dora Almazan, DO  Internal Medicine

## 2021-08-27 NOTE — PROGRESS NOTES
Comprehensive Nutrition Assessment    Type and Reason for Visit:  Reassess    Nutrition Recommendations/Plan:   Change TF to better meet nutritional needs now that K and Phophorus are improved. Peptide based high protein TF ( Vital HP) is also lower in carbohydrate. Continue @ 20 m/hr  X 24 hrs  due to proning schedule via OG. Add 3 protein modulars via OG. Continue with 100 ml water flush 4 x daily  Monitor for changes in propofol rate for adjustments to EN goal rate    Nutrition Assessment:  Pt continues to meet criteria for moderate malnutrition related to acute illness, continues to receive suboptimal nutrition support related to proning schedule with COVID-19, Renal labs improved, but glucose remains poorly controlled, will modify tube feeding    Malnutrition Assessment:  Malnutrition Status: Moderate malnutrition    Context:  Acute Illness     Findings of the 6 clinical characteristics of malnutrition:  Energy Intake:  7 - 50% or less of estimated energy requirements for 5 or more days  Weight Loss:  1 - 5% over 1 month     Body Fat Loss:  Unable to assess     Muscle Mass Loss:  Unable to assess    Fluid Accumulation:  Unable to assess     Strength:  Not Performed    Estimated Daily Nutrient Needs:  Energy (kcal):  4621-7654 kcals @ 11-14 kca;/kg; Weight Used for Energy Requirements:  Current (110 kg)     Protein (g):  ~ 162 g protein @ 2 g/kg IBW; Weight Used for Protein Requirements:  Ideal (81 kg)        Fluid (ml/day):  ~2268 ml @ 28 m/l/kg IBW; Method Used for Fluid Requirements:  ml/Kg (81 kg)      Nutrition Related Findings:  intubated 8/22, day 5 troph TF, on proning schedule, had vomiting ( 8/24) with TF held, relgan added, appears resolved, BM ( 8/26) after lactulose.  no further reports of TF intolerance although nursing reprots abdomen in taunt and disteneded, I/O 2871=2754, 2+ generalized edema continues, current propofol @ 23 ( ~ 600), glucose poorly controlled , phosphorus Evaluation:     Food/Nutrient Intake Outcomes:  Enteral Nutrition Intake/Tolerance  Physical Signs/Symptoms Outcomes:  Biochemical Data, Constipation, GI Status, Hemodynamic Status, Weight, Fluid Status or Edema     Discharge Planning:     Too soon to determine     Electronically signed by Patience Kong RD, LD on 8/27/21 at 2:10 PM EDT

## 2021-08-27 NOTE — PROGRESS NOTES
Pulmonary & Critical Care Medicine ICU Progress Note  Chief complaint : Severe acute hypoxic respiratory failure    Subjunctive/24 hour events :   Patient seen and examined during multidisciplinary rounds with RN, charge nurse, RT, pharmacy, dietitian, and social service. In prone position, failed SBT yesterday , fever 39.3, on 45% Fio2 and 8 of peep and sat 96% , +  BM, UO 1300 cc, not on levophed, no vomiting and tolerates tube feed     Social History     Tobacco Use    Smoking status: Never Smoker    Smokeless tobacco: Never Used   Substance Use Topics    Alcohol use: Never     History reviewed. No pertinent family history. Recent Labs     08/26/21 2334 08/27/21 0515   PHART 7.414 7.389   [id number] 53* 56*   PO2ART 74* 84*       MV Settings:  Vent Mode: AC/VC Rate Set: 30 bmp/Vt Ordered: 480 mL/ /FiO2 : 45 %           IV:   DOPamine Stopped (08/25/21 1110)    norepinephrine Stopped (08/27/21 0549)    fentaNYL (SUBLIMAZE) infusion 125 mcg/hr (08/27/21 0219)    propofol 35 mcg/kg/min (08/27/21 0549)    dexmedetomidine 1.4 mcg/kg/hr (08/27/21 0549)    sodium chloride      dextrose         Vitals:  BP (!) 142/64   Pulse 68   Temp 102 °F (38.9 °C) (Bladder)   Resp 30   Ht 6' (1.829 m)   Wt 242 lb 8.1 oz (110 kg)   SpO2 96%   BMI 32.89 kg/m²    Tmax:        Intake/Output Summary (Last 24 hours) at 8/27/2021 0944  Last data filed at 8/27/2021 0533  Gross per 24 hour   Intake 2278 ml   Output 1350 ml   Net 928 ml       EXAM:  General: Sedated, on vent, prone   Head: normocephalic, atraumatic  Eyes:No gross abnormalities. ENT:  MMM no lesions,  ET tube and OG tube  Neck:  supple and no masses  Chest : Good air movement, no rales, no wheezing, nontender, tympanic  Heart[de-identified] Heart sounds are normal.  Regular rate and rhythm without murmur, gallop or rub.   ABD:  symmetric, soft, non-tender, no guarding or rebound  Musculoskeletal : no cyanosis, no clubbing and no edema  Neuro: Sedated, did not wake up or follow commands,  Skin: No rashes or nodules noted. Lymph node:  no cervical nodes  Urology: Yes [facility]   Psychiatric: Sedated on vent    Medications:  Scheduled Meds:   insulin glargine  35 Units Subcutaneous BID    insulin lispro  4 Units Subcutaneous 4x Daily    docusate  100 mg Per NG tube BID    insulin lispro  0-12 Units Subcutaneous Q4H    piperacillin-tazobactam  3,375 mg IntraVENous Q8H    chlorhexidine  15 mL Mouth/Throat BID    pantoprazole  40 mg IntraVENous Daily    And    sodium chloride (PF)  10 mL IntraVENous Daily    enoxaparin  1 mg/kg Subcutaneous BID    polyethylene glycol  17 g Oral Daily    sodium chloride flush  5-40 mL IntraVENous 2 times per day    traZODone  100 mg Oral Nightly       PRN Meds:  acetaminophen **OR** acetaminophen, guaiFENesin-codeine, sodium chloride flush, sodium chloride, ondansetron **OR** ondansetron, polyethylene glycol, potassium chloride, magnesium sulfate, ipratropium-albuterol, hydrALAZINE, glucose, dextrose, glucagon (rDNA), dextrose    Results: reviewed by me   CBC:   Recent Labs     08/25/21  0510 08/25/21  0609 08/26/21  0539 08/26/21  1121 08/26/21  2334 08/27/21  0515 08/27/21  0526   WBC 14.3*  --  9.7  --   --   --  7.9   HGB 14.9   < > 13.4*   < > 13.4* 13.1* 13.0*   HCT 45.3  --  40.7*  --   --   --  40.4*   MCV 89.4  --  88.5  --   --   --  89.6     --  222  --   --   --  249    < > = values in this interval not displayed. BMP:   Recent Labs     08/25/21  0502 08/25/21  0609 08/26/21  0540 08/26/21  1121 08/26/21  2334 08/27/21  0515 08/27/21  0555     --  147*  --   --   --  152*   K 4.1  4.1   < > 3.6  3.6  --   --   --  4.3  4.3     --  108*  --   --   --  112*   CO2 25  --  29  --   --   --  32*   PHOS 3.4  --  2.7  --   --   --  3.3   BUN 40*  --  29*  --   --   --  23*   CREATININE 0.98   < > 0.63*   < > 0.9 1.1 0.85    < > = values in this interval not displayed.      LIVER PROFILE:   Recent Labs 08/25/21  0502 08/26/21  0540 08/27/21  0555   AST 17 11 14   ALT 20 16 15   BILITOT 0.4 0.3 <0.2   ALKPHOS 133* 114* 109*     PT/INR: No results for input(s): PROTIME, INR in the last 72 hours. APTT: No results for input(s): APTT in the last 72 hours. UA:No results for input(s): NITRITE, COLORU, PHUR, LABCAST, WBCUA, RBCUA, MUCUS, TRICHOMONAS, YEAST, BACTERIA, CLARITYU, SPECGRAV, LEUKOCYTESUR, UROBILINOGEN, BILIRUBINUR, BLOODU, GLUCOSEU, AMORPHOUS in the last 72 hours. Invalid input(s): KETONESU    Cultures:  Gram-negative sanjana in sputum  Films:  Chest x-ray today, ET tube in good position, evolving right lower lobe infiltrate and congestion  Assessment:   This is a critically ill patient at risk of deterioration / death , needing close ICU monitoring and intervention due to below noted problems     · Severe acute hypoxic respiratory failure  · Severe COVID-19 infection   · Bacterial coinfection, gram-negative rods    · Sepsis, and septic shock, patient being placed on Levophed on and off overnight restarting Levophed today  · Hyperglycemia  · Hypercoagulable state    Recommendations  · Vent support lung protective strategy  · head of the bed 30°  · Supine position later today  · Breathing trial later today  · Sedation with combination propofol and fentanyl target R ASS of 0 to -1  · Watch for ICU delirium: TV on, natural light, avoid benzos, pain control, early mobility, and family engagement  · PUD prophylaxis  · Continue Lovenox therapeutic dose  · Prone for 18 hours in supine for 6  · Failed breathing trial today  · Patient completed 10 days of dexamethasone  · resp cultures  · Continue Zosyn  · Consider diflucan empirically will differ to ID   · Repeat cultures     · Levophed to maintain mean arterial pressure 65-70  · Completed remdesivir treatment   · Adjust water flushes, will increase water flushes to 300 cc daily 2 hours  · Please adjust water flushes tomorrow depending on sodium levels  · Target blood sugar 140-180,   · Appreciate endocrinology  · Monitor urine output and renal function  · DVT prophylaxis  · meatneb TID              Due to the immediate potential for life-threatening deterioration due to acute hypoxic respiratory failure, I spent 39   minutes providing critical care. This time is excluding time spent performing procedures.               Electronically signed by Beatriz Pimentel MD,  [facility] ,on 8/27/2021 at 9:44 AM

## 2021-08-27 NOTE — PROGRESS NOTES
Infectious Disease     Patient Name: Guera Mc  Date: 8/27/2021  YOB: 1965  Medical Record Number: [id number]      COVID-19 pneumonia      Worsened requiring intubation mechanical ventilation found to have pulmonary emboli        Remains intubated 45% FiO2 8 PEEP   98% sat  Tolerating some weaning  Fevers    Chest x-ray shows persistent bilateral alveolar infiltrates somewhat improved from 8/22/2021    Review of Systems   Unable to perform ROS: Intubated       Prone ventilation   Physical Exam  Constitutional:       Appearance: He is ill-appearing. Cardiovascular:      Heart sounds: Normal heart sounds. No murmur heard. Pulmonary:      Effort: Pulmonary effort is normal.      Breath sounds: No wheezing, rhonchi or rales. Abdominal:      General: Abdomen is flat. There is no distension. Palpations: There is no mass. Tenderness: There is no abdominal tenderness. There is no guarding. Musculoskeletal:      Right lower leg: No edema. Left lower leg: No edema. Blood pressure (!) 142/64, pulse 74, temperature 98.2 °F (36.8 °C), resp. rate 30, height 6' (1.829 m), weight 242 lb 8.1 oz (110 kg), SpO2 96 %. .   Lab Results   Component Value Date    WBC 7.9 08/27/2021    HGB 14.1 08/27/2021    HCT 40.4 (L) 08/27/2021    MCV 89.6 08/27/2021     08/27/2021     Lab Results   Component Value Date     08/27/2021    K 4.3 08/27/2021    K 4.3 08/27/2021     08/27/2021    CO2 32 08/27/2021    BUN 23 08/27/2021    CREATININE 1.2 08/27/2021    CREATININE 0.85 08/27/2021    GLUCOSE 160 08/27/2021    CALCIUM 8.6 08/27/2021      EXAMINATION: CHEST PORTABLE VIEW       CLINICAL HISTORY: Increasing short of breath. Correlate. Follow-up       COMPARISONS: August 10, 2021       FINDINGS:       Single  views of the chest is submitted.  The cardiac silhouette is enlarged   Pulmonary vascular unremarkable. Right sided trachea.    Persistent multifocal to patchy glass infiltrates throughout the lung parenchyma.  No Pneumothoraces.                                                                                        Impression   PERSISTENT PATCHY BILATERAL MULTIFOCAL TO GLASS INFILTRATES THROUGHOUT THE LUNG PARENCHYMA     CULTURE, RESPIRATORY 08/24/2021  5:40 AM  - [facility]   Moderate growth   ID and sensitivity to follow    Organism Abnormal  08/24/2021  5:40 AM MH - [facility]   Gram negative sanjana    CULTURE, RESPIRATORY 08/24/2021  5:40 AM [facility] - [facility]           Acinetobacter baumannii (1)    Antibiotic Interpretation YESSICA Status    ampicillin-sulbactam Sensitive <=2 mcg/mL     cefepime Sensitive 2 mcg/mL     cefTRIAXone Sensitive 8 mcg/mL     ciprofloxacin Sensitive <=0.25 mcg/mL     gentamicin Sensitive <=1 mcg/mL     imipenem Sensitive <=0.25 mcg/mL     tobramycin Sensitive <=1 mcg/mL     Klebsiella aerogenes (2)    Antibiotic Interpretation YESSICA Status    amoxicillin-clavulanate Resistant >=32 mcg/mL     ceFAZolin Resistant >=64 mcg/mL     cefTRIAXone Sensitive <=1 mcg/mL     ciprofloxacin Sensitive <=0.25 mcg/mL     gentamicin Sensitive <=1 mcg/mL     trimethoprim-sulfamethoxazole Sensitive <=20 mcg/mL               PLAN:    COVID-19 pneumonia  Intubated secondary to PE  Empirically on Zosyn which will cover Acinetobacter and Klebsiella from sputum

## 2021-08-28 NOTE — PROGRESS NOTES
Shift summary    Shift assessment performed. Patient remains intubated and sedated at this time. Patient on Levophed, fentanyl, and propofol. Temp 37. 7. patient tolerating tube feeding, oral care performed often due to thick tan secretions. Patient does wake up and follow commands if sedation is paused.  Patient requires max of fentanyl and propofol to sedate- precedex added by Dr. Amalia Pillai but not yet started at this time (time 1500) due to patient resting comfortably on current level of sedation (Fentanyl 200mcg/hr, propofol 50mcg/kg/min)

## 2021-08-28 NOTE — CONSULTS
Consults    Patient Name: Chris Goodman Date: 8/10/2021  9:13 AM  MR #: [id number]  : 1965    Attending Physician: Sheri Garcia DO  Reason for consult: Bradycardia    History of Presenting Illness:      Maritza Montoya is a 61 y.o. male on hospital day 25 with a history of . History Obtained From:  patient, electronic medical record    Pt admitted with COVID. Pt is intubated. OG tube inplace. Pt has no currentl Fever. On Levo for HD support. We are asked to evaluate due to TachyBrady. HR goes from 40s to 120s. Many APCs and PVCs. No known prior CAD   History:      EKG: SR  Past Medical History:   Diagnosis Date    New onset type 2 diabetes mellitus ([facility] [address]      Past Surgical History:   Procedure Laterality Date    SCAPULA SURGERY Left        Family History  History reviewed. No pertinent family history. [] Unable to obtain due to ventilated and/ or neurologic status    Social History     Socioeconomic History    Marital status: Unknown     Spouse name: Not on file    Number of children: Not on file    Years of education: Not on file    Highest education level: Not on file   Occupational History    Not on file   Tobacco Use    Smoking status: Never Smoker    Smokeless tobacco: Never Used   Vaping Use    Vaping Use: Never used   Substance and Sexual Activity    Alcohol use: Never    Drug use: Never    Sexual activity: Never   Other Topics Concern    Not on file   Social History Narrative    Not on file     Social Determinants of Health     Financial Resource Strain:     Difficulty of Paying Living Expenses:    Food Insecurity:     Worried About [address] in the Last Year:     [address] in the Last Year:    Transportation Needs:     Lack of Transportation (Medical):      Lack of Transportation (Non-Medical):    Physical Activity:     Days of Exercise per Week:     Minutes of Exercise per Session:    Stress:     Feeling of Stress :    Social Connections:  Frequency of Communication with Friends and Family:     Frequency of Social Gatherings with Friends and Family:     Attends [employer] Services:     Active Member of Clubs or Organizations:     Attends Club or Organization Meetings:     Marital Status:    Intimate Partner Violence:     Fear of Current or Ex-Partner:     Emotionally Abused:     Physically Abused:     Sexually Abused:       [] Unable to obtain due to ventilated and/ or neurologic status      Home Medications:      Medications Prior to Admission: aspirin 81 MG EC tablet, Take 81 mg by mouth daily  Naproxen Sodium (ALEVE) 220 MG CAPS, Take 220 mg by mouth as needed for Pain    Current Hospital Medications:     Scheduled Meds:   insulin glargine  35 Units Subcutaneous BID    insulin lispro  4 Units Subcutaneous 4x Daily    docusate  100 mg Per NG tube BID    insulin lispro  0-12 Units Subcutaneous Q4H    piperacillin-tazobactam  3,375 mg IntraVENous Q8H    chlorhexidine  15 mL Mouth/Throat BID    pantoprazole  40 mg IntraVENous Daily    And    sodium chloride (PF)  10 mL IntraVENous Daily    enoxaparin  1 mg/kg Subcutaneous BID    polyethylene glycol  17 g Oral Daily    sodium chloride flush  5-40 mL IntraVENous 2 times per day    traZODone  100 mg Oral Nightly     Continuous Infusions:   dexmedetomidine      norepinephrine Stopped (08/28/21 0839)    fentaNYL (SUBLIMAZE) infusion 200 mcg/hr (08/28/21 0728)    propofol 50 mcg/kg/min (08/28/21 0851)    sodium chloride      dextrose       PRN Meds:.acetaminophen **OR** acetaminophen, guaiFENesin-codeine, sodium chloride flush, sodium chloride, ondansetron **OR** ondansetron, polyethylene glycol, potassium chloride, magnesium sulfate, ipratropium-albuterol, hydrALAZINE, glucose, dextrose, glucagon (rDNA), dextrose  .    dexmedetomidine      norepinephrine Stopped (08/28/21 0839)    fentaNYL (SUBLIMAZE) infusion 200 mcg/hr (08/28/21 0728)    propofol 50 mcg/kg/min (08/28/21 0611)    sodium chloride      dextrose          Allergies:     No Known Allergies     Review of Systems:       Review of Systems    Vented  Objective Findings:     Vitals:BP (!) 142/64   Pulse 79   Temp 96.6 °F (35.9 °C) (Bladder)   Resp 30   Ht 6' (1.829 m)   Wt 242 lb 8.1 oz (110 kg)   SpO2 97%   BMI 32.89 kg/m²      Physical Examination:    Physical Exam   Constitutional: No distress. He appears acutely ill. HENT:   Normal cephalic and Atraumatic   Eyes: Pupils are equal, round, and reactive to light. Neck: Thyroid normal. No JVD present. No neck adenopathy. No thyromegaly present. Cardiovascular: Normal rate, regular rhythm, normal heart sounds, intact distal pulses and normal pulses. Pulmonary/Chest: Effort normal and breath sounds normal. He has no wheezes. He has no rales. He exhibits no tenderness. Abdominal: Soft. Bowel sounds are normal. There is no abdominal tenderness. Musculoskeletal:         General: No tenderness or edema. Normal range of motion. Cervical back: Normal range of motion and neck supple. Skin: Skin is warm. No cyanosis. Nails show no clubbing.          Results/ Medications reviewed 8/28/2021, 11:15 AM     Laboratory, Microbiology, Pathology, Radiology, Cardiology, Medications and Transcriptions reviewed  Scheduled Meds:   insulin glargine  35 Units Subcutaneous BID    insulin lispro  4 Units Subcutaneous 4x Daily    docusate  100 mg Per NG tube BID    insulin lispro  0-12 Units Subcutaneous Q4H    piperacillin-tazobactam  3,375 mg IntraVENous Q8H    chlorhexidine  15 mL Mouth/Throat BID    pantoprazole  40 mg IntraVENous Daily    And    sodium chloride (PF)  10 mL IntraVENous Daily    enoxaparin  1 mg/kg Subcutaneous BID    polyethylene glycol  17 g Oral Daily    sodium chloride flush  5-40 mL IntraVENous 2 times per day    traZODone  100 mg Oral Nightly     Continuous Infusions:   dexmedetomidine      norepinephrine Stopped (08/28/21 0839)   [facility] fentaNYL (SUBLIMAZE) infusion 200 mcg/hr (08/28/21 0728)    propofol 50 mcg/kg/min (08/28/21 0851)    sodium chloride      dextrose         Recent Labs     08/26/21  0539 08/26/21  1121 08/27/21  0526 08/27/21  1103 08/28/21  0428 08/28/21  0548 08/28/21  1102   WBC 9.7  --  7.9  --   --  13.3*  --    HGB 13.4*   < > 13.0*   < > 12.3* 12.8* 12.3*   HCT 40.7*  --  40.4*  --   --  39.6*  --    MCV 88.5  --  89.6  --   --  89.0  --      --  249  --   --  266  --     < > = values in this interval not displayed. Recent Labs     08/26/21  0540 08/26/21  1121 08/27/21  0555 08/27/21  1103 08/28/21  0428 08/28/21  0549 08/28/21  1102   *  --  152*  --   --  152*  --    K 3.6  3.6  --  4.3  4.3  --   --  3.5  3.5  --    *  --  112*  --   --  112*  --    CO2 29  --  32*  --   --  31  --    PHOS 2.7  --  3.3  --   --  2.8  --    BUN 29*  --  23*  --   --  20  --    CREATININE 0.63*   < > 0.85   < > 0.7* 0.55* 0.7*    < > = values in this interval not displayed. Recent Labs     08/26/21  0540 08/27/21  0555 08/28/21  0549   AST 11 14 36   ALT 16 15 34   BILITOT 0.3 <0.2 0.4   ALKPHOS 114* 109* 110*     No results for input(s): LIPASE, AMYLASE in the last 72 hours. Recent Labs     08/26/21  0540 08/27/21  0555 08/28/21  0549   PROT 5.4* 5.8* 5.9*     CTA CHEST W WO CONTRAST    Result Date: 8/22/2021  The EXAMINATION: CT scan of the chest with contrast (pulmonary embolism protocol) INDICATION: Respiratory failure COMPARISON: None TECHNIQUE: Helical CT was performed through the chest utilizing 100 cc of Isovue-300 intravenous contrast.  Images were obtained with bolus tracking in order to opacify the pulmonary arteries. Both MIP and 3D volume rendered reconstructions were performed.  FINDINGS: Findings are positive for pulmonary emboli within the proximal segmental right lower lobe pulmonary artery as well as the junction between the main left pulmonary artery and the proximal left lower lobe and proximal segmental pulmonary arteries. There is diffuse to confluent airspace disease throughout the lung parenchyma. No pleural effusion. No pneumothoraces. Within the field-of-view of the abdomen visualized abdominal contents are unremarkable. There is multilevel degenerative changes of the thoracic spine     1. FINDINGS ARE POSITIVE FOR PULMONARY EMBOLI AS DESCRIBED ABOVE. 2. THERE IS DIFFUSE TO CONFLUENT AIRSPACE DISEASE THROUGHOUT THE LUNG PARENCHYMA. COMMONLY REPORT IMAGING FEATURES OF (COVID-19) PNEUMONIA ARE PRESENT. OTHER PROCESSES SUCH AS INFLUENZA, PNEUMONIA AND ORGANIZING PNEUMONIA AS CAN BE SEEN WITH DRUG TOXICITY AND CONNECTIVE TISSUE DISEASE CAN CAUSE A SIMILAR IMAGING PATTERN. PATRICIO THE NURSE CARING FOR THE PATIENT WAS NOTIFIED IMMEDIATELY ABOVE FINDINGS UPON AUGUST 26, 2021 AT 9741 HOURS All CT scans at this facility use dose modulation, iterative reconstruction, and/or weight based dosing when appropriate to reduce radiation dose to as low as reasonably achievable. XR CHEST PORTABLE    Result Date: 8/26/2021  EXAMINATION: XR CHEST PORTABLE CLINICAL HISTORY: RESPIRATORY FAILURE COMPARISONS: AUGUST 24, 2021 FINDINGS: Tip of endotracheal tube 3.4 cm superior to ej. Nasogastric tube courses beneath diaphragm and into stomach. Right jugular vein central line tip in superior vena cava. Osseous structures intact. Cardiopericardial silhouette normal. Ill-defined areas of increased opacity found predominantly in bilateral lower lung zones, and to lesser extent, mid and upper lung zones. INTERSTITIAL AND ALVEOLAR EDEMA VERSUS ATELECTASIS/PNEUMONIA AS DISCUSSED. XR CHEST PORTABLE    Result Date: 8/24/2021  EXAMINATION: XR CHEST PORTABLE CLINICAL HISTORY: RESPIRATORY FAILURE COMPARISONS: CT CHEST, AUGUST 22, 2021, CHEST RADIOGRAPH, SAME DATE FINDINGS: Tip of endotracheal tube, 8.4 cm superior to ej. Nasogastric tube courses beyond the inferior extent of films beneath diaphragm.  Tip of right jugular vein central line in superior vena cava. Osseous structures intact. Cardiopericardial silhouette upper limits of normal. Ill-defined reticular and alveolar opacities visualized in the right lower, and left mid and left lower lung zones. BORDERLINE CARDIOMEGALY WITH BILATERAL ATELECTASIS/PNEUMONIA VERSUS EDEMA. XR CHEST PORTABLE    Result Date: 8/22/2021  EXAMINATION: XR CHEST PORTABLE, XR CHEST PORTABLE. DATE AND TIME:8/22/2021 7:31 AM CLINICAL HISTORY: Shortness of breath   COVID  COMPARISONS: August 20. FINDINGS: Persistent bilateral airspace opacities with no significant change. No pneumothorax. No definite effusion. Extensive bilateral airspace infiltrates EXAMINATION: XR CHEST PORTABLE, XR CHEST PORTABLE. DATE AND TIME:8/22/2021 at 07;37 hours. CLINICAL HISTORY: Shortness of breath   COVID  COMPARISONS: None  FINDINGS: There now is an NG tube seen coursing into the stomach. Right CVP line placed with tip in the superior vena cava. No pneumothorax. ET tube in position with its tip at the level of the T1 vertebral body. Persistent bilateral airspace opacities. IMPRESSION:  Tubes and life lines in position. Persistent bilateral airspace infiltrates     XR CHEST PORTABLE    Result Date: 8/22/2021  EXAMINATION: XR CHEST PORTABLE, XR CHEST PORTABLE. DATE AND TIME:8/22/2021 7:31 AM CLINICAL HISTORY: Shortness of breath   COVID  COMPARISONS: August 20. FINDINGS: Persistent bilateral airspace opacities with no significant change. No pneumothorax. No definite effusion. Extensive bilateral airspace infiltrates EXAMINATION: XR CHEST PORTABLE, XR CHEST PORTABLE. DATE AND TIME:8/22/2021 at 07;37 hours. CLINICAL HISTORY: Shortness of breath   COVID  COMPARISONS: None  FINDINGS: There now is an NG tube seen coursing into the stomach. Right CVP line placed with tip in the superior vena cava. No pneumothorax. ET tube in position with its tip at the level of the T1 vertebral body.  Persistent bilateral airspace opacities. IMPRESSION:  Tubes and life lines in position. Persistent bilateral airspace infiltrates     XR CHEST PORTABLE    Result Date: 8/20/2021  Exam: XR CHEST PORTABLE History:  resp failure Technique: AP portable view of the chest obtained. Comparison: Chest x-ray from August 18, 2021 Chest x-ray portable Findings: The cardiomediastinal silhouette is within normal limits. There are bilateral patchy alveolar opacities predominantly in the lung bases obscuring the heart borders. . Bones of the thorax appear intact. There are patchy alveolar opacities in both lungs worrisome for viral infiltrates, pneumonia     XR CHEST PORTABLE    Result Date: 8/18/2021  EXAMINATION: CHEST PORTABLE VIEW  CLINICAL HISTORY: Increasing short of breath. Correlate. Follow-up COMPARISONS: August 10, 2021  FINDINGS: Single  views of the chest is submitted. The cardiac silhouette is enlarged Pulmonary vascular unremarkable. Right sided trachea. Persistent multifocal to patchy glass infiltrates throughout the lung parenchyma. No Pneumothoraces. PERSISTENT PATCHY BILATERAL MULTIFOCAL TO GLASS INFILTRATES THROUGHOUT THE LUNG PARENCHYMA    XR CHEST PORTABLE    Result Date: 8/10/2021  EXAMINATION: XR CHEST PORTABLE CLINICAL HISTORY: SHORTNESS OF BREATH COMPARISONS: None available. FINDINGS: Patient leaning to left. Osseous structures intact. Cardiopericardial silhouette enlarged. Ill-defined areas of increased opacity are found in the right upper and right lower lung, with air bronchogram in the right lower lung. Air bronchogram increased opacity is also found in the left lower lung, and to lesser degree the left midlung zone. BILATERAL ATELECTASIS/PNEUMONIA.  CARDIOMEGALY    US DUP UPPER EXTREMITY RIGHT VENOUS    Result Date: 8/10/2021  EXAMINATION: BILATERAL UPPER EXTREMITY DEEP VENOUS ULTRASOUND WITH DOPPLER IMAGING CLINICAL HISTORY:  Upper extremity swelling COMPARISON: None TECHNIQUE:  Starleen Pleasure scale ultrasound with compression maneuvers where accessible, color and spectral Doppler of the internal jugular, subclavian, axillary and brachial veins was performed bilaterally. Grey scale with and without compression of the basilic and cephalic  veins was also performed bilaterally. Images were obtained and stored in a permanent archive. RESULT: Examination limited by intravenous lines. RIGHT UPPER EXTREMITY DEEP VEINS: Internal Jugular vein: Normal compression, normal spontaneous flow, Subclavian vein:  Normal, spontaneous flow, Axillary vein:  Normal compression, normal spontaneous flow, Brachial vein:  Normal compression, normal spontaneous flow, RIGHT UPPER EXTREMITY SUPERFICIAL VEINS: Basilic vein: Normal compression Cephalic vein:  Normal compression LEFT UPPER EXTREMITY DEEP VEINS: Internal Jugular vein: Normal compression, normal spontaneous flow, Subclavian vein:  Normal, spontaneous flow, Axillary vein:  Normal compression, normal spontaneous flow, Brachial vein:  Normal compression, normal spontaneous flow, LEFT UPPER EXTREMITY SUPERFICIAL VEINS: Basilic vein: Normal compression Cephalic vein:  Normal compression EXAMINATION: BILATERAL LOWER EXTREMITY DEEP VENOUS ULTRASOUND WITH DOPPLER IMAGING CLINICAL HISTORY: SWELLING COMPARISON: None TECHNIQUE:  Gray scale with compression maneuvers, Color Doppler and Spectral Doppler at rest and with augmentation of the distal external iliac, common femoral, femoral and popliteal veins was performed. Grey scale with compression maneuvers of the peroneal and posterior tibial veins was performed. The great and small saphenous veins were imaged in grey scale with compression maneuvers at their insertion to the deep system. Imaging was performed in both lower extremities. Images were obtained and stored in a permanent archive.  RESULT: RIGHT LOWER EXTREMITY PROXIMAL DEEP VEINS Distal External Iliac and Common Femoral Veins:      Compression: Normal      Doppler: Normal, spontaneous respirophasic flow                     Normal response to augmentation  Femoral vein:      Compression: Normal      Doppler: Normal, spontaneous flow                     Normal response to augmentation Popliteal vein:      Compression: Normal      Doppler: Normal, spontaneous flow                     Normal response to augmentation CALF DEEP VEINS Peroneal veins: Normal compression Posterior tibial veins: Normal compression Gastrocnemius and Soleal veins: Not imaged SUPERFICIAL VEINS Great saphenous: Patent and compressible at insertion into common femoral vein; not otherwise assessed. Small Saphenous:Patent and compressible in the proximal calf, not otherwise assessed. LEFT LOWER EXTREMITY PROXIMAL DEEP VEINS Distal External Iliac and Common Femoral Veins:      Compression: Normal      Doppler: Normal, spontaneous respirophasic flow                      Normal response to augmentation Femoral vein:      Compression: Normal      Doppler: Normal, spontaneous flow                      Normal response to augmentation  Popliteal vein:      Compression: Normal      Doppler: Normal, spontaneous flow                      Normal response to augmentation CALF DEEP VEINS Peroneal veins: Normal compression Posterior tibial veins: Normal compression Gastrocnemius and Soleal veins: Not imaged SUPERFICIAL VEINS Great saphenous: Patent at insertion into common femoral vein; not otherwise assessed. Small Saphenous: Patent and compressible in the proximal calf, not otherwise assessed. NEGATIVE STUDY FOR ACUTE DVT IN THE RIGHT AND LEFT UPPER EXTREMITIES. NEGATIVE STUDY FOR SUPERFICIAL THROMBOPHLEBITIS IN THE RIGHT AND LEFT  UPPER EXTREMITIES. LIMITED EVALUATION OF THE CALF DUE TO TO PATIENT BODY HABITUS. NEGATIVE STUDY FOR ACUTE PROXIMAL DVT IN THE RIGHT AND LEFT LOWER EXTREMITIES. NEGATIVE STUDY FOR ACUTE CALF DVT IN THE RIGHT AND LEFT LOWER EXTREMITIES. scale with compression maneuvers at their insertion to the deep system. Imaging was performed in both lower extremities. Images were obtained and stored in a permanent archive. RESULT: RIGHT LOWER EXTREMITY PROXIMAL DEEP VEINS Distal External Iliac and Common Femoral Veins:      Compression: Normal      Doppler: Normal, spontaneous respirophasic flow                     Normal response to augmentation  Femoral vein:      Compression: Normal      Doppler: Normal, spontaneous flow                     Normal response to augmentation Popliteal vein:      Compression: Normal      Doppler: Normal, spontaneous flow                     Normal response to augmentation CALF DEEP VEINS Peroneal veins: Normal compression Posterior tibial veins: Normal compression Gastrocnemius and Soleal veins: Not imaged SUPERFICIAL VEINS Great saphenous: Patent and compressible at insertion into common femoral vein; not otherwise assessed. Small Saphenous:Patent and compressible in the proximal calf, not otherwise assessed. LEFT LOWER EXTREMITY PROXIMAL DEEP VEINS Distal External Iliac and Common Femoral Veins:      Compression: Normal      Doppler: Normal, spontaneous respirophasic flow                      Normal response to augmentation Femoral vein:      Compression: Normal      Doppler: Normal, spontaneous flow                      Normal response to augmentation  Popliteal vein:      Compression: Normal      Doppler: Normal, spontaneous flow                      Normal response to augmentation CALF DEEP VEINS Peroneal veins: Normal compression Posterior tibial veins: Normal compression Gastrocnemius and Soleal veins: Not imaged SUPERFICIAL VEINS Great saphenous: Patent at insertion into common femoral vein; not otherwise assessed. Small Saphenous: Patent and compressible in the proximal calf, not otherwise assessed. NEGATIVE STUDY FOR ACUTE DVT IN THE RIGHT AND LEFT UPPER EXTREMITIES.  NEGATIVE STUDY FOR SUPERFICIAL THROMBOPHLEBITIS IN THE RIGHT AND LEFT  UPPER EXTREMITIES. LIMITED EVALUATION OF THE CALF DUE TO TO PATIENT BODY HABITUS. NEGATIVE STUDY FOR ACUTE PROXIMAL DVT IN THE RIGHT AND LEFT LOWER EXTREMITIES. NEGATIVE STUDY FOR ACUTE CALF DVT IN THE RIGHT AND LEFT LOWER EXTREMITIES. NEGATIVE STUDY FOR SUPERFICIAL THROMBOPHLEBITIS IN THE RIGHT AND LEFT LOWER EXTREMITIES. US DUP LOWER EXTREMITIES BILATERAL VENOUS    Result Date: 8/10/2021  EXAMINATION: BILATERAL UPPER EXTREMITY DEEP VENOUS ULTRASOUND WITH DOPPLER IMAGING CLINICAL HISTORY:  Upper extremity swelling COMPARISON: None TECHNIQUE:  Pj Serene scale ultrasound with compression maneuvers where accessible, color and spectral Doppler of the internal jugular, subclavian, axillary and brachial veins was performed bilaterally. Grey scale with and without compression of the basilic and cephalic  veins was also performed bilaterally. Images were obtained and stored in a permanent archive. RESULT: Examination limited by intravenous lines.  RIGHT UPPER EXTREMITY DEEP VEINS: Internal Jugular vein: Normal compression, normal spontaneous flow, Subclavian vein:  Normal, spontaneous flow, Axillary vein:  Normal compression, normal spontaneous flow, Brachial vein:  Normal compression, normal spontaneous flow, RIGHT UPPER EXTREMITY SUPERFICIAL VEINS: Basilic vein: Normal compression Cephalic vein:  Normal compression LEFT UPPER EXTREMITY DEEP VEINS: Internal Jugular vein: Normal compression, normal spontaneous flow, Subclavian vein:  Normal, spontaneous flow, Axillary vein:  Normal compression, normal spontaneous flow, Brachial vein:  Normal compression, normal spontaneous flow, LEFT UPPER EXTREMITY SUPERFICIAL VEINS: Basilic vein: Normal compression Cephalic vein:  Normal compression EXAMINATION: BILATERAL LOWER EXTREMITY DEEP VENOUS ULTRASOUND WITH DOPPLER IMAGING CLINICAL HISTORY: SWELLING COMPARISON: None TECHNIQUE:  Gray scale with compression maneuvers, Color Doppler and Spectral Doppler at rest and with augmentation of the distal external iliac, common femoral, femoral and popliteal veins was performed. Grey scale with compression maneuvers of the peroneal and posterior tibial veins was performed. The great and small saphenous veins were imaged in grey scale with compression maneuvers at their insertion to the deep system. Imaging was performed in both lower extremities. Images were obtained and stored in a permanent archive. RESULT: RIGHT LOWER EXTREMITY PROXIMAL DEEP VEINS Distal External Iliac and Common Femoral Veins:      Compression: Normal      Doppler: Normal, spontaneous respirophasic flow                     Normal response to augmentation  Femoral vein:      Compression: Normal      Doppler: Normal, spontaneous flow                     Normal response to augmentation Popliteal vein:      Compression: Normal      Doppler: Normal, spontaneous flow                     Normal response to augmentation CALF DEEP VEINS Peroneal veins: Normal compression Posterior tibial veins: Normal compression Gastrocnemius and Soleal veins: Not imaged SUPERFICIAL VEINS Great saphenous: Patent and compressible at insertion into common femoral vein; not otherwise assessed. Small Saphenous:Patent and compressible in the proximal calf, not otherwise assessed.  LEFT LOWER EXTREMITY PROXIMAL DEEP VEINS Distal External Iliac and Common Femoral Veins:      Compression: Normal      Doppler: Normal, spontaneous respirophasic flow                      Normal response to augmentation Femoral vein:      Compression: Normal      Doppler: Normal, spontaneous flow                      Normal response to augmentation  Popliteal vein:      Compression: Normal      Doppler: Normal, spontaneous flow                      Normal response to augmentation CALF DEEP VEINS Peroneal veins: Normal compression Posterior tibial veins: Normal compression Gastrocnemius and Soleal veins: Not imaged SUPERFICIAL VEINS Great saphenous: Patent at insertion into common femoral vein; not otherwise assessed. Small Saphenous: Patent and compressible in the proximal calf, not otherwise assessed. NEGATIVE STUDY FOR ACUTE DVT IN THE RIGHT AND LEFT UPPER EXTREMITIES. NEGATIVE STUDY FOR SUPERFICIAL THROMBOPHLEBITIS IN THE RIGHT AND LEFT  UPPER EXTREMITIES. LIMITED EVALUATION OF THE CALF DUE TO TO PATIENT BODY HABITUS. NEGATIVE STUDY FOR ACUTE PROXIMAL DVT IN THE RIGHT AND LEFT LOWER EXTREMITIES. NEGATIVE STUDY FOR ACUTE CALF DVT IN THE RIGHT AND LEFT LOWER EXTREMITIES. NEGATIVE STUDY FOR SUPERFICIAL THROMBOPHLEBITIS IN THE RIGHT AND LEFT LOWER EXTREMITIES. Active Hospital Problems    Diagnosis Date Noted    Acute respiratory failure with hypoxia (Formerly [facility]) [J96.01]      Priority: Low    Pneumonia due to COVID-19 virus [U07.1, J12.82] 08/10/2021     Priority: Low    [facility] [E11.9]      Priority: Low    Hypoxia [R09.02]      Priority: Low         Impression/Plan:   1. Respiratory failure- vented. Attempt to wean. 2. COVID PNA- severe  3. Tachybrady- keep on Telemetry. K and Mg WNL  4. HTN - labile- will observe. 5. obtain Echo. Thank you for allowing us to participate in the care of this patient. Will continue to follow. Please call if questions or concerns arise.     Electronically signed by Karen Yu MD on 8/28/2021 at 11:15 AM

## 2021-08-28 NOTE — PROGRESS NOTES
Pulmonary ICU Progress Note    PRIMARY SERVICE: Pulmonary Disease    INTERVAL HPI: Patient seen and examined at bedside, Interval Notes, orders reviewed. Nursing notes noted    Patient is on vent Support with assist control with rate of 30 tidal volume 480 FiO2 40% PEEP of 8. Patient was restless this morning even with sedation with diprivan and and fentanyl max out added Precedex. ABG shows pH 7.43 PCO2 51 PO2 89 saturation 97 bicarb 34. He has no fever. No vomiting diarrhea. He has good urine output. He is off Levophed. Review of Systems   as above. Intake/Output Summary (Last 24 hours) at 8/28/2021 1206  Last data filed at 8/28/2021 0800  Gross per 24 hour   Intake 2293 ml   Output 2550 ml   Net -257 ml       Vitals:  BP (!) 142/64   Pulse 79   Temp 96.6 °F (35.9 °C) (Bladder)   Resp 30   Ht 6' (1.829 m)   Wt 242 lb 8.1 oz (110 kg)   SpO2 97%   BMI 32.89 kg/m²   EXAM:  General: Orally intubated, sedated. Comfortable in bed, No distress. Head: Atraumatic ,Normocephalic   Eyes: PERRL. No sclera icterus. No conjunctival injection. No discharge   ENT: No nasal  discharge. Pharynx clear. Neck:  Trachea midline. No thyromegaly, no JVD, No cervical adenopathy. Resp : Normal effort,  No accessory muscle use. No Rales. No wheezing. No rhonchi. CV: Normal  rate. Regular rhythm. No mumur ,  Rub or gallop  ABD: Non-tender. Non-distended. No masses. No organmegaly. Normal bowel sounds. No hernia.   EXT: No Pitting, No Cyanosis No clubbing  CNS: Sedated      ABG:     Lab Results   Component Value Date    PHART 7.434 08/28/2021    OTA1QEW 51 08/28/2021    PO2ART 89 08/28/2021    IJE9VKX 34.1 08/28/2021    BEART 10 08/28/2021    R0OSSOFH 97 08/28/2021     Lab Results   Component Value Date    LACTA 2.3 08/10/2021     O2 Device: Ventilator  O2 Flow Rate (L/min): 60 L/min    MV Settings:     Vent Mode: AC/VC  Vt Ordered: 480 mL  Rate Set: 30 bmp  FiO2 : (S) 40 %  PEEP/CPAP: 8  Pressure Support: 5 cmH20  Peak Inspiratory Pressure: 59 cmH2O  Plateau Pressure: 17 cmH20  Mean Airway Pressure: 27 cmH20  I:E Ratio: 1:1.20    ADULT TUBE FEEDING; Orogastric; Peptide Based High Protein; Continuous; 20; No; 300; Other (specify); Every 2 hours; Protein; give 3 protein shots :  2 with AM water flush, 1 with evening flush    IV:    dexmedetomidine      norepinephrine Stopped (08/28/21 0839)    fentaNYL (SUBLIMAZE) infusion 200 mcg/hr (08/28/21 0728)    propofol 50 mcg/kg/min (08/28/21 0851)    sodium chloride      dextrose         Medications:  Scheduled Meds:   insulin glargine  35 Units Subcutaneous BID    insulin lispro  4 Units Subcutaneous 4x Daily    docusate  100 mg Per NG tube BID    insulin lispro  0-12 Units Subcutaneous Q4H    piperacillin-tazobactam  3,375 mg IntraVENous Q8H    chlorhexidine  15 mL Mouth/Throat BID    pantoprazole  40 mg IntraVENous Daily    And    sodium chloride (PF)  10 mL IntraVENous Daily    enoxaparin  1 mg/kg Subcutaneous BID    polyethylene glycol  17 g Oral Daily    sodium chloride flush  5-40 mL IntraVENous 2 times per day    traZODone  100 mg Oral Nightly       PRN Meds:  acetaminophen **OR** acetaminophen, guaiFENesin-codeine, sodium chloride flush, sodium chloride, ondansetron **OR** ondansetron, polyethylene glycol, potassium chloride, magnesium sulfate, ipratropium-albuterol, hydrALAZINE, glucose, dextrose, glucagon (rDNA), dextrose        Radiology      CTA CHEST W WO CONTRAST    Result Date: 8/22/2021  The EXAMINATION: CT scan of the chest with contrast (pulmonary embolism protocol) INDICATION: Respiratory failure COMPARISON: None TECHNIQUE: Helical CT was performed through the chest utilizing 100 cc of Isovue-300 intravenous contrast.  Images were obtained with bolus tracking in order to opacify the pulmonary arteries. Both MIP and 3D volume rendered reconstructions were performed.  FINDINGS: Findings are positive for pulmonary emboli within the proximal segmental right lower lobe pulmonary artery as well as the junction between the main left pulmonary artery and the proximal left lower lobe and proximal segmental pulmonary arteries. There is diffuse to confluent airspace disease throughout the lung parenchyma. No pleural effusion. No pneumothoraces. Within the field-of-view of the abdomen visualized abdominal contents are unremarkable. There is multilevel degenerative changes of the thoracic spine     1. FINDINGS ARE POSITIVE FOR PULMONARY EMBOLI AS DESCRIBED ABOVE. 2. THERE IS DIFFUSE TO CONFLUENT AIRSPACE DISEASE THROUGHOUT THE LUNG PARENCHYMA. COMMONLY REPORT IMAGING FEATURES OF (COVID-19) PNEUMONIA ARE PRESENT. OTHER PROCESSES SUCH AS INFLUENZA, PNEUMONIA AND ORGANIZING PNEUMONIA AS CAN BE SEEN WITH DRUG TOXICITY AND CONNECTIVE TISSUE DISEASE CAN CAUSE A SIMILAR IMAGING PATTERN. PATRICIO THE NURSE CARING FOR THE PATIENT WAS NOTIFIED IMMEDIATELY ABOVE FINDINGS UPON AUGUST 26, 2021 AT 2125 HOURS All CT scans at this facility use dose modulation, iterative reconstruction, and/or weight based dosing when appropriate to reduce radiation dose to as low as reasonably achievable. XR CHEST PORTABLE    Result Date: 8/26/2021  EXAMINATION: XR CHEST PORTABLE CLINICAL HISTORY: RESPIRATORY FAILURE COMPARISONS: AUGUST 24, 2021 FINDINGS: Tip of endotracheal tube 3.4 cm superior to ej. Nasogastric tube courses beneath diaphragm and into stomach. Right jugular vein central line tip in superior vena cava. Osseous structures intact. Cardiopericardial silhouette normal. Ill-defined areas of increased opacity found predominantly in bilateral lower lung zones, and to lesser extent, mid and upper lung zones. INTERSTITIAL AND ALVEOLAR EDEMA VERSUS ATELECTASIS/PNEUMONIA AS DISCUSSED.     XR CHEST PORTABLE    Result Date: 8/24/2021  EXAMINATION: XR CHEST PORTABLE CLINICAL HISTORY: RESPIRATORY FAILURE COMPARISONS: CT CHEST, AUGUST 22, 2021, CHEST RADIOGRAPH, SAME DATE FINDINGS: with tip in the superior vena cava. No pneumothorax. ET tube in position with its tip at the level of the T1 vertebral body. Persistent bilateral airspace opacities. IMPRESSION:  Tubes and life lines in position. Persistent bilateral airspace infiltrates     XR CHEST PORTABLE    Result Date: 8/20/2021  Exam: XR CHEST PORTABLE History:  resp failure Technique: AP portable view of the chest obtained. Comparison: Chest x-ray from August 18, 2021 Chest x-ray portable Findings: The cardiomediastinal silhouette is within normal limits. There are bilateral patchy alveolar opacities predominantly in the lung bases obscuring the heart borders. . Bones of the thorax appear intact. There are patchy alveolar opacities in both lungs worrisome for viral infiltrates, pneumonia     XR CHEST PORTABLE    Result Date: 8/18/2021  EXAMINATION: CHEST PORTABLE VIEW  CLINICAL HISTORY: Increasing short of breath. Correlate. Follow-up COMPARISONS: August 10, 2021  FINDINGS: Single  views of the chest is submitted. The cardiac silhouette is enlarged Pulmonary vascular unremarkable. Right sided trachea. Persistent multifocal to patchy glass infiltrates throughout the lung parenchyma. No Pneumothoraces. PERSISTENT PATCHY BILATERAL MULTIFOCAL TO GLASS INFILTRATES THROUGHOUT THE LUNG PARENCHYMA    XR CHEST PORTABLE    Result Date: 8/10/2021  EXAMINATION: XR CHEST PORTABLE CLINICAL HISTORY: SHORTNESS OF BREATH COMPARISONS: None available. FINDINGS: Patient leaning to left. Osseous structures intact. Cardiopericardial silhouette enlarged. Ill-defined areas of increased opacity are found in the right upper and right lower lung, with air bronchogram in the right lower lung. Air bronchogram increased opacity is also found in the left lower lung, and to lesser degree the left midlung zone. BILATERAL ATELECTASIS/PNEUMONIA.  CARDIOMEGALY    US DUP UPPER EXTREMITY RIGHT VENOUS    Result Date: 8/10/2021  EXAMINATION: BILATERAL UPPER EXTREMITY DEEP VENOUS ULTRASOUND WITH DOPPLER IMAGING CLINICAL HISTORY:  Upper extremity swelling COMPARISON: None TECHNIQUE:  Jearlean Mullet scale ultrasound with compression maneuvers where accessible, color and spectral Doppler of the internal jugular, subclavian, axillary and brachial veins was performed bilaterally. Grey scale with and without compression of the basilic and cephalic  veins was also performed bilaterally. Images were obtained and stored in a permanent archive. RESULT: Examination limited by intravenous lines. RIGHT UPPER EXTREMITY DEEP VEINS: Internal Jugular vein: Normal compression, normal spontaneous flow, Subclavian vein:  Normal, spontaneous flow, Axillary vein:  Normal compression, normal spontaneous flow, Brachial vein:  Normal compression, normal spontaneous flow, RIGHT UPPER EXTREMITY SUPERFICIAL VEINS: Basilic vein: Normal compression Cephalic vein:  Normal compression LEFT UPPER EXTREMITY DEEP VEINS: Internal Jugular vein: Normal compression, normal spontaneous flow, Subclavian vein:  Normal, spontaneous flow, Axillary vein:  Normal compression, normal spontaneous flow, Brachial vein:  Normal compression, normal spontaneous flow, LEFT UPPER EXTREMITY SUPERFICIAL VEINS: Basilic vein: Normal compression Cephalic vein:  Normal compression EXAMINATION: BILATERAL LOWER EXTREMITY DEEP VENOUS ULTRASOUND WITH DOPPLER IMAGING CLINICAL HISTORY: SWELLING COMPARISON: None TECHNIQUE:  Gray scale with compression maneuvers, Color Doppler and Spectral Doppler at rest and with augmentation of the distal external iliac, common femoral, femoral and popliteal veins was performed. Grey scale with compression maneuvers of the peroneal and posterior tibial veins was performed. The great and small saphenous veins were imaged in grey scale with compression maneuvers at their insertion to the deep system.   Imaging was performed in both lower extremities. Images were obtained and stored in a permanent archive. RESULT: RIGHT LOWER EXTREMITY PROXIMAL DEEP VEINS Distal External Iliac and Common Femoral Veins:      Compression: Normal      Doppler: Normal, spontaneous respirophasic flow                     Normal response to augmentation  Femoral vein:      Compression: Normal      Doppler: Normal, spontaneous flow                     Normal response to augmentation Popliteal vein:      Compression: Normal      Doppler: Normal, spontaneous flow                     Normal response to augmentation CALF DEEP VEINS Peroneal veins: Normal compression Posterior tibial veins: Normal compression Gastrocnemius and Soleal veins: Not imaged SUPERFICIAL VEINS Great saphenous: Patent and compressible at insertion into common femoral vein; not otherwise assessed. Small Saphenous:Patent and compressible in the proximal calf, not otherwise assessed. LEFT LOWER EXTREMITY PROXIMAL DEEP VEINS Distal External Iliac and Common Femoral Veins:      Compression: Normal      Doppler: Normal, spontaneous respirophasic flow                      Normal response to augmentation Femoral vein:      Compression: Normal      Doppler: Normal, spontaneous flow                      Normal response to augmentation  Popliteal vein:      Compression: Normal      Doppler: Normal, spontaneous flow                      Normal response to augmentation CALF DEEP VEINS Peroneal veins: Normal compression Posterior tibial veins: Normal compression Gastrocnemius and Soleal veins: Not imaged SUPERFICIAL VEINS Great saphenous: Patent at insertion into common femoral vein; not otherwise assessed. Small Saphenous: Patent and compressible in the proximal calf, not otherwise assessed. NEGATIVE STUDY FOR ACUTE DVT IN THE RIGHT AND LEFT UPPER EXTREMITIES. NEGATIVE STUDY FOR SUPERFICIAL THROMBOPHLEBITIS IN THE RIGHT AND LEFT  UPPER EXTREMITIES. LIMITED EVALUATION OF THE CALF DUE TO TO PATIENT BODY HABITUS. NEGATIVE STUDY FOR ACUTE PROXIMAL DVT IN THE RIGHT AND LEFT LOWER EXTREMITIES. NEGATIVE STUDY FOR ACUTE CALF DVT IN THE RIGHT AND LEFT LOWER EXTREMITIES. NEGATIVE STUDY FOR SUPERFICIAL THROMBOPHLEBITIS IN THE RIGHT AND LEFT LOWER EXTREMITIES. US DUP UPPER EXTREMITY LEFT VENOUS    Result Date: 8/10/2021  EXAMINATION: BILATERAL UPPER EXTREMITY DEEP VENOUS ULTRASOUND WITH DOPPLER IMAGING CLINICAL HISTORY:  Upper extremity swelling COMPARISON: None TECHNIQUE:  Lavella Reining scale ultrasound with compression maneuvers where accessible, color and spectral Doppler of the internal jugular, subclavian, axillary and brachial veins was performed bilaterally. Grey scale with and without compression of the basilic and cephalic  veins was also performed bilaterally. Images were obtained and stored in a permanent archive. RESULT: Examination limited by intravenous lines. RIGHT UPPER EXTREMITY DEEP VEINS: Internal Jugular vein: Normal compression, normal spontaneous flow, Subclavian vein:  Normal, spontaneous flow, Axillary vein:  Normal compression, normal spontaneous flow, Brachial vein:  Normal compression, normal spontaneous flow, RIGHT UPPER EXTREMITY SUPERFICIAL VEINS: Basilic vein: Normal compression Cephalic vein:  Normal compression LEFT UPPER EXTREMITY DEEP VEINS: Internal Jugular vein: Normal compression, normal spontaneous flow, Subclavian vein:  Normal, spontaneous flow, Axillary vein:  Normal compression, normal spontaneous flow, Brachial vein:  Normal compression, normal spontaneous flow, LEFT UPPER EXTREMITY SUPERFICIAL VEINS: Basilic vein: Normal compression Cephalic vein:  Normal compression EXAMINATION: BILATERAL LOWER EXTREMITY DEEP VENOUS ULTRASOUND WITH DOPPLER IMAGING CLINICAL HISTORY: SWELLING COMPARISON: None TECHNIQUE:  Gray scale with compression maneuvers, Color Doppler and Spectral Doppler at rest and with augmentation of the distal external iliac, common femoral, femoral and popliteal veins was performed. Grey scale with compression maneuvers of the peroneal and posterior tibial veins was performed. The great and small saphenous veins were imaged in grey scale with compression maneuvers at their insertion to the deep system. Imaging was performed in both lower extremities. Images were obtained and stored in a permanent archive. RESULT: RIGHT LOWER EXTREMITY PROXIMAL DEEP VEINS Distal External Iliac and Common Femoral Veins:      Compression: Normal      Doppler: Normal, spontaneous respirophasic flow                     Normal response to augmentation  Femoral vein:      Compression: Normal      Doppler: Normal, spontaneous flow                     Normal response to augmentation Popliteal vein:      Compression: Normal      Doppler: Normal, spontaneous flow                     Normal response to augmentation CALF DEEP VEINS Peroneal veins: Normal compression Posterior tibial veins: Normal compression Gastrocnemius and Soleal veins: Not imaged SUPERFICIAL VEINS Great saphenous: Patent and compressible at insertion into common femoral vein; not otherwise assessed. Small Saphenous:Patent and compressible in the proximal calf, not otherwise assessed. LEFT LOWER EXTREMITY PROXIMAL DEEP VEINS Distal External Iliac and Common Femoral Veins:      Compression: Normal      Doppler: Normal, spontaneous respirophasic flow                      Normal response to augmentation Femoral vein:      Compression: Normal      Doppler: Normal, spontaneous flow                      Normal response to augmentation  Popliteal vein:      Compression: Normal      Doppler: Normal, spontaneous flow                      Normal response to augmentation CALF DEEP VEINS Peroneal veins: Normal compression Posterior tibial veins: Normal compression Gastrocnemius and Soleal veins: Not imaged SUPERFICIAL VEINS Great saphenous: Patent at insertion into common femoral vein; not otherwise assessed.  Small Saphenous: Patent and compressible in the proximal calf, not otherwise assessed. NEGATIVE STUDY FOR ACUTE DVT IN THE RIGHT AND LEFT UPPER EXTREMITIES. NEGATIVE STUDY FOR SUPERFICIAL THROMBOPHLEBITIS IN THE RIGHT AND LEFT  UPPER EXTREMITIES. LIMITED EVALUATION OF THE CALF DUE TO TO PATIENT BODY HABITUS. NEGATIVE STUDY FOR ACUTE PROXIMAL DVT IN THE RIGHT AND LEFT LOWER EXTREMITIES. NEGATIVE STUDY FOR ACUTE CALF DVT IN THE RIGHT AND LEFT LOWER EXTREMITIES. NEGATIVE STUDY FOR SUPERFICIAL THROMBOPHLEBITIS IN THE RIGHT AND LEFT LOWER EXTREMITIES. US DUP LOWER EXTREMITIES BILATERAL VENOUS    Result Date: 8/10/2021  EXAMINATION: BILATERAL UPPER EXTREMITY DEEP VENOUS ULTRASOUND WITH DOPPLER IMAGING CLINICAL HISTORY:  Upper extremity swelling COMPARISON: None TECHNIQUE:  Jerral Shane scale ultrasound with compression maneuvers where accessible, color and spectral Doppler of the internal jugular, subclavian, axillary and brachial veins was performed bilaterally. Grey scale with and without compression of the basilic and cephalic  veins was also performed bilaterally. Images were obtained and stored in a permanent archive. RESULT: Examination limited by intravenous lines.  RIGHT UPPER EXTREMITY DEEP VEINS: Internal Jugular vein: Normal compression, normal spontaneous flow, Subclavian vein:  Normal, spontaneous flow, Axillary vein:  Normal compression, normal spontaneous flow, Brachial vein:  Normal compression, normal spontaneous flow, RIGHT UPPER EXTREMITY SUPERFICIAL VEINS: Basilic vein: Normal compression Cephalic vein:  Normal compression LEFT UPPER EXTREMITY DEEP VEINS: Internal Jugular vein: Normal compression, normal spontaneous flow, Subclavian vein:  Normal, spontaneous flow, Axillary vein:  Normal compression, normal spontaneous flow, Brachial vein:  Normal compression, normal spontaneous flow, LEFT UPPER EXTREMITY SUPERFICIAL VEINS: Basilic vein: Normal compression Cephalic vein:  Normal compression EXAMINATION: BILATERAL LOWER EXTREMITY DEEP VENOUS ULTRASOUND WITH DOPPLER IMAGING CLINICAL HISTORY: SWELLING COMPARISON: None TECHNIQUE:  Belén Skiff scale with compression maneuvers, Color Doppler and Spectral Doppler at rest and with augmentation of the distal external iliac, common femoral, femoral and popliteal veins was performed. Grey scale with compression maneuvers of the peroneal and posterior tibial veins was performed. The great and small saphenous veins were imaged in grey scale with compression maneuvers at their insertion to the deep system. Imaging was performed in both lower extremities. Images were obtained and stored in a permanent archive. RESULT: RIGHT LOWER EXTREMITY PROXIMAL DEEP VEINS Distal External Iliac and Common Femoral Veins:      Compression: Normal      Doppler: Normal, spontaneous respirophasic flow                     Normal response to augmentation  Femoral vein:      Compression: Normal      Doppler: Normal, spontaneous flow                     Normal response to augmentation Popliteal vein:      Compression: Normal      Doppler: Normal, spontaneous flow                     Normal response to augmentation CALF DEEP VEINS Peroneal veins: Normal compression Posterior tibial veins: Normal compression Gastrocnemius and Soleal veins: Not imaged SUPERFICIAL VEINS Great saphenous: Patent and compressible at insertion into common femoral vein; not otherwise assessed. Small Saphenous:Patent and compressible in the proximal calf, not otherwise assessed.  LEFT LOWER EXTREMITY PROXIMAL DEEP VEINS Distal External Iliac and Common Femoral Veins:      Compression: Normal      Doppler: Normal, spontaneous respirophasic flow                      Normal response to augmentation Femoral vein:      Compression: Normal      Doppler: Normal, spontaneous flow                      Normal response to augmentation  Popliteal vein:      Compression: Normal      Doppler: Normal, spontaneous flow                      Normal response to augmentation CALF DEEP VEINS Peroneal veins: below noted problems    1. Severe acute hypoxic respiratory failure  2. Severe COVID-19 infection   3. Bacterial coinfection, gram-negative rods    4. Sepsis, and septic shock  5. Hyperglycemia  6. Hypercoagulable state    Suggestion:  Continue vent support with lung protective strategy. Head of the bed 30 degree. He is not tolerating weaning trial.  Required to add sedation with Precedex along with propofol and fentanyl. He has Lugo  catheter , Patient is on DVT and GI prophylaxis. Restraint as needed to prevent  Self-harm. .  Prone position as tolerated. Continue IV Zosyn. He is off Levophed. Completed remdesivir treatment. .Critical care time spent reviewing labs/films, examining patient, collaborating with otherphysicians but excluding procedures for life threatening organ failure is 34 minutes.       SIGNATURE: Bry Tobar MD, [facility]

## 2021-08-28 NOTE — PROGRESS NOTES
Department of Internal Medicine  General Internal Medicine  Attending Progress Note      SUBJECTIVE:  Pt seen through ICU door to reduce exposure to COVID-19. Pt intubated and in the supine position.     OBJECTIVE      Medications    Current Facility-Administered Medications: dexmedetomidine (PRECEDEX) 400 mcg in sodium chloride 0.9 % 100 mL infusion, 0.2-1.4 mcg/kg/hr, IntraVENous, Continuous  acetaminophen (TYLENOL) tablet 650 mg, 650 mg, Oral, Q4H PRN **OR** acetaminophen (TYLENOL) suppository 650 mg, 650 mg, Rectal, Q4H PRN  norepinephrine (LEVOPHED) 16 mg in dextrose 5% 250 mL infusion, 2-100 mcg/min, IntraVENous, Continuous  insulin glargine (LANTUS) injection vial 35 Units, 35 Units, Subcutaneous, BID  insulin lispro (HUMALOG) injection vial 4 Units, 4 Units, Subcutaneous, 4x Daily  docusate (COLACE) 50 MG/5ML liquid 100 mg, 100 mg, Per NG tube, BID  insulin lispro (HUMALOG) injection vial 0-12 Units, 0-12 Units, Subcutaneous, Q4H  fentaNYL (SUBLIMAZE) 1,000 mcg in sodium chloride 0.9 % 100 mL infusion, 12.5-200 mcg/hr, IntraVENous, Continuous  piperacillin-tazobactam (ZOSYN) 3,375 mg in dextrose 5 % 50 mL IVPB extended infusion (mini-bag), 3,375 mg, IntraVENous, Q8H  chlorhexidine (PERIDEX) 0.12 % solution 15 mL, 15 mL, Mouth/Throat, BID  pantoprazole (PROTONIX) injection 40 mg, 40 mg, IntraVENous, Daily **AND** sodium chloride (PF) 0.9 % injection 10 mL, 10 mL, IntraVENous, Daily  propofol injection, 5-50 mcg/kg/min, IntraVENous, Titrated  enoxaparin (LOVENOX) injection 105 mg, 1 mg/kg, Subcutaneous, BID  guaiFENesin-codeine (GUAIFENESIN AC) 100-10 MG/5ML liquid 5 mL, 5 mL, Oral, Q4H PRN  polyethylene glycol (GLYCOLAX) packet 17 g, 17 g, Oral, Daily  sodium chloride flush 0.9 % injection 5-40 mL, 5-40 mL, IntraVENous, 2 times per day  sodium chloride flush 0.9 % injection 5-40 mL, 5-40 mL, IntraVENous, PRN  0.9 % sodium chloride infusion, 25 mL, IntraVENous, PRN  ondansetron (ZOFRAN-ODT) disintegrating tablet 4 mg, 4 mg, Oral, Q8H PRN **OR** ondansetron (ZOFRAN) injection 4 mg, 4 mg, IntraVENous, Q6H PRN  polyethylene glycol (GLYCOLAX) packet 17 g, 17 g, Oral, Daily PRN  potassium chloride 10 mEq/100 mL IVPB (Peripheral Line), 10 mEq, IntraVENous, PRN  magnesium sulfate 2000 mg in 50 mL IVPB premix, 2,000 mg, IntraVENous, PRN  ipratropium-albuterol (DUONEB) nebulizer solution 1 ampule, 1 ampule, Inhalation, Q4H PRN  hydrALAZINE (APRESOLINE) injection 10 mg, 10 mg, IntraVENous, Q6H PRN  traZODone (DESYREL) tablet 100 mg, 100 mg, Oral, Nightly  glucose (GLUTOSE) 40 % oral gel 15 g, 15 g, Oral, PRN  dextrose 50 % IV solution, 12.5 g, IntraVENous, PRN  glucagon (rDNA) injection 1 mg, 1 mg, IntraMUSCular, PRN  dextrose 5 % solution, 100 mL/hr, IntraVENous, PRN  Physical    VITALS:  BP (!) 142/64   Pulse 77   Temp 99.9 °F (37.7 °C)   Resp 30   Ht 6' (1.829 m)   Wt 242 lb 8.1 oz (110 kg)   SpO2 95%   BMI 32.89 kg/m²   Constitutional: intubated and sedated  Head: Normocephalic, atraumatic  Lungs: no tachypnea, on the ventilator  Heart: tachycardic on tele  GI: obese     Data    CBC:   Lab Results   Component Value Date    WBC 13.3 08/28/2021    RBC 4.44 08/28/2021    HGB 12.3 08/28/2021    HCT 39.6 08/28/2021    MCV 89.0 08/28/2021    MCH 28.8 08/28/2021    MCHC 32.4 08/28/2021    RDW 13.5 08/28/2021     08/28/2021     CMP:    Lab Results   Component Value Date     08/28/2021    K 3.5 08/28/2021    K 3.5 08/28/2021     08/28/2021    CO2 31 08/28/2021    BUN 20 08/28/2021    CREATININE 0.7 08/28/2021    CREATININE 0.55 08/28/2021    GFRAA >60 08/28/2021    LABGLOM >60 08/28/2021    GLUCOSE 100 08/28/2021    PROT 5.9 08/28/2021    LABALBU 2.5 08/28/2021    CALCIUM 8.9 08/28/2021    BILITOT 0.4 08/28/2021    ALKPHOS 110 08/28/2021    AST 36 08/28/2021    ALT 34 08/28/2021       ASSESSMENT AND PLAN      # Acute hypoxic respiratory failure 2/2 COVID-19 pneumonia  - was intubated on 8/22  - sedated, paralyzed. Prone as tolerated  - critical care consulted  - completed course of remdesivir, actemra  - continuing decadron, lovenox  - ID consulted  - Resp Cx with acinetobacter and klebsiella  - empiric zosyn    # New onset diabetes  - cont lantus, ISS  - endocrinology consulted    DVT: lovenox per covid protocol    Disposition: Pt continues to be on vent. Critical care managing. Continuing decadron, zosyn. ID consulted.        Megha Gudino DO  Internal Medicine

## 2021-08-28 NOTE — PROGRESS NOTES
Progress Note  Date:2021       Room:[place] [address]  Patient Sade Hazel     YOB: 1965     Age:56 y.o. Chief complaint uncontrolled diabetes new onset type 2 diabetes    Subjective    Subjective:  Symptoms:  Worsening. Diet:  NPO (NG tube feeding). Activity level: Impaired due to weakness. Review of Systems   Unable to perform ROS: Mental status change     Objective         Vitals Last 24 Hours:  TEMPERATURE:  Temp  Av.7 °F (38.7 °C)  Min: 98.2 °F (36.8 °C)  Max: 103.1 °F (39.5 °C)  RESPIRATIONS RANGE: Resp  Av.6  Min: 15  Max: 31  PULSE OXIMETRY RANGE: SpO2  Av.8 %  Min: 93 %  Max: 99 %  PULSE RANGE: Pulse  Av.8  Min: 42  Max: 94  BLOOD PRESSURE RANGE: No data recorded.  ; No data recorded. I/O (24Hr): Intake/Output Summary (Last 24 hours) at 2021 2252  Last data filed at 2021 1759  Gross per 24 hour   Intake 2542 ml   Output 1150 ml   Net 1392 ml     Objective:  General Appearance:  Ill-appearing. Vital signs: (most recent): Blood pressure (!) 142/64, pulse 77, temperature 98.2 °F (36.8 °C), resp. rate 30, height 6' (1.829 m), weight 242 lb 8.1 oz (110 kg), SpO2 99 %. Labs/Imaging/Diagnostics    Labs:  CBC:  Recent Labs     21  0510 21  0609 21  0539 21  1121 21  0526 21  1103 21  2106   WBC 14.3*  --  9.7  --  7.9  --   --    RBC 5.06  --  4.60*  --  4.51*  --   --    HGB 14.9   < > 13.4*   < > 13.0* 14.1 15.0   HCT 45.3  --  40.7*  --  40.4*  --   --    MCV 89.4  --  88.5  --  89.6  --   --    RDW 13.4  --  13.4  --  13.6  --   --      --  222  --  249  --   --     < > = values in this interval not displayed.      CHEMISTRIES:  Recent Labs     21  0502 21  0609 21  0540 21  1121 21  0555 21  1103 21  2106     --  147*  --  152*  --   --    K 4.1  4.1   < > 3.6  3.6  --  4.3  4.3  --   --      --  108*  --  112*  --   --    CO2 25  --  29  --  32*  --   --    BUN 40*  --  29*  --  23*  --   --    CREATININE 0.98   < > 0.63*   < > 0.85 1.2 0.8*   GLUCOSE 366*  --  259*  --  160*  --   --    PHOS 3.4  --  2.7  --  3.3  --   --    MG 2.4  --   --   --   --   --   --     < > = values in this interval not displayed. PT/INR:No results for input(s): PROTIME, INR in the last 72 hours. APTT:No results for input(s): APTT in the last 72 hours. LIVER PROFILE:  Recent Labs     08/25/21  0502 08/26/21  0540 08/27/21  0555   AST 17 11 14   ALT 20 16 15   BILITOT 0.4 0.3 <0.2   ALKPHOS 133* 114* 109*       Imaging Last 24 Hours:  XR CHEST PORTABLE    Result Date: 8/26/2021  EXAMINATION: XR CHEST PORTABLE CLINICAL HISTORY: RESPIRATORY FAILURE COMPARISONS: AUGUST 24, 2021 FINDINGS: Tip of endotracheal tube 3.4 cm superior to ej. Nasogastric tube courses beneath diaphragm and into stomach. Right jugular vein central line tip in superior vena cava. Osseous structures intact. Cardiopericardial silhouette normal. Ill-defined areas of increased opacity found predominantly in bilateral lower lung zones, and to lesser extent, mid and upper lung zones. INTERSTITIAL AND ALVEOLAR EDEMA VERSUS ATELECTASIS/PNEUMONIA AS DISCUSSED. Assessment//Plan           Hospital Problems         Last Modified POA    Pneumonia due to COVID-19 virus 8/17/2021 Yes    New onset type 2 diabetes mellitus ([address] 8/10/2021 Yes    Hypoxia 8/10/2021 Yes    Acute respiratory failure with hypoxia ([address] 8/22/2021 Yes        Assessment:    Condition: In serious condition. Worsening. (Uncontrolled type 2 diabetes new onset type 2 diabetes  Pneumonia secondary to Covid  Respiratory failure on vent  Pulmonary embolism).      Plan:   (Continue Lantus 35 units at night plus Humalog 4 units 4 times a day plus coverage  Continue IV antibiotics as per infectious disease  Continue NG tube feeding  Patient seen through glass window  COVID-19 protocol virtual visit  Total time spent 15

## 2021-08-29 NOTE — PROGRESS NOTES
Infectious Disease     Patient Name: Guera Mc  Date: 8/29/2021  YOB: 1965  Medical Record Number: [id number]      COVID-19 pneumonia  Staphylococcus bacteremia        Remains intubated 40% FiO2 8 PEEP   97% sat  Tolerating some weaning  Fevers    Chest x-ray shows persistent bilateral alveolar infiltrates somewhat improved from 8/22/2021      Blood culture positive  Culture, Blood 2 [[id number]] (Abnormal) Collected: 08/27/21 1617   Order Status: Completed Specimen: Blood Updated: 08/29/21 0811    Culture, Blood 2 1 out of 2 blood culturesAbnormal     Organism Staphylococcus lugdunensis DNA DetectedAbnormal      Sensitivities pending        Review of Systems   Unable to perform ROS: Intubated   More awake interactive       Physical Exam  Constitutional:       Appearance: He is ill-appearing. Cardiovascular:      Heart sounds: Normal heart sounds. No murmur heard. Pulmonary:      Effort: Pulmonary effort is normal.      Breath sounds: No wheezing, rhonchi or rales. Abdominal:      General: Abdomen is flat. There is no distension. Palpations: There is no mass. Tenderness: There is no abdominal tenderness. There is no guarding. Musculoskeletal:      Right lower leg: No edema. Left lower leg: No edema. Blood pressure (!) 142/64, pulse 61, temperature 101.5 °F (38.6 °C), temperature source Bladder, resp. rate 21, height 6' (1.829 m), weight 242 lb 8.1 oz (110 kg), SpO2 95 %.       .   Lab Results   Component Value Date    WBC 10.0 08/29/2021    HGB 11.2 (L) 08/29/2021    HCT 33.9 (L) 08/29/2021    MCV 89.3 08/29/2021     08/29/2021     Lab Results   Component Value Date     08/29/2021    K 3.2 08/29/2021    K 3.2 08/29/2021     08/29/2021    CO2 29 08/29/2021    BUN 17 08/29/2021    CREATININE 1.0 08/29/2021    CREATININE 0.72 08/29/2021    GLUCOSE 96 08/29/2021    CALCIUM 8.5 08/29/2021      EXAMINATION: CHEST PORTABLE VIEW       CLINICAL HISTORY: Increasing short of breath. Correlate. Follow-up       COMPARISONS: August 10, 2021       FINDINGS:       Single  views of the chest is submitted.  The cardiac silhouette is enlarged   Pulmonary vascular unremarkable. Right sided trachea. Persistent multifocal to patchy glass infiltrates throughout the lung parenchyma.  No Pneumothoraces.                                                                                        Impression   PERSISTENT PATCHY BILATERAL MULTIFOCAL TO GLASS INFILTRATES THROUGHOUT THE LUNG PARENCHYMA     CULTURE, RESPIRATORY 08/24/2021  5:40 AM  - [facility]   Moderate growth   ID and sensitivity to follow    Organism Abnormal  08/24/2021  5:40 AM  - [facility]   Gram negative sanjana    CULTURE, RESPIRATORY 08/24/2021  5:40 AM MH - [facility]           Acinetobacter baumannii (1)    Antibiotic Interpretation YESSICA Status    ampicillin-sulbactam Sensitive <=2 mcg/mL     cefepime Sensitive 2 mcg/mL     cefTRIAXone Sensitive 8 mcg/mL     ciprofloxacin Sensitive <=0.25 mcg/mL     gentamicin Sensitive <=1 mcg/mL     imipenem Sensitive <=0.25 mcg/mL     tobramycin Sensitive <=1 mcg/mL     Klebsiella aerogenes (2)    Antibiotic Interpretation YESSICA Status    amoxicillin-clavulanate Resistant >=32 mcg/mL     ceFAZolin Resistant >=64 mcg/mL     cefTRIAXone Sensitive <=1 mcg/mL     ciprofloxacin Sensitive <=0.25 mcg/mL     gentamicin Sensitive <=1 mcg/mL     trimethoprim-sulfamethoxazole Sensitive <=20 mcg/mL               PLAN:    COVID-19 pneumonia  Staphylococcus lugdunensis bacteremia  Add vancomycin     PE  Empirically on Zosyn   Acinetobacter and Klebsiella from sputum

## 2021-08-29 NOTE — PROGRESS NOTES
Assessment completed (see flowsheets). Patient still on ventilator, on levophed for pressure support. Being sedated by propofol and fentanyl. Spoke with son and updated on patient's condition, answered all questions/concerns.

## 2021-08-29 NOTE — PROGRESS NOTES
Pulmonary ICU Progress Note    PRIMARY SERVICE: Pulmonary Disease    INTERVAL HPI: Patient seen and examined at bedside, Interval Notes, orders reviewed. Nursing notes noted    Patient is on vent Support with assist control with rate of 30 tidal volume 480 FiO2 50% PEEP of 8. Patient is on sedation with diprivan and fentanyl and may Precedex but still patient does get restless. He was having O2 desaturation this morning and chest x-ray done which shows no significant change from prior. Respiratory therapist did multiple lavage and suck out thick mucus via ET tube. He is having fever up to 101.5. He has no fever. No vomiting diarrhea. He is on low-dose Levophed. Son at bedside and gave update      Review of Systems   as above. Intake/Output Summary (Last 24 hours) at 8/29/2021 1448  Last data filed at 8/29/2021 0800  Gross per 24 hour   Intake 3101.41 ml   Output 1050 ml   Net 2051.41 ml       Vitals:  BP (!) 142/64   Pulse 61   Temp 101.5 °F (38.6 °C) (Bladder)   Resp 25   Ht 6' (1.829 m)   Wt 242 lb 8.1 oz (110 kg)   SpO2 94%   BMI 32.89 kg/m²   EXAM:  General: Orally intubated, sedated. Comfortable in bed, No distress. Head: Atraumatic ,Normocephalic   Eyes: PERRL. No sclera icterus. No conjunctival injection. No discharge   ENT: No nasal  discharge. Pharynx clear. Neck:  Trachea midline. No thyromegaly, no JVD, No cervical adenopathy. Resp : Normal effort,  No accessory muscle use. No Rales. Few scattered rhonchi. CV: Normal  rate. Regular rhythm. No mumur ,  Rub or gallop  ABD: Non-tender. Non-distended. No masses. No organmegaly. Normal bowel sounds. No hernia.   EXT: No Pitting, No Cyanosis No clubbing  CNS: Sedated      ABG:     Lab Results   Component Value Date    PHART 7.392 08/29/2021    ZPJ0NMO 50 08/29/2021    PO2ART 62 08/29/2021    QHX8THX 30.2 08/29/2021    BEART 5 08/29/2021    Q9OGDMZP 91 08/29/2021     Lab Results   Component Value Date    LACTA 2.3 08/10/2021     O2 Device: Ventilator  O2 Flow Rate (L/min): 60 L/min    MV Settings:     Vent Mode: AC/VC  Vt Ordered: 480 mL  Rate Set: 30 bmp  FiO2 : 40 %  PEEP/CPAP: 8  Pressure Support: 5 cmH20  Peak Inspiratory Pressure: 45 cmH2O  Plateau Pressure: 30 cmH20  Mean Airway Pressure: 18 cmH20  I:E Ratio: 1:1.90    ADULT TUBE FEEDING; Orogastric; Peptide Based High Protein; Continuous; 20; No; 300; Other (specify);  Every 2 hours; Protein; give 3 protein shots :  2 with AM water flush, 1 with evening flush    IV:    dexmedetomidine 1 mcg/kg/hr (08/29/21 1443)    norepinephrine 2 mcg/min (08/29/21 1910)    fentaNYL (SUBLIMAZE) infusion 200 mcg/hr (08/29/21 1043)    propofol 50 mcg/kg/min (08/29/21 1230)    sodium chloride      dextrose         Medications:  Scheduled Meds:   insulin glargine  35 Units Subcutaneous BID    insulin lispro  4 Units Subcutaneous 4x Daily    docusate  100 mg Per NG tube BID    insulin lispro  0-12 Units Subcutaneous Q4H    piperacillin-tazobactam  3,375 mg IntraVENous Q8H    chlorhexidine  15 mL Mouth/Throat BID    pantoprazole  40 mg IntraVENous Daily    And    sodium chloride (PF)  10 mL IntraVENous Daily    enoxaparin  1 mg/kg Subcutaneous BID    polyethylene glycol  17 g Oral Daily    sodium chloride flush  5-40 mL IntraVENous 2 times per day    traZODone  100 mg Oral Nightly       PRN Meds:  acetaminophen **OR** acetaminophen, guaiFENesin-codeine, sodium chloride flush, sodium chloride, ondansetron **OR** ondansetron, polyethylene glycol, potassium chloride, magnesium sulfate, ipratropium-albuterol, hydrALAZINE, glucose, dextrose, glucagon (rDNA), dextrose        Radiology      CTA CHEST W WO CONTRAST    Result Date: 8/22/2021  The EXAMINATION: CT scan of the chest with contrast (pulmonary embolism protocol) INDICATION: Respiratory failure COMPARISON: None TECHNIQUE: Helical CT was performed through the chest utilizing 100 cc of Isovue-300 intravenous contrast.  Images were obtained with bolus tracking in order to opacify the pulmonary arteries. Both MIP and 3D volume rendered reconstructions were performed. FINDINGS: Findings are positive for pulmonary emboli within the proximal segmental right lower lobe pulmonary artery as well as the junction between the main left pulmonary artery and the proximal left lower lobe and proximal segmental pulmonary arteries. There is diffuse to confluent airspace disease throughout the lung parenchyma. No pleural effusion. No pneumothoraces. Within the field-of-view of the abdomen visualized abdominal contents are unremarkable. There is multilevel degenerative changes of the thoracic spine     1. FINDINGS ARE POSITIVE FOR PULMONARY EMBOLI AS DESCRIBED ABOVE. 2. THERE IS DIFFUSE TO CONFLUENT AIRSPACE DISEASE THROUGHOUT THE LUNG PARENCHYMA. COMMONLY REPORT IMAGING FEATURES OF (COVID-19) PNEUMONIA ARE PRESENT. OTHER PROCESSES SUCH AS INFLUENZA, PNEUMONIA AND ORGANIZING PNEUMONIA AS CAN BE SEEN WITH DRUG TOXICITY AND CONNECTIVE TISSUE DISEASE CAN CAUSE A SIMILAR IMAGING PATTERN. PATRICIO THE NURSE CARING FOR THE PATIENT WAS NOTIFIED IMMEDIATELY ABOVE FINDINGS UPON AUGUST 26, 2021 AT 1554 HOURS All CT scans at this facility use dose modulation, iterative reconstruction, and/or weight based dosing when appropriate to reduce radiation dose to as low as reasonably achievable. XR CHEST PORTABLE    Result Date: 8/26/2021  EXAMINATION: XR CHEST PORTABLE CLINICAL HISTORY: RESPIRATORY FAILURE COMPARISONS: AUGUST 24, 2021 FINDINGS: Tip of endotracheal tube 3.4 cm superior to ej. Nasogastric tube courses beneath diaphragm and into stomach. Right jugular vein central line tip in superior vena cava. Osseous structures intact. Cardiopericardial silhouette normal. Ill-defined areas of increased opacity found predominantly in bilateral lower lung zones, and to lesser extent, mid and upper lung zones.      INTERSTITIAL AND ALVEOLAR EDEMA VERSUS ATELECTASIS/PNEUMONIA AS DISCUSSED. XR CHEST PORTABLE    Result Date: 8/24/2021  EXAMINATION: XR CHEST PORTABLE CLINICAL HISTORY: RESPIRATORY FAILURE COMPARISONS: CT CHEST, AUGUST 22, 2021, CHEST RADIOGRAPH, SAME DATE FINDINGS: Tip of endotracheal tube, 8.4 cm superior to ej. Nasogastric tube courses beyond the inferior extent of films beneath diaphragm. Tip of right jugular vein central line in superior vena cava. Osseous structures intact. Cardiopericardial silhouette upper limits of normal. Ill-defined reticular and alveolar opacities visualized in the right lower, and left mid and left lower lung zones. BORDERLINE CARDIOMEGALY WITH BILATERAL ATELECTASIS/PNEUMONIA VERSUS EDEMA. XR CHEST PORTABLE    Result Date: 8/22/2021  EXAMINATION: XR CHEST PORTABLE, XR CHEST PORTABLE. DATE AND TIME:8/22/2021 7:31 AM CLINICAL HISTORY: Shortness of breath   COVID  COMPARISONS: August 20. FINDINGS: Persistent bilateral airspace opacities with no significant change. No pneumothorax. No definite effusion. Extensive bilateral airspace infiltrates EXAMINATION: XR CHEST PORTABLE, XR CHEST PORTABLE. DATE AND TIME:8/22/2021 at 07;37 hours. CLINICAL HISTORY: Shortness of breath   COVID  COMPARISONS: None  FINDINGS: There now is an NG tube seen coursing into the stomach. Right CVP line placed with tip in the superior vena cava. No pneumothorax. ET tube in position with its tip at the level of the T1 vertebral body. Persistent bilateral airspace opacities. IMPRESSION:  Tubes and life lines in position. Persistent bilateral airspace infiltrates     XR CHEST PORTABLE    Result Date: 8/22/2021  EXAMINATION: XR CHEST PORTABLE, XR CHEST PORTABLE. DATE AND TIME:8/22/2021 7:31 AM CLINICAL HISTORY: Shortness of breath   COVID  COMPARISONS: August 20. FINDINGS: Persistent bilateral airspace opacities with no significant change. No pneumothorax. No definite effusion.      Extensive bilateral airspace infiltrates EXAMINATION: XR CHEST PORTABLE, XR CHEST PORTABLE. DATE AND TIME:8/22/2021 at 07;37 hours. CLINICAL HISTORY: Shortness of breath   COVID  COMPARISONS: None  FINDINGS: There now is an NG tube seen coursing into the stomach. Right CVP line placed with tip in the superior vena cava. No pneumothorax. ET tube in position with its tip at the level of the T1 vertebral body. Persistent bilateral airspace opacities. IMPRESSION:  Tubes and life lines in position. Persistent bilateral airspace infiltrates     XR CHEST PORTABLE    Result Date: 8/20/2021  Exam: XR CHEST PORTABLE History:  resp failure Technique: AP portable view of the chest obtained. Comparison: Chest x-ray from August 18, 2021 Chest x-ray portable Findings: The cardiomediastinal silhouette is within normal limits. There are bilateral patchy alveolar opacities predominantly in the lung bases obscuring the heart borders. . Bones of the thorax appear intact. There are patchy alveolar opacities in both lungs worrisome for viral infiltrates, pneumonia     XR CHEST PORTABLE    Result Date: 8/18/2021  EXAMINATION: CHEST PORTABLE VIEW  CLINICAL HISTORY: Increasing short of breath. Correlate. Follow-up COMPARISONS: August 10, 2021  FINDINGS: Single  views of the chest is submitted. The cardiac silhouette is enlarged Pulmonary vascular unremarkable. Right sided trachea. Persistent multifocal to patchy glass infiltrates throughout the lung parenchyma. No Pneumothoraces. PERSISTENT PATCHY BILATERAL MULTIFOCAL TO GLASS INFILTRATES THROUGHOUT THE LUNG PARENCHYMA    XR CHEST PORTABLE    Result Date: 8/10/2021  EXAMINATION: XR CHEST PORTABLE CLINICAL HISTORY: SHORTNESS OF BREATH COMPARISONS: None available. FINDINGS: Patient leaning to left. Osseous structures intact. Cardiopericardial silhouette enlarged.  Ill-defined areas of increased opacity are found in the right upper and right lower lung, with air bronchogram in the right lower lung. Air bronchogram increased opacity is also found in the left lower lung, and to lesser degree the left midlung zone. BILATERAL ATELECTASIS/PNEUMONIA. CARDIOMEGALY    US DUP UPPER EXTREMITY RIGHT VENOUS    Result Date: 8/10/2021  EXAMINATION: BILATERAL UPPER EXTREMITY DEEP VENOUS ULTRASOUND WITH DOPPLER IMAGING CLINICAL HISTORY:  Upper extremity swelling COMPARISON: None TECHNIQUE:  Cory Mascorro scale ultrasound with compression maneuvers where accessible, color and spectral Doppler of the internal jugular, subclavian, axillary and brachial veins was performed bilaterally. Grey scale with and without compression of the basilic and cephalic  veins was also performed bilaterally. Images were obtained and stored in a permanent archive. RESULT: Examination limited by intravenous lines. RIGHT UPPER EXTREMITY DEEP VEINS: Internal Jugular vein: Normal compression, normal spontaneous flow, Subclavian vein:  Normal, spontaneous flow, Axillary vein:  Normal compression, normal spontaneous flow, Brachial vein:  Normal compression, normal spontaneous flow, RIGHT UPPER EXTREMITY SUPERFICIAL VEINS: Basilic vein: Normal compression Cephalic vein:  Normal compression LEFT UPPER EXTREMITY DEEP VEINS: Internal Jugular vein: Normal compression, normal spontaneous flow, Subclavian vein:  Normal, spontaneous flow, Axillary vein:  Normal compression, normal spontaneous flow, Brachial vein:  Normal compression, normal spontaneous flow, LEFT UPPER EXTREMITY SUPERFICIAL VEINS: Basilic vein: Normal compression Cephalic vein:  Normal compression EXAMINATION: BILATERAL LOWER EXTREMITY DEEP VENOUS ULTRASOUND WITH DOPPLER IMAGING CLINICAL HISTORY: SWELLING COMPARISON: None TECHNIQUE:  Gray scale with compression maneuvers, Color Doppler and Spectral Doppler at rest and with augmentation of the distal external iliac, common femoral, femoral and popliteal veins was performed.   Grey scale with compression maneuvers of the peroneal and posterior tibial veins was performed. The great and small saphenous veins were imaged in grey scale with compression maneuvers at their insertion to the deep system. Imaging was performed in both lower extremities. Images were obtained and stored in a permanent archive. RESULT: RIGHT LOWER EXTREMITY PROXIMAL DEEP VEINS Distal External Iliac and Common Femoral Veins:      Compression: Normal      Doppler: Normal, spontaneous respirophasic flow                     Normal response to augmentation  Femoral vein:      Compression: Normal      Doppler: Normal, spontaneous flow                     Normal response to augmentation Popliteal vein:      Compression: Normal      Doppler: Normal, spontaneous flow                     Normal response to augmentation CALF DEEP VEINS Peroneal veins: Normal compression Posterior tibial veins: Normal compression Gastrocnemius and Soleal veins: Not imaged SUPERFICIAL VEINS Great saphenous: Patent and compressible at insertion into common femoral vein; not otherwise assessed. Small Saphenous:Patent and compressible in the proximal calf, not otherwise assessed. LEFT LOWER EXTREMITY PROXIMAL DEEP VEINS Distal External Iliac and Common Femoral Veins:      Compression: Normal      Doppler: Normal, spontaneous respirophasic flow                      Normal response to augmentation Femoral vein:      Compression: Normal      Doppler: Normal, spontaneous flow                      Normal response to augmentation  Popliteal vein:      Compression: Normal      Doppler: Normal, spontaneous flow                      Normal response to augmentation CALF DEEP VEINS Peroneal veins: Normal compression Posterior tibial veins: Normal compression Gastrocnemius and Soleal veins: Not imaged SUPERFICIAL VEINS Great saphenous: Patent at insertion into common femoral vein; not otherwise assessed. Small Saphenous: Patent and compressible in the proximal calf, not otherwise assessed.      NEGATIVE STUDY FOR ACUTE DVT IN THE RIGHT AND LEFT UPPER EXTREMITIES. NEGATIVE STUDY FOR SUPERFICIAL THROMBOPHLEBITIS IN THE RIGHT AND LEFT  UPPER EXTREMITIES. LIMITED EVALUATION OF THE CALF DUE TO TO PATIENT BODY HABITUS. NEGATIVE STUDY FOR ACUTE PROXIMAL DVT IN THE RIGHT AND LEFT LOWER EXTREMITIES. NEGATIVE STUDY FOR ACUTE CALF DVT IN THE RIGHT AND LEFT LOWER EXTREMITIES. NEGATIVE STUDY FOR SUPERFICIAL THROMBOPHLEBITIS IN THE RIGHT AND LEFT LOWER EXTREMITIES. US DUP UPPER EXTREMITY LEFT VENOUS    Result Date: 8/10/2021  EXAMINATION: BILATERAL UPPER EXTREMITY DEEP VENOUS ULTRASOUND WITH DOPPLER IMAGING CLINICAL HISTORY:  Upper extremity swelling COMPARISON: None TECHNIQUE:  Pervis Balding scale ultrasound with compression maneuvers where accessible, color and spectral Doppler of the internal jugular, subclavian, axillary and brachial veins was performed bilaterally. Grey scale with and without compression of the basilic and cephalic  veins was also performed bilaterally. Images were obtained and stored in a permanent archive. RESULT: Examination limited by intravenous lines.  RIGHT UPPER EXTREMITY DEEP VEINS: Internal Jugular vein: Normal compression, normal spontaneous flow, Subclavian vein:  Normal, spontaneous flow, Axillary vein:  Normal compression, normal spontaneous flow, Brachial vein:  Normal compression, normal spontaneous flow, RIGHT UPPER EXTREMITY SUPERFICIAL VEINS: Basilic vein: Normal compression Cephalic vein:  Normal compression LEFT UPPER EXTREMITY DEEP VEINS: Internal Jugular vein: Normal compression, normal spontaneous flow, Subclavian vein:  Normal, spontaneous flow, Axillary vein:  Normal compression, normal spontaneous flow, Brachial vein:  Normal compression, normal spontaneous flow, LEFT UPPER EXTREMITY SUPERFICIAL VEINS: Basilic vein: Normal compression Cephalic vein:  Normal compression EXAMINATION: BILATERAL LOWER EXTREMITY DEEP VENOUS ULTRASOUND WITH DOPPLER IMAGING CLINICAL HISTORY: SWELLING COMPARISON: None compression Gastrocnemius and Soleal veins: Not imaged SUPERFICIAL VEINS Great saphenous: Patent at insertion into common femoral vein; not otherwise assessed. Small Saphenous: Patent and compressible in the proximal calf, not otherwise assessed. NEGATIVE STUDY FOR ACUTE DVT IN THE RIGHT AND LEFT UPPER EXTREMITIES. NEGATIVE STUDY FOR SUPERFICIAL THROMBOPHLEBITIS IN THE RIGHT AND LEFT  UPPER EXTREMITIES. LIMITED EVALUATION OF THE CALF DUE TO TO PATIENT BODY HABITUS. NEGATIVE STUDY FOR ACUTE PROXIMAL DVT IN THE RIGHT AND LEFT LOWER EXTREMITIES. NEGATIVE STUDY FOR ACUTE CALF DVT IN THE RIGHT AND LEFT LOWER EXTREMITIES. NEGATIVE STUDY FOR SUPERFICIAL THROMBOPHLEBITIS IN THE RIGHT AND LEFT LOWER EXTREMITIES. US DUP LOWER EXTREMITIES BILATERAL VENOUS    Result Date: 8/10/2021  EXAMINATION: BILATERAL UPPER EXTREMITY DEEP VENOUS ULTRASOUND WITH DOPPLER IMAGING CLINICAL HISTORY:  Upper extremity swelling COMPARISON: None TECHNIQUE:  Rush Spotted scale ultrasound with compression maneuvers where accessible, color and spectral Doppler of the internal jugular, subclavian, axillary and brachial veins was performed bilaterally. Grey scale with and without compression of the basilic and cephalic  veins was also performed bilaterally. Images were obtained and stored in a permanent archive. RESULT: Examination limited by intravenous lines.  RIGHT UPPER EXTREMITY DEEP VEINS: Internal Jugular vein: Normal compression, normal spontaneous flow, Subclavian vein:  Normal, spontaneous flow, Axillary vein:  Normal compression, normal spontaneous flow, Brachial vein:  Normal compression, normal spontaneous flow, RIGHT UPPER EXTREMITY SUPERFICIAL VEINS: Basilic vein: Normal compression Cephalic vein:  Normal compression LEFT UPPER EXTREMITY DEEP VEINS: Internal Jugular vein: Normal compression, normal spontaneous flow, Subclavian vein:  Normal, spontaneous flow, Axillary vein:  Normal compression, normal spontaneous flow, Brachial vein: Normal compression, normal spontaneous flow, LEFT UPPER EXTREMITY SUPERFICIAL VEINS: Basilic vein: Normal compression Cephalic vein:  Normal compression EXAMINATION: BILATERAL LOWER EXTREMITY DEEP VENOUS ULTRASOUND WITH DOPPLER IMAGING CLINICAL HISTORY: SWELLING COMPARISON: None TECHNIQUE:  Gray scale with compression maneuvers, Color Doppler and Spectral Doppler at rest and with augmentation of the distal external iliac, common femoral, femoral and popliteal veins was performed. Grey scale with compression maneuvers of the peroneal and posterior tibial veins was performed. The great and small saphenous veins were imaged in grey scale with compression maneuvers at their insertion to the deep system. Imaging was performed in both lower extremities. Images were obtained and stored in a permanent archive. RESULT: RIGHT LOWER EXTREMITY PROXIMAL DEEP VEINS Distal External Iliac and Common Femoral Veins:      Compression: Normal      Doppler: Normal, spontaneous respirophasic flow                     Normal response to augmentation  Femoral vein:      Compression: Normal      Doppler: Normal, spontaneous flow                     Normal response to augmentation Popliteal vein:      Compression: Normal      Doppler: Normal, spontaneous flow                     Normal response to augmentation CALF DEEP VEINS Peroneal veins: Normal compression Posterior tibial veins: Normal compression Gastrocnemius and Soleal veins: Not imaged SUPERFICIAL VEINS Great saphenous: Patent and compressible at insertion into common femoral vein; not otherwise assessed. Small Saphenous:Patent and compressible in the proximal calf, not otherwise assessed.  LEFT LOWER EXTREMITY PROXIMAL DEEP VEINS Distal External Iliac and Common Femoral Veins:      Compression: Normal      Doppler: Normal, spontaneous respirophasic flow                      Normal response to augmentation Femoral vein:      Compression: Normal      Doppler: Normal, spontaneous flow Normal response to augmentation  Popliteal vein:      Compression: Normal      Doppler: Normal, spontaneous flow                      Normal response to augmentation CALF DEEP VEINS Peroneal veins: Normal compression Posterior tibial veins: Normal compression Gastrocnemius and Soleal veins: Not imaged SUPERFICIAL VEINS Great saphenous: Patent at insertion into common femoral vein; not otherwise assessed. Small Saphenous: Patent and compressible in the proximal calf, not otherwise assessed. NEGATIVE STUDY FOR ACUTE DVT IN THE RIGHT AND LEFT UPPER EXTREMITIES. NEGATIVE STUDY FOR SUPERFICIAL THROMBOPHLEBITIS IN THE RIGHT AND LEFT  UPPER EXTREMITIES. LIMITED EVALUATION OF THE CALF DUE TO TO PATIENT BODY HABITUS. NEGATIVE STUDY FOR ACUTE PROXIMAL DVT IN THE RIGHT AND LEFT LOWER EXTREMITIES. NEGATIVE STUDY FOR ACUTE CALF DVT IN THE RIGHT AND LEFT LOWER EXTREMITIES. NEGATIVE STUDY FOR SUPERFICIAL THROMBOPHLEBITIS IN THE RIGHT AND LEFT LOWER EXTREMITIES. Results:  CBC:   Recent Labs     08/27/21  0526 08/27/21  1103 08/28/21  0548 08/28/21  1102 08/29/21  0423 08/29/21  0600 08/29/21  0830   WBC 7.9  --  13.3*  --   --  10.0  --    HGB 13.0*   < > 12.8*   < > 11.0* 11.1* 11.2*   HCT 40.4*  --  39.6*  --   --  33.9*  --    MCV 89.6  --  89.0  --   --  89.3  --      --  266  --   --  240  --     < > = values in this interval not displayed.     :   Recent Labs     08/27/21  0555 08/27/21  1103 08/28/21  0549 08/28/21  1102 08/29/21  0423 08/29/21  0600 08/29/21  0830   *  --  152*  --   --  143  --    K 4.3  4.3  --  3.5  3.5  --   --  3.2*  3.2*  --    *  --  112*  --   --  104  --    CO2 32*  --  31  --   --  29  --    PHOS 3.3  --  2.8  --   --  2.7  --    BUN 23*  --  20  --   --  17  --    CREATININE 0.85   < > 0.55*   < > 0.9 0.72 1.0    < > = values in this interval not displayed.      LIVER PROFILE:   Recent Labs     08/27/21  0555 08/28/21  0549 08/29/21  0600   AST

## 2021-08-29 NOTE — PROGRESS NOTES
Pharmacy Note  Vancomycin Consult    Eleni Barber is a 61 y.o. male started on Vancomycin for bloodstream infection; consult received from Dr. Linda García to manage therapy. Also receiving the following antibiotics: Zosyn. Hypoxia [R09.02]  Acute respiratory failure with hypoxia (HCC) [J96.01]  COVID-19 [U07.1]  Allergies: Patient has no known allergies. Temp max: 101.5 F    Cultures  Recent Labs     08/28/21  0548 08/28/21  0250 08/27/21  1617 08/27/21  1617 08/23/21  1114 08/23/21  0949 08/10/21  0933   BC  --   --   --  No Growth to date. Any change in status will be called. --    < >  --    BLOODCULT2  --   --   --  1 out of 2 blood cultures*  The following organisms and resistance markers have been  tested using nucleic acid testing technology. ORGANISMS:  Staphylococcus aureus, Staphylococcus epidermidis,  Staphylococcus lugdunensis,Staphylococcus coagulase-negative,  Enterococcus faecalis, Enterococcus faecium, Listeria species,  Streptococcus pneumoniae, Streptococcus pyogenes (Gp A),  Streptococcus agalactiae (Gp B), Streptococcus anginosus gp,  Streptococcus species. METHICILLIN RESISTANCE MARKER:  mec-A  VANCOMYCIN RESISTANCE MARKERS:  van-A  van-B    POSITIVE for  This organism was isolated in one set. Susceptibility testing is not routinely done as this  organism frequently represents skin contamination. Additional testing can be ordered by calling the  Microbiology Department.    --    < >  --    LABGRAM  --  Moderate WBC's  Moderate Gram negative rods  Moderate Gram negative diplococci  Few Yeast    --   --   --    < >  --    ORG  --  Gram negative sanjana*   < > Staphylococcus lugdunensis DNA Detected*  Staphylococcus coagulase-negative*  --    < >  --    LABURIN No growth 24 hours  --   --   --    < >   < >  --    COVID19  --   --   --   --   --   --  DETECTED*    < > = values in this interval not displayed.      Height: 6' (182.9 cm), Weight: 242 lb 8.1 oz (110 kg), Body mass index is 32.89 kg/m².     MRSA Nasal swab:N/a, does not meet criteria    Recent Labs     08/29/21  0830   CREATININE 1.0   Estimated Creatinine Clearance: 104 mL/min (based on SCr of 1 mg/dL). .    Goal AUC/YESSICA: 400-600 mg/L*hr    Assessment/Plan:  Will initiate Vancomycin with a one time loading dose of 1750 mg x1, followed by 1250 mg IV every 12 hours. Bayesian model predicts:      mg/L*hr   Trough concentration at steady state 17.1 mg/L   Probability AUC is therapeutic 77%   Probability trough >20 mg/L 36%   Probability of nephrotoxicity 13%     Anticipate next level to be on 8/31 with morning labs (0600). Timing of future trough levels may be adjusted based on culture results, renal function, and clinical response. Thank you for the consult. Will continue to follow.     Peggy Navarrete, GaroD  PGY-1 Pharmacy Resident  8/29/2021 6:19 PM

## 2021-08-29 NOTE — PROGRESS NOTES
0.1 08/29/2021     CMP:    Lab Results   Component Value Date     08/29/2021    K 3.2 08/29/2021    K 3.2 08/29/2021     08/29/2021    CO2 29 08/29/2021    BUN 17 08/29/2021    CREATININE 1.0 08/29/2021    CREATININE 0.72 08/29/2021    GFRAA >60 08/29/2021    LABGLOM >60 08/29/2021    GLUCOSE 96 08/29/2021    PROT 5.2 08/29/2021    LABALBU 2.1 08/29/2021    CALCIUM 8.5 08/29/2021    BILITOT 0.4 08/29/2021    ALKPHOS 90 08/29/2021    AST 21 08/29/2021    ALT 26 08/29/2021     BMP:    Lab Results   Component Value Date     08/29/2021    K 3.2 08/29/2021    K 3.2 08/29/2021     08/29/2021    CO2 29 08/29/2021    BUN 17 08/29/2021    LABALBU 2.1 08/29/2021    CREATININE 1.0 08/29/2021    CREATININE 0.72 08/29/2021    CALCIUM 8.5 08/29/2021    GFRAA >60 08/29/2021    LABGLOM >60 08/29/2021    GLUCOSE 96 08/29/2021     Magnesium:    Lab Results   Component Value Date    MG 1.9 08/29/2021     Troponin:    Lab Results   Component Value Date    TROPONINI <0.010 08/10/2021        Active Hospital Problems    Diagnosis Date Noted    Acute respiratory failure with hypoxia (HCC) [J96.01]      Priority: Low    Pneumonia due to COVID-19 virus [U07.1, J12.82] 08/10/2021     Priority: Low    New onset type 2 diabetes mellitus ([address] [E11.9]      Priority: Low    Hypoxia [R09.02]      Priority: Low        Assessment/Plan:  1. Respiratory failure- vented. CXR looks little worse today. Images reviewed. 2. COVID PNA- severe. continued Fever and respiratory failure. 3. Tachybrady- keep on Telemetry. K and Mg WNL  4. HTN - labile- will observe. 5. obtain Echo.         Electronically signed by Ernestina Rajput MD on 8/29/2021 at 1:29 PM

## 2021-08-29 NOTE — PROGRESS NOTES
Department of Internal Medicine  General Internal Medicine  Attending Progress Note      SUBJECTIVE:  Pt seen through ICU door to reduce exposure to COVID-19. Pt intubated and in the supine position.  On pressors    OBJECTIVE      Medications    Current Facility-Administered Medications: dexmedetomidine (PRECEDEX) 400 mcg in sodium chloride 0.9 % 100 mL infusion, 0.2-1.4 mcg/kg/hr, IntraVENous, Continuous  acetaminophen (TYLENOL) tablet 650 mg, 650 mg, Oral, Q4H PRN **OR** acetaminophen (TYLENOL) suppository 650 mg, 650 mg, Rectal, Q4H PRN  norepinephrine (LEVOPHED) 16 mg in dextrose 5% 250 mL infusion, 2-100 mcg/min, IntraVENous, Continuous  insulin glargine (LANTUS) injection vial 35 Units, 35 Units, Subcutaneous, BID  insulin lispro (HUMALOG) injection vial 4 Units, 4 Units, Subcutaneous, 4x Daily  docusate (COLACE) 50 MG/5ML liquid 100 mg, 100 mg, Per NG tube, BID  insulin lispro (HUMALOG) injection vial 0-12 Units, 0-12 Units, Subcutaneous, Q4H  fentaNYL (SUBLIMAZE) 1,000 mcg in sodium chloride 0.9 % 100 mL infusion, 12.5-200 mcg/hr, IntraVENous, Continuous  piperacillin-tazobactam (ZOSYN) 3,375 mg in dextrose 5 % 50 mL IVPB extended infusion (mini-bag), 3,375 mg, IntraVENous, Q8H  chlorhexidine (PERIDEX) 0.12 % solution 15 mL, 15 mL, Mouth/Throat, BID  pantoprazole (PROTONIX) injection 40 mg, 40 mg, IntraVENous, Daily **AND** sodium chloride (PF) 0.9 % injection 10 mL, 10 mL, IntraVENous, Daily  propofol injection, 5-50 mcg/kg/min, IntraVENous, Titrated  enoxaparin (LOVENOX) injection 105 mg, 1 mg/kg, Subcutaneous, BID  guaiFENesin-codeine (GUAIFENESIN AC) 100-10 MG/5ML liquid 5 mL, 5 mL, Oral, Q4H PRN  polyethylene glycol (GLYCOLAX) packet 17 g, 17 g, Oral, Daily  sodium chloride flush 0.9 % injection 5-40 mL, 5-40 mL, IntraVENous, 2 times per day  sodium chloride flush 0.9 % injection 5-40 mL, 5-40 mL, IntraVENous, PRN  0.9 % sodium chloride infusion, 25 mL, IntraVENous, PRN  ondansetron (ZOFRAN-ODT) disintegrating tablet 4 mg, 4 mg, Oral, Q8H PRN **OR** ondansetron (ZOFRAN) injection 4 mg, 4 mg, IntraVENous, Q6H PRN  polyethylene glycol (GLYCOLAX) packet 17 g, 17 g, Oral, Daily PRN  potassium chloride 10 mEq/100 mL IVPB (Peripheral Line), 10 mEq, IntraVENous, PRN  magnesium sulfate 2000 mg in 50 mL IVPB premix, 2,000 mg, IntraVENous, PRN  ipratropium-albuterol (DUONEB) nebulizer solution 1 ampule, 1 ampule, Inhalation, Q4H PRN  hydrALAZINE (APRESOLINE) injection 10 mg, 10 mg, IntraVENous, Q6H PRN  traZODone (DESYREL) tablet 100 mg, 100 mg, Oral, Nightly  glucose (GLUTOSE) 40 % oral gel 15 g, 15 g, Oral, PRN  dextrose 50 % IV solution, 12.5 g, IntraVENous, PRN  glucagon (rDNA) injection 1 mg, 1 mg, IntraMUSCular, PRN  dextrose 5 % solution, 100 mL/hr, IntraVENous, PRN  Physical    VITALS:  BP (!) 142/64   Pulse 61   Temp 100.9 °F (38.3 °C) (Bladder)   Resp 18   Ht 6' (1.829 m)   Wt 242 lb 8.1 oz (110 kg)   SpO2 93%   BMI 32.89 kg/m²   Constitutional: intubated and sedated  Head: Normocephalic, atraumatic  Lungs: no tachypnea, on the ventilator  Heart: tachycardic on tele  GI: obese     Data    CBC:   Lab Results   Component Value Date    WBC 10.0 08/29/2021    RBC 3.79 08/29/2021    HGB 11.2 08/29/2021    HCT 33.9 08/29/2021    MCV 89.3 08/29/2021    MCH 29.3 08/29/2021    MCHC 32.8 08/29/2021    RDW 13.4 08/29/2021     08/29/2021     CMP:    Lab Results   Component Value Date     08/29/2021    K 3.2 08/29/2021    K 3.2 08/29/2021     08/29/2021    CO2 29 08/29/2021    BUN 17 08/29/2021    CREATININE 1.0 08/29/2021    CREATININE 0.72 08/29/2021    GFRAA >60 08/29/2021    LABGLOM >60 08/29/2021    GLUCOSE 96 08/29/2021    PROT 5.2 08/29/2021    LABALBU 2.1 08/29/2021    CALCIUM 8.5 08/29/2021    BILITOT 0.4 08/29/2021    ALKPHOS 90 08/29/2021    AST 21 08/29/2021    ALT 26 08/29/2021       ASSESSMENT AND PLAN      # Acute hypoxic respiratory failure 2/2 COVID-19 pneumonia  - was intubated on 8/22  - sedated, paralyzed. Prone as tolerated  - critical care consulted  - completed course of remdesivir, actemra  - continuing decadron, lovenox  - ID consulted  - Resp Cx with acinetobacter and klebsiella  - empiric zosyn    # New onset diabetes  - cont lantus, ISS  - endocrinology consulted    DVT: lovenox per covid protocol    Disposition: Pt continues to be on vent. Critical care managing. Continuing decadron, zosyn. ID consulted.        Esther Nino DO  Internal Medicine

## 2021-08-29 NOTE — PROGRESS NOTES
Shift Summary     Shift assessment performed. Patient remains intubated and sedated at this time. Patient continues to remain on max doses of propofol, fentanyl, and precedex was added today as well. Patient was coughing and breathing over vent at approximately 0740 and precedex was started. Patient vss thoughout the shift. Patient remains on 2 mcg of Levophed throughout shift. Patient being turned Q2 hours. Sacrum intact however other skin break down noted on bottom lip, bilat ear lobes, and bridge of nose. Patient's son came to visit today and was given all of patients belongings aside from glasses, boots, and a small notebook that is in the lockbox. Patient yet to have a bowel movement since Wednesday morning (8/25/21). Patient has thick tan/ blood tinged secretions that require oral suctioning often. Patient placed back on cooling blanket for a temp of 38.7.      Electronically signed by Gricelda Fleming RN on 8/29/2021 at 6:23 PM

## 2021-08-29 NOTE — PLAN OF CARE
Problem: Falls - Risk of:  Goal: Will remain free from falls  Description: Will remain free from falls  8/29/2021 0214 by Michael Jang RN  Outcome: Ongoing  8/28/2021 1519 by Gricelda Fleming RN  Outcome: Ongoing  8/28/2021 1517 by Gricelda Fleming RN  Outcome: Ongoing  Goal: Absence of physical injury  Description: Absence of physical injury  8/29/2021 0214 by Michael Jang RN  Outcome: Ongoing  8/28/2021 1519 by Gricelda Fleming RN  Outcome: Ongoing  8/28/2021 1517 by Gricelda Fleming RN  Outcome: Ongoing     Problem: Airway Clearance - Ineffective  Goal: Achieve or maintain patent airway  8/29/2021 0214 by Michael Jang RN  Outcome: Ongoing  8/28/2021 1519 by Gricelda Fleming RN  Outcome: Ongoing  8/28/2021 1517 by Gricelda Fleming RN  Outcome: Ongoing     Problem: Gas Exchange - Impaired  Goal: Absence of hypoxia  8/29/2021 0214 by Michael Jang RN  Outcome: Ongoing  8/28/2021 1519 by Gricelda Fleming RN  Outcome: Ongoing  8/28/2021 1517 by Gricelda Fleming RN  Outcome: Ongoing  Goal: Promote optimal lung function  8/29/2021 0214 by Michael Jang RN  Outcome: Ongoing  8/28/2021 1519 by Gricelda Fleming RN  Outcome: Ongoing  8/28/2021 1517 by Gricelda Fleming RN  Outcome: Ongoing     Problem: Breathing Pattern - Ineffective  Goal: Ability to achieve and maintain a regular respiratory rate  8/29/2021 0214 by Michael Jang RN  Outcome: Ongoing  8/28/2021 1519 by Gricelda Fleming RN  Outcome: Ongoing  8/28/2021 1517 by Gricelda Fleming RN  Outcome: Ongoing     Problem:  Body Temperature -  Risk of, Imbalanced  Goal: Ability to maintain a body temperature within defined limits  8/29/2021 0214 by Michael Jang RN  Outcome: Ongoing  8/28/2021 1519 by Gricelda Fleming RN  Outcome: Ongoing  8/28/2021 1517 by Gricelda Fleming RN  Outcome: Ongoing  Goal: Will regain or maintain usual level of consciousness  8/29/2021 0214 by Michael Jang RN  Outcome: Ongoing  8/28/2021 1519 by Gricelda Fleming RN  Outcome: Ongoing  8/28/2021 1517 by Gricelda Fleming, RN  Outcome: Ongoing  Goal: Complications related to the disease process, condition or treatment will be avoided or minimized  8/29/2021 0214 by Anuradha Xie RN  Outcome: Ongoing  8/28/2021 1519 by Stefano Healy RN  Outcome: Ongoing  8/28/2021 1517 by Stefano Healy RN  Outcome: Ongoing     Problem: Isolation Precautions - Risk of Spread of Infection  Goal: Prevent transmission of infection  8/29/2021 0214 by Anuradha Xie RN  Outcome: Ongoing  8/28/2021 1519 by Stefano Healy RN  Outcome: Ongoing  8/28/2021 1517 by Stefano Healy RN  Outcome: Ongoing     Problem: Nutrition Deficits  Goal: Optimize nutritional status  8/29/2021 0214 by Anuradha iXe RN  Outcome: Ongoing  8/28/2021 1519 by Stefano Healy RN  Outcome: Ongoing  8/28/2021 1517 by Stefano Healy RN  Outcome: Ongoing     Problem: Risk for Fluid Volume Deficit  Goal: Maintain normal heart rhythm  8/29/2021 0214 by Anuradha Xie RN  Outcome: Ongoing  8/28/2021 1519 by Stefano Healy RN  Outcome: Ongoing  8/28/2021 1517 by Stefano Healy RN  Outcome: Ongoing  Goal: Maintain absence of muscle cramping  8/29/2021 0214 by Anuradha Xie RN  Outcome: Ongoing  8/28/2021 1519 by Stefano Healy RN  Outcome: Ongoing  8/28/2021 1517 by Stefano Healy RN  Outcome: Ongoing  Goal: Maintain normal serum potassium, sodium, calcium, phosphorus, and pH  8/29/2021 0214 by Anuradha Xie RN  Outcome: Ongoing  8/28/2021 1519 by Stefano Healy RN  Outcome: Ongoing  8/28/2021 1517 by Stefano Healy RN  Outcome: Ongoing     Problem: Loneliness or Risk for Loneliness  Goal: Demonstrate positive use of time alone when socialization is not possible  8/29/2021 0214 by Anuradha Xie RN  Outcome: Ongoing  8/28/2021 1519 by Stefano Healy RN  Outcome: Ongoing  8/28/2021 1517 by Stefano Niraj, RN  Outcome: Ongoing     Problem: Fatigue  Goal: Verbalize increase energy and improved vitality  8/29/2021 0214 by Anuradha Xie RN  Outcome: Ongoing  8/28/2021 1519 by Stefano Healy, RN  Outcome: Ongoing  8/28/2021 1517 by Yoselin Finley RN  Outcome: Ongoing     Problem: Patient Education: Go to Patient Education Activity  Goal: Patient/Family Education  8/29/2021 0214 by Anna Orellana RN  Outcome: Ongoing  8/28/2021 1519 by Yoselin Finley RN  Outcome: Ongoing  8/28/2021 1517 by Yoselin Finley RN  Outcome: Ongoing     Problem: Skin Integrity:  Goal: Will show no infection signs and symptoms  Description: Will show no infection signs and symptoms  8/29/2021 0214 by Anna Orellana RN  Outcome: Ongoing  8/28/2021 1519 by Yoselin Finley RN  Outcome: Ongoing  8/28/2021 1517 by Yoselin Finley RN  Outcome: Ongoing  Goal: Absence of new skin breakdown  Description: Absence of new skin breakdown  8/29/2021 0214 by Anna Orellana RN  Outcome: Ongoing  8/28/2021 1519 by Yoselin Finley RN  Outcome: Ongoing  8/28/2021 1517 by Yoselin Finley RN  Outcome: Ongoing     Problem: Pain:  Goal: Pain level will decrease  Description: Pain level will decrease  8/29/2021 0214 by Anna Orellana RN  Outcome: Ongoing  8/28/2021 1519 by Yoselin Finley RN  Outcome: Ongoing  8/28/2021 1517 by Yoselin Finley RN  Outcome: Ongoing  Goal: Control of acute pain  Description: Control of acute pain  8/29/2021 0214 by Anna Orellana RN  Outcome: Ongoing  8/28/2021 1519 by Yoselin Finley RN  Outcome: Ongoing  8/28/2021 1517 by Yoselin Finley RN  Outcome: Ongoing  Goal: Control of chronic pain  Description: Control of chronic pain  8/29/2021 0214 by Anna Orellana RN  Outcome: Ongoing  8/28/2021 1519 by Yoselin Finley RN  Outcome: Ongoing  8/28/2021 1517 by Yoselin Finley RN  Outcome: Ongoing     Problem: Non-Violent Restraints  Goal: Removal from restraints as soon as assessed to be safe  8/29/2021 0214 by Anna Orellana RN  Outcome: Ongoing  8/28/2021 1519 by Yoselin Finley RN  Outcome: Ongoing  8/28/2021 1517 by Yoselin Finley RN  Outcome: Ongoing  Goal: No harm/injury to patient while restraints in use  8/29/2021 0214 by Anna Orellana, RN  Outcome: Ongoing  8/28/2021 1519 by Jeremie Ventura RN  Outcome: Ongoing  8/28/2021 1517 by Jeremie Ventura RN  Outcome: Ongoing  Goal: Patient's dignity will be maintained  8/29/2021 0214 by Ro Chavez RN  Outcome: Ongoing  8/28/2021 1519 by Jeremie Ventura RN  Outcome: Ongoing  8/28/2021 1517 by Jeremie Ventura RN  Outcome: Ongoing     Problem: Nutrition  Goal: Optimal nutrition therapy  8/29/2021 0214 by Ro Chavez RN  Outcome: Ongoing  8/28/2021 1519 by Jeremie Ventura RN  Outcome: Ongoing  8/28/2021 1517 by Jeremie Ventura RN  Outcome: Ongoing

## 2021-08-30 NOTE — PROGRESS NOTES
Department of Internal Medicine  General Internal Medicine  Attending Progress Note      SUBJECTIVE:  Pt seen through ICU door to reduce exposure to COVID-19. Pt intubated and in the supine position.  On pressors    OBJECTIVE      Medications    Current Facility-Administered Medications: lactulose (CHRONULAC) 10 GM/15ML solution 20 g, 20 g, Oral, TID  bisacodyl (DULCOLAX) suppository 10 mg, 10 mg, Rectal, Once  clonazePAM (KLONOPIN) tablet 0.5 mg, 0.5 mg, Oral, TID  vancomycin (VANCOCIN) intermittent dosing (placeholder), , Other, RX Placeholder  [COMPLETED] vancomycin (VANCOCIN) 1,750 mg in dextrose 5 % 500 mL IVPB, 1,750 mg, IntraVENous, Once **FOLLOWED BY** vancomycin (VANCOCIN) 1,250 mg in dextrose 5 % 250 mL IVPB, 1,250 mg, IntraVENous, Q12H  insulin glargine (LANTUS) injection vial 30 Units, 30 Units, SubCUTAneous, BID  dexmedetomidine (PRECEDEX) 400 mcg in sodium chloride 0.9 % 100 mL infusion, 0.2-1.4 mcg/kg/hr, IntraVENous, Continuous  acetaminophen (TYLENOL) tablet 650 mg, 650 mg, Oral, Q4H PRN **OR** acetaminophen (TYLENOL) suppository 650 mg, 650 mg, Rectal, Q4H PRN  insulin lispro (HUMALOG) injection vial 4 Units, 4 Units, SubCUTAneous, 4x Daily  docusate (COLACE) 50 MG/5ML liquid 100 mg, 100 mg, Per NG tube, BID  insulin lispro (HUMALOG) injection vial 0-12 Units, 0-12 Units, SubCUTAneous, Q4H  fentaNYL (SUBLIMAZE) 1,000 mcg in sodium chloride 0.9 % 100 mL infusion, 12.5-200 mcg/hr, IntraVENous, Continuous  piperacillin-tazobactam (ZOSYN) 3,375 mg in dextrose 5 % 50 mL IVPB extended infusion (mini-bag), 3,375 mg, IntraVENous, Q8H  chlorhexidine (PERIDEX) 0.12 % solution 15 mL, 15 mL, Mouth/Throat, BID  pantoprazole (PROTONIX) injection 40 mg, 40 mg, IntraVENous, Daily **AND** sodium chloride (PF) 0.9 % injection 10 mL, 10 mL, IntraVENous, Daily  propofol injection, 5-50 mcg/kg/min, IntraVENous, Titrated  enoxaparin (LOVENOX) injection 105 mg, 1 mg/kg, SubCUTAneous, BID  guaiFENesin-codeine (GUAIFENESIN AC) 100-10 MG/5ML liquid 5 mL, 5 mL, Oral, Q4H PRN  polyethylene glycol (GLYCOLAX) packet 17 g, 17 g, Oral, Daily  sodium chloride flush 0.9 % injection 5-40 mL, 5-40 mL, IntraVENous, 2 times per day  sodium chloride flush 0.9 % injection 5-40 mL, 5-40 mL, IntraVENous, PRN  0.9 % sodium chloride infusion, 25 mL, IntraVENous, PRN  ondansetron (ZOFRAN-ODT) disintegrating tablet 4 mg, 4 mg, Oral, Q8H PRN **OR** ondansetron (ZOFRAN) injection 4 mg, 4 mg, IntraVENous, Q6H PRN  polyethylene glycol (GLYCOLAX) packet 17 g, 17 g, Oral, Daily PRN  potassium chloride 10 mEq/100 mL IVPB (Peripheral Line), 10 mEq, IntraVENous, PRN  magnesium sulfate 2000 mg in 50 mL IVPB premix, 2,000 mg, IntraVENous, PRN  ipratropium-albuterol (DUONEB) nebulizer solution 1 ampule, 1 ampule, Inhalation, Q4H PRN  hydrALAZINE (APRESOLINE) injection 10 mg, 10 mg, IntraVENous, Q6H PRN  glucose (GLUTOSE) 40 % oral gel 15 g, 15 g, Oral, PRN  dextrose 50 % IV solution, 12.5 g, IntraVENous, PRN  glucagon (rDNA) injection 1 mg, 1 mg, IntraMUSCular, PRN  dextrose 5 % solution, 100 mL/hr, IntraVENous, PRN  Physical    VITALS:  BP (!) 180/72   Pulse 128   Temp 99 °F (37.2 °C) (Bladder)   Resp 20   Ht 6' (1.829 m)   Wt 242 lb 8.1 oz (110 kg)   SpO2 93%   BMI 32.89 kg/m²   Constitutional: intubated and sedated  Head: Normocephalic, atraumatic  Lungs: no tachypnea, on the ventilator  Heart: tachycardic on tele  GI: obese     Data    CBC:   Lab Results   Component Value Date    WBC 8.6 08/30/2021    RBC 4.08 08/30/2021    HGB 14.2 08/30/2021    HCT 36.4 08/30/2021    MCV 89.1 08/30/2021    MCH 30.0 08/30/2021    MCHC 33.7 08/30/2021    RDW 13.6 08/30/2021     08/30/2021     CMP:    Lab Results   Component Value Date     08/30/2021    K 3.3 08/30/2021    K 3.3 08/30/2021    CL 97 08/30/2021    CO2 29 08/30/2021    BUN 15 08/30/2021    CREATININE 1.0 08/30/2021    CREATININE 0.56 08/30/2021    GFRAA >60 08/30/2021    LABGLOM >60 08/30/2021 GLUCOSE 163 08/30/2021    PROT 6.0 08/30/2021    LABALBU 2.5 08/30/2021    CALCIUM 8.8 08/30/2021    BILITOT 0.4 08/30/2021    ALKPHOS 107 08/30/2021    AST 17 08/30/2021    ALT 23 08/30/2021       ASSESSMENT AND PLAN      # Acute hypoxic respiratory failure 2/2 COVID-19 pneumonia  - was intubated on 8/22  - sedated, paralyzed. Prone as tolerated  - critical care consulted  - completed course of remdesivir, actemra  - continuing decadron, lovenox  - ID consulted  - Resp Cx with acinetobacter and klebsiella  - empiric zosyn    # New onset diabetes  - cont lantus, ISS  - endocrinology consulted    DVT: lovenox per covid protocol    Disposition: Pt continues to be on vent. Critical care managing. Continuing decadron, zosyn. ID consulted. 20-day documentation  50-year-old male admitted with hypoxic respiratory failure secondary to COVID-19 pneumonia, intubated 8/22 status post remdesivir Actemra and day 10 Decadron  Continued to be intubated. Unable to wean. On zosyn for gram negative pneumonia and levophed.  Critical pt. ?trach as patient unable to wean from vent        Young Nicholas, DO   Internal Medicine

## 2021-08-30 NOTE — PROGRESS NOTES
Pulmonary & Critical Care Medicine ICU Progress Note  Chief complaint : Severe acute hypoxic respiratory failure    Subjunctive/24 hour events :   Patient seen and examined during multidisciplinary rounds with RN, charge nurse, RT, pharmacy, dietitian, and social service. Patient on vent, sedated, unresponsive, when sedation is light he became anxious, blood pressure high, he is currently on CPAP trial, saturation on the low side, T-max yesterday at 8 PM 38.3, today T-max 37.2, he is on 40% FiO2 and 8 of PEEP, his saturation on vent support is 93 to 95%, urine output 3 L, is +1 L, no bowel movement, tolerates tube feed, no vomiting. Social History     Tobacco Use    Smoking status: Never Smoker    Smokeless tobacco: Never Used   Substance Use Topics    Alcohol use: Never     History reviewed. No pertinent family history. Recent Labs     08/29/21 0830 08/30/21  0855   PHART 7.392 7.368   RYQ1WRU 50* 55*   PO2ART 62* 59*       MV Settings:  Vent Mode: CPAP Rate Set: 30 bmp/Vt Ordered: 480 mL/ /FiO2 : 40 %           IV:   DOPamine Stopped (08/30/21 0516)    dexmedetomidine Stopped (08/29/21 2005)    norepinephrine Stopped (08/29/21 2248)    fentaNYL (SUBLIMAZE) infusion 175 mcg/hr (08/30/21 0402)    propofol 45 mcg/kg/min (08/30/21 0828)    sodium chloride      dextrose         Vitals:  BP (!) 144/66   Pulse 128   Temp 99 °F (37.2 °C) (Bladder)   Resp 20   Ht 6' (1.829 m)   Wt 242 lb 8.1 oz (110 kg)   SpO2 93%   BMI 32.89 kg/m²    Tmax:        Intake/Output Summary (Last 24 hours) at 8/30/2021 1006  Last data filed at 8/30/2021 0655  Gross per 24 hour   Intake 5748.01 ml   Output 3075 ml   Net 2673.01 ml       EXAM:  General: Sedated, on vent,   Head: normocephalic, atraumatic  Eyes:No gross abnormalities.   ENT:  MMM no lesions,  ET tube and OG tube  Neck:  supple and no masses  Chest : Good air movement, no wheezing, no rales, nontender, tympanic  Heart[de-identified] Heart sounds are normal.  Regular rate and rhythm without murmur, gallop or rub. ABD:  symmetric, soft, non-tender, no guarding or rebound  Musculoskeletal : no cyanosis, no clubbing and trace edema  Neuro: Sedated, did not follow commands. Skin: No rashes or nodules noted. Lymph node:  no cervical nodes  Urology: Yes [facility]   Psychiatric: Sedated on vent    Medications:  Scheduled Meds:   vancomycin (VANCOCIN) intermittent dosing (placeholder)   Other RX Placeholder    vancomycin  1,250 mg IntraVENous Q12H    insulin glargine  30 Units SubCUTAneous BID    insulin lispro  4 Units SubCUTAneous 4x Daily    docusate  100 mg Per NG tube BID    insulin lispro  0-12 Units SubCUTAneous Q4H    piperacillin-tazobactam  3,375 mg IntraVENous Q8H    chlorhexidine  15 mL Mouth/Throat BID    pantoprazole  40 mg IntraVENous Daily    And    sodium chloride (PF)  10 mL IntraVENous Daily    enoxaparin  1 mg/kg SubCUTAneous BID    polyethylene glycol  17 g Oral Daily    sodium chloride flush  5-40 mL IntraVENous 2 times per day    traZODone  100 mg Oral Nightly       PRN Meds:  acetaminophen **OR** acetaminophen, guaiFENesin-codeine, sodium chloride flush, sodium chloride, ondansetron **OR** ondansetron, polyethylene glycol, potassium chloride, magnesium sulfate, ipratropium-albuterol, hydrALAZINE, glucose, dextrose, glucagon (rDNA), dextrose    Results: reviewed by me   CBC:   Recent Labs     08/28/21  0548 08/28/21  1102 08/29/21  0600 08/29/21  0600 08/29/21  0830 08/30/21  0527 08/30/21  0855   WBC 13.3*  --  10.0  --   --  8.6  --    HGB 12.8*   < > 11.1*   < > 11.2* 12.2* 12.8*   HCT 39.6*  --  33.9*  --   --  36.4*  --    MCV 89.0  --  89.3  --   --  89.1  --      --  240  --   --  243  --     < > = values in this interval not displayed.      BMP:   Recent Labs     08/28/21  0549 08/28/21  1102 08/29/21  0423 08/29/21  0600 08/29/21  0830 08/30/21  0527 08/30/21  0855   *  --   --  143  --  138  --    K 3.5  3.5  --   --  3.2* 3.2*  --  3.3*  3.3*  --    *  --   --  104  --  97  --    CO2 31  --   --  29  --  29  --    PHOS 2.8  --   --  2.7  --  3.0  --    BUN 20  --   --  17  --  15  --    CREATININE 0.55*   < >   < > 0.72 1.0 0.56* 0.6*    < > = values in this interval not displayed. LIVER PROFILE:   Recent Labs     08/28/21  0549 08/29/21  0600 08/30/21  0527   AST 36 21 17   ALT 34 26 23   BILITOT 0.4 0.4 0.4   ALKPHOS 110* 90 107*     PT/INR: No results for input(s): PROTIME, INR in the last 72 hours. APTT: No results for input(s): APTT in the last 72 hours. UA:No results for input(s): NITRITE, COLORU, PHUR, LABCAST, WBCUA, RBCUA, MUCUS, TRICHOMONAS, YEAST, BACTERIA, CLARITYU, SPECGRAV, LEUKOCYTESUR, UROBILINOGEN, BILIRUBINUR, BLOODU, GLUCOSEU, AMORPHOUS in the last 72 hours. Invalid input(s): KETONESU    Cultures:  Gram-negative sanjana in sputum  Films:  Chest x-ray today, ET tube in good position, evolving right lower lobe infiltrate and congestion  Assessment:   This is a critically ill patient at risk of deterioration / death , needing close ICU monitoring and intervention due to below noted problems     · Severe acute hypoxic respiratory failure  · Severe COVID-19 infection   · Bacterial coinfection, gram-negative rods    · Sepsis, and septic shock, patient being placed on Levophed on and off overnight restarting Levophed today  · Hyperglycemia  · Hypercoagulable state    Recommendations  · Vent support lung protective strategy  · head of the bed 30°  · Breathing trial later today  · Sedation with combination propofol and fentanyl target R ASS of 0 to -1  · Watch for ICU delirium: TV on, natural light, avoid benzos, pain control, early mobility, and family engagement  · PUD prophylaxis  · Continue Lovenox therapeutic dose  · Prone for 18 hours in supine for 6  · Failed breathing trial today  · Patient completed 10 days of dexamethasone  · resp cultures  · Antibiotic per ID  · Completed remdesivir treatment · Adjust water flushes, will increase water flushes to 300 cc daily 2 hours  · Lactulose  · Dulcolax suppository  · Target blood sugar 140-180,   · Adjust water flushes  · Appreciate endocrinology  · Monitor urine output and renal function  · DVT prophylaxis  · meatneb TID   · Klonopin 0.5 mg 3 times daily             Due to the immediate potential for life-threatening deterioration due to acute hypoxic respiratory failure, I spent 40   minutes providing critical care. This time is excluding time spent performing procedures.       Had a meeting with patient's son today, patient son requested changing CODE STATUS to DNR CCA        Electronically signed by Anthony Castillo MD,  FCCP ,on 8/30/2021 at 10:06 AM

## 2021-08-30 NOTE — PLAN OF CARE
Nutrition Problem #1: Altered nutrition-related lab values  Intervention: Food and/or Nutrient Delivery: Continue Current Tube Feeding (Continue with Peptide based high protein TF at 20 ml/hr x 24 hrs via OG. Continue with 3 protein modulars per day with water flush.  Decrease water flush to 100 ml every 4 hrs adn monitor Na levels)  Nutritional Goals: New goals : En to deliver kcals/protien within range of estimated needs, iimproved renal labs/glucose

## 2021-08-30 NOTE — CARE COORDINATION
Rounds done w nrsg this am. Pt continues sedated on vent. Pt has son here from [employer] and he is able to see pt through the glass. Per note he states his dad would not want trach done. Continue to monitor and follow for needs. 1600 Sent p/s message to Dr. Jennifer Moore re:20 day note and that is completed .

## 2021-08-30 NOTE — PROGRESS NOTES
Subjective/HPI Pt remains vented and heavily sedated. Reducing slight amount of sedation cause tachycardia and agitation. + Fever    EKG:    currently        Review of Systems:   Review of Systems  Vented. Physical Examination:    BP (!) 184/74   Pulse 134   Temp 100.8 °F (38.2 °C) (Bladder)   Resp (!) 31   Ht 6' (1.829 m)   Wt 242 lb 8.1 oz (110 kg)   SpO2 92%   BMI 32.89 kg/m²    Physical Exam   Constitutional: He appears healthy. No distress. HENT:   Normal cephalic and Atraumatic   Eyes: Pupils are equal, round, and reactive to light. Neck: Thyroid normal. No JVD present. No neck adenopathy. No thyromegaly present. Cardiovascular: Normal rate, regular rhythm, normal heart sounds, intact distal pulses and normal pulses. Pulmonary/Chest: Effort normal and breath sounds normal. He has no wheezes. He has no rales. He exhibits no tenderness. Abdominal: Soft. Bowel sounds are normal. There is no abdominal tenderness. Musculoskeletal:         General: No tenderness or edema. Normal range of motion. Cervical back: Normal range of motion and neck supple. Neurological: He is alert and oriented to person, place, and time. Skin: Skin is warm. No cyanosis. Nails show no clubbing.        LABS:  CBC:   Lab Results   Component Value Date    WBC 8.6 08/30/2021    RBC 4.08 08/30/2021    HGB 14.2 08/30/2021    HCT 36.4 08/30/2021    MCV 89.1 08/30/2021    MCH 30.0 08/30/2021    MCHC 33.7 08/30/2021    RDW 13.6 08/30/2021     08/30/2021     CBC with Differential:    Lab Results   Component Value Date    WBC 8.6 08/30/2021    RBC 4.08 08/30/2021    HGB 14.2 08/30/2021    HCT 36.4 08/30/2021     08/30/2021    MCV 89.1 08/30/2021    MCH 30.0 08/30/2021    MCHC 33.7 08/30/2021    RDW 13.6 08/30/2021    LYMPHOPCT 12.8 08/30/2021    MONOPCT 7.8 08/30/2021    BASOPCT 1.0 08/30/2021    MONOSABS 0.7 08/30/2021    LYMPHSABS 1.1 08/30/2021    EOSABS 0.3 08/30/2021    BASOSABS 0.1 08/30/2021     CMP:    Lab Results   Component Value Date     08/30/2021    K 3.3 08/30/2021    K 3.3 08/30/2021    CL 97 08/30/2021    CO2 29 08/30/2021    BUN 15 08/30/2021    CREATININE 1.0 08/30/2021    CREATININE 0.56 08/30/2021    GFRAA >60 08/30/2021    LABGLOM >60 08/30/2021    GLUCOSE 163 08/30/2021    PROT 6.0 08/30/2021    LABALBU 2.5 08/30/2021    CALCIUM 8.8 08/30/2021    BILITOT 0.4 08/30/2021    ALKPHOS 107 08/30/2021    AST 17 08/30/2021    ALT 23 08/30/2021     BMP:    Lab Results   Component Value Date     08/30/2021    K 3.3 08/30/2021    K 3.3 08/30/2021    CL 97 08/30/2021    CO2 29 08/30/2021    BUN 15 08/30/2021    LABALBU 2.5 08/30/2021    CREATININE 1.0 08/30/2021    CREATININE 0.56 08/30/2021    CALCIUM 8.8 08/30/2021    GFRAA >60 08/30/2021    LABGLOM >60 08/30/2021    GLUCOSE 163 08/30/2021     Magnesium:    Lab Results   Component Value Date    MG 2.0 08/30/2021     Troponin:    Lab Results   Component Value Date    TROPONINI <0.010 08/10/2021        Active Hospital Problems    Diagnosis Date Noted    Coagulase negative Staphylococcus bacteremia [R78.81, B95.7]      Priority: Low    Acute respiratory failure with hypoxia (HCC) [J96.01]      Priority: Low    Pneumonia due to COVID-19 virus [U07.1, J12.82] 08/10/2021     Priority: Low    New onset type 2 diabetes mellitus ([facility] [address] [E11.9]      Priority: Low    Hypoxia [R09.02]      Priority: Low        Assessment/Plan:  1. Respiratory failure- vented. 2. COVID PNA- severe. continued Fever and respiratory failure. 3. Tachybrady- keep on Telemetry. K and Mg WNL  4. HTN - labile- will observe.    5. Echo -p       Electronically signed by Theodore Villalpando MD on 8/30/2021 at 4:50 PM

## 2021-08-30 NOTE — PROGRESS NOTES
Infectious Disease     Patient Name: Fredrick Chavez  Date: 8/30/2021  YOB: 1965  Medical Record Number: [id number]      COVID-19 pneumonia  Staphylococcus bacteremia        Remains intubated 40% FiO2 8 PEEP   91% sat  Tolerating some weaning  Fevers    Chest x-ray shows persistent bilateral alveolar infiltrates somewhat improved from 8/22/2021      Blood culture positive  Culture, Blood 2 [[id number]] (Abnormal) Collected: 08/27/21 1617   Order Status: Completed Specimen: Blood Updated: 08/29/21 0811    Culture, Blood 2 1 out of 2 blood culturesAbnormal     Organism Staphylococcus lugdunensis DNA DetectedAbnormal      Sensitivities pending        Review of Systems   Unable to perform ROS: Intubated        Physical Exam  Constitutional:       Appearance: He is ill-appearing and diaphoretic. Cardiovascular:      Heart sounds: Normal heart sounds. No murmur heard. Pulmonary:      Effort: Pulmonary effort is normal.      Breath sounds: No wheezing, rhonchi or rales. Abdominal:      General: Abdomen is flat. There is no distension. Palpations: There is no mass. Tenderness: There is no abdominal tenderness. There is no guarding. Musculoskeletal:      Cervical back: Neck supple. No rigidity. Right lower leg: No edema. Left lower leg: No edema. Blood pressure (!) 184/74, pulse 92, temperature 100.8 °F (38.2 °C), temperature source Bladder, resp. rate 26, height 6' (1.829 m), weight 242 lb 8.1 oz (110 kg), SpO2 91 %.       .   Lab Results   Component Value Date    WBC 8.6 08/30/2021    HGB 14.2 08/30/2021    HCT 36.4 (L) 08/30/2021    MCV 89.1 08/30/2021     08/30/2021     Lab Results   Component Value Date     08/30/2021    K 3.3 08/30/2021    K 3.3 08/30/2021    CL 97 08/30/2021    CO2 29 08/30/2021    BUN 15 08/30/2021    CREATININE 1.0 08/30/2021    CREATININE 0.56 08/30/2021    GLUCOSE 163 08/30/2021    CALCIUM 8.8 08/30/2021      EXAMINATION: CHEST PORTABLE VIEW       CLINICAL HISTORY: Increasing short of breath. Correlate. Follow-up       COMPARISONS: August 10, 2021       FINDINGS:       Single  views of the chest is submitted.  The cardiac silhouette is enlarged   Pulmonary vascular unremarkable. Right sided trachea. Persistent multifocal to patchy glass infiltrates throughout the lung parenchyma.  No Pneumothoraces.                                                                                        Impression   PERSISTENT PATCHY BILATERAL MULTIFOCAL TO GLASS INFILTRATES THROUGHOUT THE LUNG PARENCHYMA     CULTURE, RESPIRATORY 08/24/2021  5:40 AM  - [facility]   Moderate growth   ID and sensitivity to follow    Organism Abnormal  08/24/2021  5:40 AM  - [facility]   Gram negative sanjana    CULTURE, RESPIRATORY 08/24/2021  5:40 AM MH - [facility]           Acinetobacter baumannii (1)    Antibiotic Interpretation YESSICA Status    ampicillin-sulbactam Sensitive <=2 mcg/mL     cefepime Sensitive 2 mcg/mL     cefTRIAXone Sensitive 8 mcg/mL     ciprofloxacin Sensitive <=0.25 mcg/mL     gentamicin Sensitive <=1 mcg/mL     imipenem Sensitive <=0.25 mcg/mL     tobramycin Sensitive <=1 mcg/mL     Klebsiella aerogenes (2)    Antibiotic Interpretation YESSICA Status    amoxicillin-clavulanate Resistant >=32 mcg/mL     ceFAZolin Resistant >=64 mcg/mL     cefTRIAXone Sensitive <=1 mcg/mL     ciprofloxacin Sensitive <=0.25 mcg/mL     gentamicin Sensitive <=1 mcg/mL     trimethoprim-sulfamethoxazole Sensitive <=20 mcg/mL               PLAN:    COVID-19 pneumonia  PE  Staphylococcus lugdunensis bacteremia  Acinetobacter and Klebsiella from sputum     vancomycin   Empirically on Zosyn

## 2021-08-30 NOTE — PROGRESS NOTES
Comprehensive Nutrition Assessment    Type and Reason for Visit:  Reassess    Nutrition Recommendations/Plan:   Continue with Peptide based high protein TF at 20 ml/hr x 24 hrs via OG. Continue with 3 protein modulars per day with water flush. Decrease water flush to 100 ml every 4 hrs and monitor Na levels    Nutrition Assessment:  Pt continues to meet criteria for moderate malnutrition related to acute illness, unable to advance EN due to proning schedule with COVID-19, had episode of TF intolerance with residuals > 500, noted MD increase free water due to hypernatreamia. May need to consider supplemental PN    Malnutrition Assessment:  Malnutrition Status:   Moderate malnutrition    Context:  Acute Illness     Findings of the 6 clinical characteristics of malnutrition:  Energy Intake:  7 - 50% or less of estimated energy requirements for 5 or more days  Weight Loss:  1 - 5% over 1 month     Body Fat Loss:  Unable to assess     Muscle Mass Loss:  Unable to assess    Fluid Accumulation:  Unable to assess     Strength:  Not Performed    Estimated Daily Nutrient Needs:  Energy (kcal):  7164-6375 kcals @ 11-14 kca;/kg; Weight Used for Energy Requirements:  Current (110 kg)     Protein (g):  ~ 162 g protein @ 2 g/kg IBW; Weight Used for Protein Requirements:  Ideal (81 kg)        Fluid (ml/day):  ~2268 ml @ 28 m/l/kg IBW; Method Used for Fluid Requirements:  ml/Kg (81 kg)      Nutrition Related Findings:  intubated 8/22, day 8 trophic TF due to proning schedule, if proptein modulars are given , protien needs are being met and  current propofol rate delivers addition ~780 kcals per day., Current Na levels wnl, glucose , continues wtih generalized non-pitting edema, last BM 8/25 on bowel regimen      Wounds:  None       Current Nutrition Therapies:    Current Tube Feeding (TF) Orders:  · Feeding Route: Orogastric  · Formula: Peptide Based High Protein  · Schedule: Continuous @ 20 ml/hr  · Additives/Modulars: Protein (x3 = 312 kcals, 78 g protein)  · Water Flushes: 300 ml every 4 hrs ( 1800 )  · Current TF & Flush Orders Provides: 792 kcals ( + propofol), 120 g protein, 2200 ml free water  · Goal TF & Flush Orders Provides: 792 kcals ( + propofol) 120 g protein, ~1000 ml free water with decrease in water flush to 100 ml, 6 x daily    Anthropometric Measures:  · Height: 6' (182.9 cm)  · Current Body Weight:  (no weight since 8/20)   · Admission Body Weight: 254 lb (115.2 kg)    · Usual Body Weight:  (n/n, no EMR wts)     · Ideal Body Weight: 178 lbs;  · BMI: 32.8  · BMI Categories: Obese Class 1 (BMI 30.0-34. 9)       Nutrition Diagnosis:   · Altered nutrition-related lab values related to endocrine dysfuntion as evidenced by lab values    · Inadequate oral intake related to impaired respiratory function as evidenced by NPO or clear liquid status due to medical condition, intubation    · Moderate malnutrition, In context of acute illness or injury related to increase demand for energy/nutrients as evidenced by weight loss greater than or equal to 5% in 1 month, intake 26-50%    Nutrition Interventions:   Food and/or Nutrient Delivery:  Continue Current Tube Feeding (Continue with Peptide based high protein TF at 20 ml/hr x 24 hrs via OG. Continue with 3 protein modulars per day with water flush. Decrease water flush to 100 ml every 4 hrs adn monitor Na levels)  Nutrition Education/Counseling:  Education needed   Coordination of Nutrition Care:  Continue to monitor while inpatient    Goals:  New goals : En to deliver kcals/protien within range of estimated needs, iimproved renal labs/glucose       Nutrition Monitoring and Evaluation:     Food/Nutrient Intake Outcomes:  Enteral Nutrition Intake/Tolerance  Physical Signs/Symptoms Outcomes:  Biochemical Data, Constipation, GI Status, Hemodynamic Status, Weight, Fluid Status or Edema     Discharge Planning:     Too soon to determine Electronically signed by Leslee Barrett RD, LD on 8/30/21 at 3:13 PM EDT

## 2021-08-30 NOTE — FLOWSHEET NOTE
Contacted Dr. Roro Ribeiro regarding patients Q2H water flush of 300mL. Relayed the change in sodium level since initiation of the order. Frequency changed to Q4H.

## 2021-08-30 NOTE — PROGRESS NOTES
Progress Note  Date:2021       Room:[place] [address]  Christopher Curry     YOB: 1965     Age:56 y.o. Chief complaint uncontrolled diabetes    Subjective    Subjective:  Symptoms:  Stable. Diet:  NPO (NG tube feeding). Review of Systems   Unable to perform ROS: Mental status change     Objective         Vitals Last 24 Hours:  TEMPERATURE:  Temp  Av.5 °F (38.1 °C)  Min: 99.3 °F (37.4 °C)  Max: 102 °F (38.9 °C)  RESPIRATIONS RANGE: Resp  Av.8  Min: 15  Max: 32  PULSE OXIMETRY RANGE: SpO2  Av %  Min: 84 %  Max: 98 %  PULSE RANGE: Pulse  Av.7  Min: 39  Max: 118  BLOOD PRESSURE RANGE: No data recorded.  ; No data recorded. I/O (24Hr): Intake/Output Summary (Last 24 hours) at 2021 2348  Last data filed at 2021 1659  Gross per 24 hour   Intake 5135.41 ml   Output 1700 ml   Net 3435.41 ml     Objective:  General Appearance:  Ill-appearing. Vital signs: (most recent): Blood pressure (!) 142/64, pulse 71, temperature 100.9 °F (38.3 °C), temperature source Bladder, resp. rate 30, height 6' (1.829 m), weight 242 lb 8.1 oz (110 kg), SpO2 95 %. Vital signs are normal.      Labs/Imaging/Diagnostics    Labs:  CBC:  Recent Labs     21  0526 21  1103 21  0548 21  1102 21  0423 21  0600 21  0830   WBC 7.9  --  13.3*  --   --  10.0  --    RBC 4.51*  --  4.44*  --   --  3.79*  --    HGB 13.0*   < > 12.8*   < > 11.0* 11.1* 11.2*   HCT 40.4*  --  39.6*  --   --  33.9*  --    MCV 89.6  --  89.0  --   --  89.3  --    RDW 13.6  --  13.5  --   --  13.4  --      --  266  --   --  240  --     < > = values in this interval not displayed.      CHEMISTRIES:  Recent Labs     21  0555 21  1103 21  0549 21  1102 21  0423 21  0600 21  0830   *  --  152*  --   --  143  --    K 4.3  4.3  --  3.5  3.5  --   --  3.2*  3.2*  --    *  --  112*  --   --  104  --    CO2 32*  -- 31  --   --  29  --    BUN 23*  --  20  --   --  17  --    CREATININE 0.85   < > 0.55*   < > 0.9 0.72 1.0   GLUCOSE 160*  --  100*  --   --  96  --    PHOS 3.3  --  2.8  --   --  2.7  --    MG  --   --  2.1  --   --  1.9  --     < > = values in this interval not displayed. PT/INR:No results for input(s): PROTIME, INR in the last 72 hours. APTT:No results for input(s): APTT in the last 72 hours. LIVER PROFILE:  Recent Labs     08/27/21  0555 08/28/21  0549 08/29/21  0600   AST 14 36 21   ALT 15 34 26   BILITOT <0.2 0.4 0.4   ALKPHOS 109* 110* 90       Imaging Last 24 Hours:  XR CHEST PORTABLE    Result Date: 8/29/2021  XR CHEST PORTABLE Clinical History:  Hypoxia. Confirmed COVID-19 (coronavirus) infection. Comparison:  Chest x-ray 8/26/2021. RESULT: Right-sided central venous catheter, grossly unchanged. Endotracheal tube with tip approximately 8 cm above the ej. NG tube with tip coursing out of field of view and side-port near the level of the diaphragm. Patchy opacities throughout the lungs, overall similar to prior. No large pleural effusion. No pneumothorax. Stable borderline enlarged cardiomediastinal silhouette, accentuated by technique. No distinct acute osseous findings. Bridging endplate osteophytes within the visualized spine. No significant interval change from prior. Assessment//Plan           Hospital Problems         Last Modified POA    Pneumonia due to COVID-19 virus 8/17/2021 Yes    New onset type 2 diabetes mellitus ([address] 8/10/2021 Yes    Hypoxia 8/10/2021 Yes    Acute respiratory failure with hypoxia ([address] 8/22/2021 Yes    Coagulase negative Staphylococcus bacteremia 8/29/2021 Yes        Assessment:    Condition: In serious condition. Unchanged. (Uncontrolled type 2 diabetes blood sugars have been lower  Respiratory failure secondary to COVID-19  Coagulase-negative staph bacteremia).      Plan:   (Lower Lantus to 30 units twice a day plus Humalog 4 times daily  Continue Humalog coverage 4 times daily  Continue IV Zosyn and vancomycin  Multiple bruises seen through glass window due to COVID-19 protocol  Total time spent was 15 minutes).        Electronically signed by Cristy Christianson MD on 8/29/21 at 11:48 PM EDT

## 2021-08-30 NOTE — PROGRESS NOTES
Spiritual Care Services     Summary of Visit:  Brief encounter with family in ICU hallway in front of patient's room. Patient's son and other family members were present. Nurse gave a brief update to the family. Per son, patient has been on the vent since 8/10, however, patient has only been in ICU since 8/17 and was intubated on 8/22. Patient's son is grateful to be allowed to see his father. He is in the [employer] and was given special permission to come home at this time. The son stated that the patient would not want to be trached. The son stated also that the patient used to attend [facility] in [place] but has not been to Christianity in a long time. He did not believe the Christianity needed to be contacted at this time. Spiritual Assessment/Intervention/Outcomes:    Encounter Summary  Services provided to[de-identified] Family  Referral/Consult From[de-identified] Rounding  Support System: Children, Family members  Place of Confucianism: [facility]  Contact Advent: No  Continue Visiting: Yes  Complexity of Encounter: Moderate  Length of Encounter: 15 minutes  Spiritual Assessment Completed: Yes  Routine  Type: Follow up  Assessment: Calm, Approachable, Doubtful  Intervention: Explored feelings, thoughts, concerns, Nurtured hope, Sustaining presence/ Ministry of presence, Discussed belief system/Anabaptist practices/margot  Outcome: Expressed gratitude, Engaged in conversation, Receptive, Expressed feelings/needs/concerns                          Values / Beliefs  Do you have any ethnic, cultural, sacramental, or spiritual Anabaptist needs you would like us to be aware of while you are in the hospital?: No    Care Plan:    [facility] to continue to provide supportive presence and prayer for the patient and his family.     Spiritual Care Services   Electronically signed by Steve Norton on 8/30/21 at 10:12 AM EDT     To reach a  for emotional and spiritual support, place an [facility] consult request.   If a  is needed immediately, dial 0 and ask to page the on-call .

## 2021-08-30 NOTE — PROCEDURES
introducer needle was then inserted under direct ultrasound guidance and venous blood was withdrawn into the syringe. The syringe was removed and the guidewire was advanced through the introducer needle. A small incision was made with a scalpel and the introducer needle was removed. A dilator was advanced over the guidewire until appropriate dilation was obtained. The dilator was removed and an central venous  catheter was advanced over the guidewire and secured into place with 2 sutures at 16 cm. At time of procedure completion, all ports aspirated and flushed properly. Estimated Blood loss   less than 5 cc    COMPLICATIONS:   Placement had no complication, chest x-ray shows central line does not cross midline, the brown port was connected to CVP monitor, the wave is venous pressure is 3-5, confirming venous location. However since the line did not cross midline the central line was removed will attempt placing PICC line or femoral central line tomorrow.   We will continue using the previous line            Katie Lwoe MD

## 2021-08-30 NOTE — PROGRESS NOTES
2000, assessment completed (see flowsheets). Patient on mechanical ventilation, levophed, Propofol, precedex, and fentanyl. On cooling blanket due to temperature of 38.6 C. Heart rate becoming bradycardic into the 40's. Titrated off precedex to increase heart rate. 2100 cooling blanket turned off. Patient's temperature 37.5 C    2130 patient's heart rate bradycardic in the low 40's decreasing into the 30's at times. Perfect served Dr Jorge Alberto Steinberg. Advised to titrate down propofol and obtain EKG. 2158 patient's heart rate sustaining in the 30's decreasing into high 20's at times. Informed Dr Jorge Alberto Steinberg. New orders for dopamine given.

## 2021-08-31 NOTE — PROGRESS NOTES
Department of Internal Medicine  General Internal Medicine  Attending Progress Note      SUBJECTIVE:  Pt seen through ICU door to reduce exposure to COVID-19. Pt intubated and in the supine position.  On pressors    OBJECTIVE      Medications    Current Facility-Administered Medications: [COMPLETED] vancomycin (VANCOCIN) 1,750 mg in dextrose 5 % 500 mL IVPB, 1,750 mg, IntraVENous, Once **FOLLOWED BY** vancomycin (VANCOCIN) 1,500 mg in dextrose 5 % 500 mL IVPB, 1,500 mg, IntraVENous, Q12H  lactulose (CHRONULAC) 10 GM/15ML solution 20 g, 20 g, Oral, TID  clonazePAM (KLONOPIN) tablet 0.5 mg, 0.5 mg, Oral, TID  metoclopramide (REGLAN) injection 10 mg, 10 mg, IntraVENous, Q12H  norepinephrine (LEVOPHED) 16 mg in dextrose 5% 250 mL infusion, 2-100 mcg/min, IntraVENous, Continuous  vancomycin (VANCOCIN) intermittent dosing (placeholder), , Other, RX Placeholder  insulin glargine (LANTUS) injection vial 30 Units, 30 Units, SubCUTAneous, BID  dexmedetomidine (PRECEDEX) 400 mcg in sodium chloride 0.9 % 100 mL infusion, 0.2-1.4 mcg/kg/hr, IntraVENous, Continuous  acetaminophen (TYLENOL) tablet 650 mg, 650 mg, Oral, Q4H PRN **OR** acetaminophen (TYLENOL) suppository 650 mg, 650 mg, Rectal, Q4H PRN  insulin lispro (HUMALOG) injection vial 4 Units, 4 Units, SubCUTAneous, 4x Daily  docusate (COLACE) 50 MG/5ML liquid 100 mg, 100 mg, Per NG tube, BID  insulin lispro (HUMALOG) injection vial 0-12 Units, 0-12 Units, SubCUTAneous, Q4H  fentaNYL (SUBLIMAZE) 1,000 mcg in sodium chloride 0.9 % 100 mL infusion, 12.5-200 mcg/hr, IntraVENous, Continuous  piperacillin-tazobactam (ZOSYN) 3,375 mg in dextrose 5 % 50 mL IVPB extended infusion (mini-bag), 3,375 mg, IntraVENous, Q8H  chlorhexidine (PERIDEX) 0.12 % solution 15 mL, 15 mL, Mouth/Throat, BID  pantoprazole (PROTONIX) injection 40 mg, 40 mg, IntraVENous, Daily **AND** sodium chloride (PF) 0.9 % injection 10 mL, 10 mL, IntraVENous, Daily  propofol injection, 5-50 mcg/kg/min, IntraVENous, Titrated  enoxaparin (LOVENOX) injection 105 mg, 1 mg/kg, SubCUTAneous, BID  guaiFENesin-codeine (GUAIFENESIN AC) 100-10 MG/5ML liquid 5 mL, 5 mL, Oral, Q4H PRN  polyethylene glycol (GLYCOLAX) packet 17 g, 17 g, Oral, Daily  sodium chloride flush 0.9 % injection 5-40 mL, 5-40 mL, IntraVENous, 2 times per day  sodium chloride flush 0.9 % injection 5-40 mL, 5-40 mL, IntraVENous, PRN  0.9 % sodium chloride infusion, 25 mL, IntraVENous, PRN  ondansetron (ZOFRAN-ODT) disintegrating tablet 4 mg, 4 mg, Oral, Q8H PRN **OR** ondansetron (ZOFRAN) injection 4 mg, 4 mg, IntraVENous, Q6H PRN  polyethylene glycol (GLYCOLAX) packet 17 g, 17 g, Oral, Daily PRN  potassium chloride 10 mEq/100 mL IVPB (Peripheral Line), 10 mEq, IntraVENous, PRN  magnesium sulfate 2000 mg in 50 mL IVPB premix, 2,000 mg, IntraVENous, PRN  ipratropium-albuterol (DUONEB) nebulizer solution 1 ampule, 1 ampule, Inhalation, Q4H PRN  hydrALAZINE (APRESOLINE) injection 10 mg, 10 mg, IntraVENous, Q6H PRN  glucose (GLUTOSE) 40 % oral gel 15 g, 15 g, Oral, PRN  dextrose 50 % IV solution, 12.5 g, IntraVENous, PRN  glucagon (rDNA) injection 1 mg, 1 mg, IntraMUSCular, PRN  dextrose 5 % solution, 100 mL/hr, IntraVENous, PRN  Physical    VITALS:  BP (!) 146/57   Pulse 82   Temp 100.8 °F (38.2 °C) (Bladder)   Resp 30   Ht 6' (1.829 m)   Wt 250 lb 14.1 oz (113.8 kg)   SpO2 99%   BMI 34.03 kg/m²   Constitutional: intubated and sedated  Head: Normocephalic, atraumatic  Lungs: no tachypnea, on the ventilator  Heart: tachycardic on tele  GI: obese     Data    CBC:   Lab Results   Component Value Date    WBC 7.6 08/31/2021    RBC 3.86 08/31/2021    HGB 13.0 08/31/2021    HCT 34.1 08/31/2021    MCV 88.3 08/31/2021    MCH 29.3 08/31/2021    MCHC 33.2 08/31/2021    RDW 13.2 08/31/2021     08/31/2021     CMP:    Lab Results   Component Value Date     08/31/2021    K 4.1 08/31/2021    K 3.2 08/31/2021     08/31/2021    CO2 32 08/31/2021    BUN 15 08/31/2021    CREATININE 0.8 08/31/2021    CREATININE 0.57 08/31/2021    GFRAA >60 08/31/2021    LABGLOM >60 08/31/2021    GLUCOSE 110 08/31/2021    PROT 6.2 08/31/2021    LABALBU 2.3 08/31/2021    CALCIUM 8.5 08/31/2021    BILITOT 0.3 08/31/2021    ALKPHOS 124 08/31/2021    AST 21 08/31/2021    ALT 27 08/31/2021       ASSESSMENT AND PLAN      # Acute hypoxic respiratory failure 2/2 COVID-19 pneumonia  - was intubated on 8/22  - sedated, paralyzed. Prone as tolerated  - critical care consulted  - completed course of remdesivir, actemra  - continuing decadron, lovenox  - ID consulted  - Resp Cx with acinetobacter and klebsiella  - empiric zosyn and vancomycin  -Discussed plan of care with patient's son    # New onset diabetes  - cont lantus, ISS  - endocrinology consulted    DVT: lovenox per covid protocol    Disposition: Pt continues to be on vent. Critical care managing. Continuing decadron, zosyn. ID consulted. 20-day documentation  79-year-old male admitted with hypoxic respiratory failure secondary to COVID-19 pneumonia, intubated 8/22 status post remdesivir Actemra and day 10 Decadron  Continued to be intubated. Unable to wean. On zosyn for gram negative pneumonia and levophed.  Critical pt. ?trach as patient unable to wean from vent        Struble Self, DO   Internal Medicine

## 2021-08-31 NOTE — PROGRESS NOTES
Pulmonary & Critical Care Medicine ICU Progress Note  Chief complaint : Severe acute hypoxic respiratory failure    Subjunctive/24 hour events :   Patient seen and examined during multidisciplinary rounds with RN, charge nurse, RT, pharmacy, dietitian, and social service. Patient wakes up, follows simple commands, still weak, is currently on 60% FiO2, and 8 of PEEP, saturation 96%, tolerates tube feed, no vomiting, no bowel movement, urine output 1800 cc, T-max 38.2,    Social History     Tobacco Use    Smoking status: Never Smoker    Smokeless tobacco: Never Used   Substance Use Topics    Alcohol use: Never     History reviewed. No pertinent family history. Recent Labs     08/30/21  1119 08/31/21  0503   PHART 7.276* 7.407   [id number] 73* 48*   PO2ART 60* 43*       MV Settings:  Vent Mode: AC/VC Rate Set: 30 bmp/Vt Ordered: 480 mL/ /FiO2 : 60 %           IV:   norepinephrine 4 mcg/min (08/31/21 0736)    dexmedetomidine Stopped (08/29/21 2005)    fentaNYL (SUBLIMAZE) infusion 200 mcg/hr (08/31/21 0434)    propofol 35 mcg/kg/min (08/31/21 0839)    sodium chloride 25 mL (08/30/21 2024)    dextrose         Vitals:  BP (!) 146/57   Pulse 75   Temp 100.8 °F (38.2 °C) (Bladder)   Resp 29   Ht 6' (1.829 m)   Wt 242 lb 8.1 oz (110 kg)   SpO2 96%   BMI 32.89 kg/m²    Tmax:        Intake/Output Summary (Last 24 hours) at 8/31/2021 0848  Last data filed at 8/31/2021 0533  Gross per 24 hour   Intake 1768.56 ml   Output 1800 ml   Net -31.44 ml       EXAM:  General: Sedated, calm, on vent, no distress  Head: normocephalic, atraumatic  Eyes:No gross abnormalities. ENT:  MMM no lesions,  ET tube and OG tube  Neck:  supple and no masses, right-sided IJ triple-lumen catheter  Chest : Good air movement, no wheezing, no rales, nontender, tympanic  Heart[de-identified] Heart sounds are normal.  Regular rate and rhythm without murmur, gallop or rub.   ABD:  symmetric, soft, non-tender, no guarding or rebound  Musculoskeletal : no cyanosis, no clubbing and no edema  Neuro: Sedated, did not follow commands. Skin: No rashes or nodules noted. Lymph node:  no cervical nodes  Urology: Yes Lugo   Psychiatric: Sedated on vent    Medications:  Scheduled Meds:   lactulose  20 g Oral TID    clonazePAM  0.5 mg Oral TID    metoclopramide  10 mg IntraVENous Q12H    vancomycin (VANCOCIN) intermittent dosing (placeholder)   Other RX Placeholder    vancomycin  1,250 mg IntraVENous Q12H    insulin glargine  30 Units SubCUTAneous BID    insulin lispro  4 Units SubCUTAneous 4x Daily    docusate  100 mg Per NG tube BID    insulin lispro  0-12 Units SubCUTAneous Q4H    piperacillin-tazobactam  3,375 mg IntraVENous Q8H    chlorhexidine  15 mL Mouth/Throat BID    pantoprazole  40 mg IntraVENous Daily    And    sodium chloride (PF)  10 mL IntraVENous Daily    enoxaparin  1 mg/kg SubCUTAneous BID    polyethylene glycol  17 g Oral Daily    sodium chloride flush  5-40 mL IntraVENous 2 times per day       PRN Meds:  acetaminophen **OR** acetaminophen, guaiFENesin-codeine, sodium chloride flush, sodium chloride, ondansetron **OR** ondansetron, polyethylene glycol, potassium chloride, magnesium sulfate, ipratropium-albuterol, hydrALAZINE, glucose, dextrose, glucagon (rDNA), dextrose    Results: reviewed by me   CBC:   Recent Labs     08/29/21  0600 08/29/21  0830 08/30/21  0527 08/30/21  0855 08/30/21  1119 08/31/21  0503 08/31/21  0540   WBC 10.0  --  8.6  --   --   --  7.6   HGB 11.1*   < > 12.2*   < > 14.2 11.8* 11.3*   HCT 33.9*  --  36.4*  --   --   --  34.1*   MCV 89.3  --  89.1  --   --   --  88.3     --  243  --   --   --  265    < > = values in this interval not displayed.      BMP:   Recent Labs     08/29/21  0600 08/29/21  0830 08/30/21  0527 08/30/21  0855 08/30/21  1119 08/31/21  0503 08/31/21  0539     --  138  --   --   --  140   K 3.2*  3.2*   < > 3.3*  3.3*  --   --   --  3.2*  3.2*     --  97  --   --   --  100 CO2 29  --  29  --   --   --  32*   PHOS 2.7  --  3.0  --   --   --  2.4   BUN 17  --  15  --   --   --  15   CREATININE 0.72   < > 0.56*   < > 1.0 0.7* 0.57*    < > = values in this interval not displayed. LIVER PROFILE:   Recent Labs     08/29/21  0600 08/30/21  0527 08/31/21  0539   AST 21 17 21   ALT 26 23 27   BILITOT 0.4 0.4 0.3   ALKPHOS 90 107* 124*     PT/INR: No results for input(s): PROTIME, INR in the last 72 hours. APTT: No results for input(s): APTT in the last 72 hours. UA:No results for input(s): NITRITE, COLORU, PHUR, LABCAST, WBCUA, RBCUA, MUCUS, TRICHOMONAS, YEAST, BACTERIA, CLARITYU, SPECGRAV, LEUKOCYTESUR, UROBILINOGEN, BILIRUBINUR, BLOODU, GLUCOSEU, AMORPHOUS in the last 72 hours. Invalid input(s): KETONESU    Cultures:  Sputum culture grows Klebsiella aerogenes   Blood culture grew Staphylococcus lugdunensis      Films:  Chest x-ray today, ET tube in good position, evolving right lower lobe infiltrate and congestion  Assessment:   This is a critically ill patient at risk of deterioration / death , needing close ICU monitoring and intervention due to below noted problems     · Severe acute hypoxic respiratory failure  · Severe COVID-19 infection   · Pulmonary emboli due to COVID-19  · Bacterial coinfection, Klebsiella  · Staphylococcus lugdunensis septicemia  · Shock physiology resolved, currently not on Levophed   · Hyperglycemia  · Hypercoagulable state    Recommendations  · Vent support lung protective strategy  · head of the bed 30°  · Breathing trial later today  · Sedation with combination propofol and fentanyl target R ASS of 0 to -1  · Watch for ICU delirium: TV on, natural light, avoid benzos, pain control, early mobility, and family engagement  · PUD prophylaxis  · Continue Lovenox therapeutic dose  · Failed breathing trial today  · Patient completed 10 days of dexamethasone  · resp cultures  · Antibiotic per ID  · Completed remdesivir treatment   · Target blood sugar 140-180,   · Appreciate endocrinology  · Monitor urine output and renal function  · DVT prophylaxis  · meatneb TID   · Replace electrolytes    · Levophed on standby use if needed  · New A-line placed yesterday, yesterday central line was short, will place new central line today  · Check ABG             Due to the immediate potential for life-threatening deterioration due to acute hypoxic respiratory failure, I spent 39   minutes providing critical care. This time is excluding time spent performing procedures.             Electronically signed by Kera Hernandez MD,  [facility] ,on 8/31/2021 at 8:48 AM

## 2021-08-31 NOTE — PROGRESS NOTES
[facility]. VSS MP_SR propofol Fentanyl levo infusing without issues. Open eyes to command. Not able to squeeze hands. Parish  09:20am Critical care rounds with Dr Raul Stinson and team  1000am Dr Cora Cruz in and updated on status  10:50am Repositioned to prone  12 noon Dr Adriana Tolentino in and updated on status  1300pm Dr Will Young in and spoke with family  1700pm Remains in prone position. Inc of liquid brown stool x5.   2000pm Remains in prone position. Levo propofol fentanyl infusing without difficulty.  Parish

## 2021-08-31 NOTE — PROGRESS NOTES
Shift summary       Patient had uneventful night. Remains on vent. TF at 20 with 100 water flush every 4 hours. Lugo to cd. Soft restraints times 2 maintained to wrists. Complete bed bath and linen change. Patient has left radial art line and right jug. cvc line. See flow sheets for assessments and MAR for meds.

## 2021-08-31 NOTE — PROGRESS NOTES
PORTABLE VIEW       CLINICAL HISTORY: Increasing short of breath. Correlate. Follow-up       COMPARISONS: August 10, 2021       FINDINGS:       Single  views of the chest is submitted.  The cardiac silhouette is enlarged   Pulmonary vascular unremarkable. Right sided trachea. Persistent multifocal to patchy glass infiltrates throughout the lung parenchyma.  No Pneumothoraces.                                                                                        Impression   PERSISTENT PATCHY BILATERAL MULTIFOCAL TO GLASS INFILTRATES THROUGHOUT THE LUNG PARENCHYMA     CULTURE, RESPIRATORY 08/24/2021  5:40 AM  - [facility]   Moderate growth   ID and sensitivity to follow    Organism Abnormal  08/24/2021  5:40 AM MH - [facility]   Gram negative sanjana    CULTURE, RESPIRATORY 08/24/2021  5:40 AM MH - [facility]           Acinetobacter baumannii (1)    Antibiotic Interpretation YESSICA Status    ampicillin-sulbactam Sensitive <=2 mcg/mL     cefepime Sensitive 2 mcg/mL     cefTRIAXone Sensitive 8 mcg/mL     ciprofloxacin Sensitive <=0.25 mcg/mL     gentamicin Sensitive <=1 mcg/mL     imipenem Sensitive <=0.25 mcg/mL     tobramycin Sensitive <=1 mcg/mL     Klebsiella aerogenes (2)    Antibiotic Interpretation YESSICA Status    amoxicillin-clavulanate Resistant >=32 mcg/mL     ceFAZolin Resistant >=64 mcg/mL     cefTRIAXone Sensitive <=1 mcg/mL     ciprofloxacin Sensitive <=0.25 mcg/mL     gentamicin Sensitive <=1 mcg/mL     trimethoprim-sulfamethoxazole Sensitive <=20 mcg/mL               PLAN:    COVID-19 pneumonia  PE  Staphylococcus lugdunensis bacteremia  Acinetobacter and Klebsiella from sputum     vancomycin   Empirically on Zosyn

## 2021-08-31 NOTE — PROGRESS NOTES
--    CO2 31  --  29  --  29  --   --    BUN 20  --  17  --  15  --   --    CREATININE 0.55*   < > 0.72   < > 0.56* 0.6* 1.0   GLUCOSE 100*  --  96  --  163*  --   --    PHOS 2.8  --  2.7  --  3.0  --   --    MG 2.1  --  1.9  --  2.0  --   --     < > = values in this interval not displayed. PT/INR:No results for input(s): PROTIME, INR in the last 72 hours. APTT:No results for input(s): APTT in the last 72 hours. LIVER PROFILE:  Recent Labs     08/28/21  0549 08/29/21  0600 08/30/21  0527   AST 36 21 17   ALT 34 26 23   BILITOT 0.4 0.4 0.4   ALKPHOS 110* 90 107*       Imaging Last 24 Hours:  XR CHEST PORTABLE    Result Date: 8/30/2021  Exam: XR CHEST PORTABLE History: Line placement. Hypoxia. Technique: AP portable view of the chest obtained. Comparison: Portable chest radiograph August 29, 2021 Findings: Endotracheal tube and enteric tube remain. Right internal jugular catheter remains. Interval placement of a left internal jugular catheter, with its tip projecting over the expected location of the left brachiocephalic vein. The cardiomediastinal silhouette is within normal limits. Patchy bilateral airspace opacities again identified, appearing mildly progressed within the lung bases. No pneumothorax or pleural effusion. No acute osseous abnormality. Mild interval progression of bilateral pneumonia. XR CHEST PORTABLE    Result Date: 8/29/2021  XR CHEST PORTABLE Clinical History:  Hypoxia. Confirmed COVID-19 (coronavirus) infection. Comparison:  Chest x-ray 8/26/2021. RESULT: Right-sided central venous catheter, grossly unchanged. Endotracheal tube with tip approximately 8 cm above the ej. NG tube with tip coursing out of field of view and side-port near the level of the diaphragm. Patchy opacities throughout the lungs, overall similar to prior. No large pleural effusion. No pneumothorax. Stable borderline enlarged cardiomediastinal silhouette, accentuated by technique.  No distinct acute osseous findings. Bridging endplate osteophytes within the visualized spine. No significant interval change from prior. Assessment//Plan           Hospital Problems         Last Modified POA    Pneumonia due to COVID-19 virus 8/17/2021 Yes    New onset type 2 diabetes mellitus ([address] 8/10/2021 Yes    Hypoxia 8/10/2021 Yes    Acute respiratory failure with hypoxia ([address] 8/22/2021 Yes    Coagulase negative Staphylococcus bacteremia 8/29/2021 Yes        Assessment:    Condition: In serious condition. Unchanged. (Uncontrolled type 2 diabetes new onset type 2 diabetes  Hypoxia on ventilator  COVID-19 pneumonia). Plan:   (Continue Lantus 30 units twice a day plus Humalog 4units 4 times a day  Continue IV vancomycin and Zosyn  Patient seen through glass window due to COVID-19 protocol  Time spent 15 minutes).        Electronically signed by Lisbet Pierce MD on 8/30/21 at 10:25 PM EDT

## 2021-08-31 NOTE — PROGRESS NOTES
Pharmacy Vancomycin Consult     Vancomycin Day: 3  Current Dosing: Vancomycin 1250 mg IV q12 hours    Recent Labs     08/30/21  0527 08/30/21  0855 08/31/21  0503 08/31/21  0539 08/31/21  0540   BUN 15  --   --  15  --    CREATININE 0.56*   < > 0.7* 0.57*  --    WBC 8.6  --   --   --  7.6    < > = values in this interval not displayed. Intake/Output Summary (Last 24 hours) at 8/31/2021 1042  Last data filed at 8/31/2021 0533  Gross per 24 hour   Intake 1768.56 ml   Output 1800 ml   Net -31.44 ml       Ht Readings from Last 1 Encounters:   08/10/21 6' (1.829 m)        Wt Readings from Last 1 Encounters:   08/20/21 242 lb 8.1 oz (110 kg)       Body mass index is 32.89 kg/m². Estimated Creatinine Clearance: 183 mL/min (A) (based on SCr of 0.57 mg/dL (L)). Random:  8.6    Assessment/Plan:  Updated [employer]. Random level is subtherapeutic at this time. Please increase Vanco to Vancomycin 1500 mg IV q 12 hours at this time. Per Julius Jimenez, predicted levels to be at steady state are  and trough at 12.7. Level to be drawn with Thursday am labs. Timing of future trough levels may be adjusted based on culture results, renal function, and clinical response.     [facility]  Staff Pharmacist, eDvin Box  8/31/2021  10:48 AM

## 2021-08-31 NOTE — PROGRESS NOTES
Subjective/HPI Pt remains vented and heavily sedated. Reducing slight amount of sedation cause tachycardia and agitation. + Fever. Levo off now off    EKG:   -110 currently        Review of Systems:   Review of Systems  Vented. Physical Examination:    BP (!) 146/57   Pulse 86   Temp 100.8 °F (38.2 °C) (Bladder)   Resp 30   Ht 6' (1.829 m)   Wt 242 lb 8.1 oz (110 kg)   SpO2 96%   BMI 32.89 kg/m²    Physical Exam   Constitutional: He appears healthy. No distress. HENT:   Normal cephalic and Atraumatic   Eyes: Pupils are equal, round, and reactive to light. Neck: Thyroid normal. No JVD present. No neck adenopathy. No thyromegaly present. Cardiovascular: Regular rhythm, normal heart sounds, intact distal pulses and normal pulses. Tachycardia present. Pulmonary/Chest: Effort normal and breath sounds normal. He has no wheezes. He has no rales. He exhibits no tenderness. Abdominal: Soft. Bowel sounds are normal. There is no abdominal tenderness. Musculoskeletal:         General: No tenderness or edema. Normal range of motion. Cervical back: Normal range of motion and neck supple. Neurological: He is alert and oriented to person, place, and time. Skin: Skin is warm. No cyanosis. Nails show no clubbing.        LABS:  CBC:   Lab Results   Component Value Date    WBC 7.6 08/31/2021    RBC 3.86 08/31/2021    HGB 11.3 08/31/2021    HCT 34.1 08/31/2021    MCV 88.3 08/31/2021    MCH 29.3 08/31/2021    MCHC 33.2 08/31/2021    RDW 13.2 08/31/2021     08/31/2021     CBC with Differential:    Lab Results   Component Value Date    WBC 7.6 08/31/2021    RBC 3.86 08/31/2021    HGB 11.3 08/31/2021    HCT 34.1 08/31/2021     08/31/2021    MCV 88.3 08/31/2021    MCH 29.3 08/31/2021    MCHC 33.2 08/31/2021    RDW 13.2 08/31/2021    LYMPHOPCT 12.8 08/31/2021    MONOPCT 9.1 08/31/2021    BASOPCT 0.5 08/31/2021    MONOSABS 0.7 08/31/2021    LYMPHSABS 1.0 08/31/2021    EOSABS 0.3 08/31/2021    BASOSABS 0.0 08/31/2021     CMP:    Lab Results   Component Value Date     08/31/2021    K 3.2 08/31/2021    K 3.2 08/31/2021     08/31/2021    CO2 32 08/31/2021    BUN 15 08/31/2021    CREATININE 0.57 08/31/2021    CREATININE 0.7 08/31/2021    GFRAA >60.0 08/31/2021    LABGLOM >60.0 08/31/2021    GLUCOSE 110 08/31/2021    PROT 6.2 08/31/2021    LABALBU 2.3 08/31/2021    CALCIUM 8.5 08/31/2021    BILITOT 0.3 08/31/2021    ALKPHOS 124 08/31/2021    AST 21 08/31/2021    ALT 27 08/31/2021     BMP:    Lab Results   Component Value Date     08/31/2021    K 3.2 08/31/2021    K 3.2 08/31/2021     08/31/2021    CO2 32 08/31/2021    BUN 15 08/31/2021    LABALBU 2.3 08/31/2021    CREATININE 0.57 08/31/2021    CREATININE 0.7 08/31/2021    CALCIUM 8.5 08/31/2021    GFRAA >60.0 08/31/2021    LABGLOM >60.0 08/31/2021    GLUCOSE 110 08/31/2021     Magnesium:    Lab Results   Component Value Date    MG 2.1 08/31/2021     Troponin:    Lab Results   Component Value Date    TROPONINI <0.010 08/10/2021        Active Hospital Problems    Diagnosis Date Noted    Coagulase negative Staphylococcus bacteremia [R78.81, B95.7]      Priority: Low    Acute respiratory failure with hypoxia (HCC) [J96.01]      Priority: Low    Pneumonia due to COVID-19 virus [U07.1, J12.82] 08/10/2021     Priority: Low    New onset type 2 diabetes mellitus ([facility] [E11.9]      Priority: Low    Hypoxia [R09.02]      Priority: Low        Assessment/Plan:  1. Respiratory failure- vented. On FIO2 60%. Turn pt to Prone today. 2. Recurrent Fever  3. COVID PNA- severe. continued Fever and respiratory failure. 4. New Pulmonary Embolism felt to be related to COVID. - full dose Lovenox. Will change to oral A/C later  5. Bradycardia resolved  6. Tachybrady- keep on Telemetry. K and Mg WNL  7.  HTN - labile- stable this am.   8. Echo -p       Electronically signed by Karen Yu MD on 8/31/2021 at 10:41 AM

## 2021-09-01 NOTE — PROGRESS NOTES
rub.  ABD:  symmetric, soft, non-tender, no guarding or rebound  Musculoskeletal : no cyanosis, no clubbing and no edema  Neuro: Sedated, did not follow commands. Skin: No rashes or nodules noted. Lymph node:  no cervical nodes  Urology: Yes Lugo   Psychiatric: Sedated on vent    Medications:  Scheduled Meds:   vancomycin  1,500 mg IntraVENous Q12H    lactulose  20 g Oral TID    clonazePAM  0.5 mg Oral TID    metoclopramide  10 mg IntraVENous Q12H    vancomycin (VANCOCIN) intermittent dosing (placeholder)   Other RX Placeholder    insulin glargine  30 Units SubCUTAneous BID    insulin lispro  4 Units SubCUTAneous 4x Daily    docusate  100 mg Per NG tube BID    insulin lispro  0-12 Units SubCUTAneous Q4H    piperacillin-tazobactam  3,375 mg IntraVENous Q8H    chlorhexidine  15 mL Mouth/Throat BID    pantoprazole  40 mg IntraVENous Daily    And    sodium chloride (PF)  10 mL IntraVENous Daily    enoxaparin  1 mg/kg SubCUTAneous BID    polyethylene glycol  17 g Oral Daily    sodium chloride flush  5-40 mL IntraVENous 2 times per day       PRN Meds:  acetaminophen **OR** acetaminophen, guaiFENesin-codeine, sodium chloride flush, sodium chloride, ondansetron **OR** ondansetron, polyethylene glycol, potassium chloride, magnesium sulfate, ipratropium-albuterol, hydrALAZINE, glucose, dextrose, glucagon (rDNA), dextrose    Results: reviewed by me   CBC:   Recent Labs     08/30/21  0527 08/30/21  0855 08/31/21  0540 08/31/21  1041 09/01/21  0600   WBC 8.6  --  7.6  --  7.5   HGB 12.2*   < > 11.3* 13.0* 11.1*   HCT 36.4*  --  34.1*  --  32.8*   MCV 89.1  --  88.3  --  88.8     --  265  --  274    < > = values in this interval not displayed.      BMP:   Recent Labs     08/30/21  0527 08/30/21  0855 08/31/21  0539 08/31/21  0539 08/31/21  1041 08/31/21  1557 09/01/21  0600     --  140  --   --   --  139   K 3.3*  3.3*   < > 3.2*  3.2*   < >  --  4.1 3.0*  3.0*   CL 97  --  100  --   --   --  97 CO2 29  --  32*  --   --   --  33*   PHOS 3.0  --  2.4  --   --   --  2.9   BUN 15  --  15  --   --   --  13   CREATININE 0.56*   < > 0.57*  --  0.8*  --  0.59*    < > = values in this interval not displayed. LIVER PROFILE:   Recent Labs     08/30/21  0527 08/31/21  0539 09/01/21  0600   AST 17 21 20   ALT 23 27 24   BILITOT 0.4 0.3 0.3   ALKPHOS 107* 124* 111*     PT/INR: No results for input(s): PROTIME, INR in the last 72 hours. APTT: No results for input(s): APTT in the last 72 hours. UA:No results for input(s): NITRITE, COLORU, PHUR, LABCAST, WBCUA, RBCUA, MUCUS, TRICHOMONAS, YEAST, BACTERIA, CLARITYU, SPECGRAV, LEUKOCYTESUR, UROBILINOGEN, BILIRUBINUR, BLOODU, GLUCOSEU, AMORPHOUS in the last 72 hours. Invalid input(s): KETONESU    Cultures:  Sputum culture grows Klebsiella aerogenes   Blood culture grew Staphylococcus lugdunensis      Films:  Chest x-ray today, congested, and bilateral groundglass infiltrative changes. Assessment:   This is a critically ill patient at risk of deterioration / death , needing close ICU monitoring and intervention due to below noted problems     · Severe acute hypoxic respiratory failure  · Severe COVID-19 infection   · Pulmonary emboli due to COVID-19  · Bacterial coinfection, Klebsiella  · Staphylococcus lugdunensis septicemia  · Shock physiology resolved, currently not on Levophed   · Hyperglycemia  · Hypercoagulable state    Recommendations  · Vent support lung protective strategy  · head of the bed 30°  · Breathing trial later today  · Sedation with combination propofol and fentanyl target R ASS of 0 to -1  · Watch for ICU delirium: TV on, natural light, avoid benzos, pain control, early mobility, and family engagement  · PUD prophylaxis  · Continue Lovenox therapeutic dose  · Failed breathing trial today  · Patient completed 10 days of dexamethasone  · resp cultures  · Antibiotic per ID  · Completed remdesivir treatment   · Target blood sugar 140-180, · Appreciate endocrinology  · Monitor urine output and renal function  · DVT prophylaxis  · meatneb TID   · Replace electrolytes    · Levophed to maintain mean arterial pressure 65-70  · Lasix 20 mg IV x1             Due to the immediate potential for life-threatening deterioration due to acute hypoxic respiratory failure, I spent 40  minutes providing critical care. This time is excluding time spent performing procedures.             Electronically signed by Lucille Reyes MD,  [facility] ,on 9/1/2021 at 8:49 AM

## 2021-09-01 NOTE — PROGRESS NOTES
Department of Internal Medicine  General Internal Medicine  Attending Progress Note      SUBJECTIVE:  Pt seen through ICU door to reduce exposure to COVID-19. Pt intubated.  On pressors    OBJECTIVE      Medications    Current Facility-Administered Medications: potassium chloride 20 mEq/50 mL IVPB (Central Line), 20 mEq, IntraVENous, PRN  furosemide (LASIX) injection 20 mg, 20 mg, IntraVENous, Once  mupirocin (BACTROBAN) 2 % ointment, , Topical, TID  [COMPLETED] vancomycin (VANCOCIN) 1,750 mg in dextrose 5 % 500 mL IVPB, 1,750 mg, IntraVENous, Once **FOLLOWED BY** vancomycin (VANCOCIN) 1,500 mg in dextrose 5 % 500 mL IVPB, 1,500 mg, IntraVENous, Q12H  lactulose (CHRONULAC) 10 GM/15ML solution 20 g, 20 g, Oral, TID  clonazePAM (KLONOPIN) tablet 0.5 mg, 0.5 mg, Oral, TID  metoclopramide (REGLAN) injection 10 mg, 10 mg, IntraVENous, Q12H  norepinephrine (LEVOPHED) 16 mg in dextrose 5% 250 mL infusion, 2-100 mcg/min, IntraVENous, Continuous  vancomycin (VANCOCIN) intermittent dosing (placeholder), , Other, RX Placeholder  insulin glargine (LANTUS) injection vial 30 Units, 30 Units, SubCUTAneous, BID  dexmedetomidine (PRECEDEX) 400 mcg in sodium chloride 0.9 % 100 mL infusion, 0.2-1.4 mcg/kg/hr, IntraVENous, Continuous  acetaminophen (TYLENOL) tablet 650 mg, 650 mg, Oral, Q4H PRN **OR** acetaminophen (TYLENOL) suppository 650 mg, 650 mg, Rectal, Q4H PRN  insulin lispro (HUMALOG) injection vial 4 Units, 4 Units, SubCUTAneous, 4x Daily  docusate (COLACE) 50 MG/5ML liquid 100 mg, 100 mg, Per NG tube, BID  insulin lispro (HUMALOG) injection vial 0-12 Units, 0-12 Units, SubCUTAneous, Q4H  fentaNYL (SUBLIMAZE) 1,000 mcg in sodium chloride 0.9 % 100 mL infusion, 12.5-200 mcg/hr, IntraVENous, Continuous  piperacillin-tazobactam (ZOSYN) 3,375 mg in dextrose 5 % 50 mL IVPB extended infusion (mini-bag), 3,375 mg, IntraVENous, Q8H  chlorhexidine (PERIDEX) 0.12 % solution 15 mL, 15 mL, Mouth/Throat, BID  pantoprazole (PROTONIX) injection 40 mg, 40 mg, IntraVENous, Daily **AND** sodium chloride (PF) 0.9 % injection 10 mL, 10 mL, IntraVENous, Daily  propofol injection, 5-50 mcg/kg/min, IntraVENous, Titrated  enoxaparin (LOVENOX) injection 105 mg, 1 mg/kg, SubCUTAneous, BID  guaiFENesin-codeine (GUAIFENESIN AC) 100-10 MG/5ML liquid 5 mL, 5 mL, Oral, Q4H PRN  polyethylene glycol (GLYCOLAX) packet 17 g, 17 g, Oral, Daily  sodium chloride flush 0.9 % injection 5-40 mL, 5-40 mL, IntraVENous, 2 times per day  sodium chloride flush 0.9 % injection 5-40 mL, 5-40 mL, IntraVENous, PRN  0.9 % sodium chloride infusion, 25 mL, IntraVENous, PRN  ondansetron (ZOFRAN-ODT) disintegrating tablet 4 mg, 4 mg, Oral, Q8H PRN **OR** ondansetron (ZOFRAN) injection 4 mg, 4 mg, IntraVENous, Q6H PRN  polyethylene glycol (GLYCOLAX) packet 17 g, 17 g, Oral, Daily PRN  magnesium sulfate 2000 mg in 50 mL IVPB premix, 2,000 mg, IntraVENous, PRN  ipratropium-albuterol (DUONEB) nebulizer solution 1 ampule, 1 ampule, Inhalation, Q4H PRN  hydrALAZINE (APRESOLINE) injection 10 mg, 10 mg, IntraVENous, Q6H PRN  glucose (GLUTOSE) 40 % oral gel 15 g, 15 g, Oral, PRN  dextrose 50 % IV solution, 12.5 g, IntraVENous, PRN  glucagon (rDNA) injection 1 mg, 1 mg, IntraMUSCular, PRN  dextrose 5 % solution, 100 mL/hr, IntraVENous, PRN  Physical    VITALS:  BP (!) 107/56   Pulse 91   Temp 99.7 °F (37.6 °C) (Bladder)   Resp 30   Ht 6' (1.829 m)   Wt 247 lb 5.7 oz (112.2 kg)   SpO2 98%   BMI 33.55 kg/m²   Constitutional: intubated and sedated  Head: Normocephalic, atraumatic  Lungs: no tachypnea, on the ventilator  Heart: tachycardic on tele  GI: obese     Data    CBC:   Lab Results   Component Value Date    WBC 7.5 09/01/2021    RBC 3.69 09/01/2021    HGB 11.7 09/01/2021    HCT 32.8 09/01/2021    MCV 88.8 09/01/2021    MCH 30.1 09/01/2021    MCHC 33.9 09/01/2021    RDW 13.2 09/01/2021     09/01/2021     CMP:    Lab Results   Component Value Date     09/01/2021    K 3.0 09/01/2021    K 3.0 09/01/2021    CL 97 09/01/2021    CO2 33 09/01/2021    BUN 13 09/01/2021    CREATININE 0.8 09/01/2021    CREATININE 0.59 09/01/2021    GFRAA >60 09/01/2021    LABGLOM >60 09/01/2021    GLUCOSE 134 09/01/2021    PROT 6.1 09/01/2021    LABALBU 2.3 09/01/2021    CALCIUM 8.3 09/01/2021    BILITOT 0.3 09/01/2021    ALKPHOS 111 09/01/2021    AST 20 09/01/2021    ALT 24 09/01/2021       ASSESSMENT AND PLAN      # Acute hypoxic respiratory failure 2/2 COVID-19 pneumonia  - was intubated on 8/22  - sedated, paralyzed. Prone as tolerated  - critical care consulted  - completed course of remdesivir, actemra  - continuing decadron, lovenox  - ID consulted  - Resp Cx with acinetobacter and klebsiella  - empiric zosyn and vancomycin  -Discussed plan of care with patient's son 8/31    # New onset diabetes  - cont lantus, ISS  - endocrinology consulted    DVT: lovenox per covid protocol    Disposition: Pt continues to be on vent. Critical care managing. Continuing decadron, zosyn. ID consulted. 20-day documentation  50-year-old male admitted with hypoxic respiratory failure secondary to COVID-19 pneumonia, intubated 8/22 status post remdesivir Actemra and day 10 Decadron  Continued to be intubated. Unable to wean. On zosyn for gram negative pneumonia and levophed.  Critical pt. ?trach as patient unable to wean from vent        Mace Meuse, DO   Internal Medicine

## 2021-09-01 NOTE — PROGRESS NOTES
--  138  --   --   --  140  --   --    K 3.2*  3.2*   < > 3.3*  3.3*   < >  --   --  3.2*  3.2*  --  4.1     --  97  --   --   --  100  --   --    CO2 29  --  29  --   --   --  32*  --   --    BUN 17  --  15  --   --   --  15  --   --    CREATININE 0.72   < > 0.56*  --    < > 0.7* 0.57* 0.8*  --    GLUCOSE 96  --  163*  --   --   --  110*  --   --    PHOS 2.7  --  3.0  --   --   --  2.4  --   --    MG 1.9  --  2.0  --   --   --  2.1  --   --     < > = values in this interval not displayed. PT/INR:No results for input(s): PROTIME, INR in the last 72 hours. APTT:No results for input(s): APTT in the last 72 hours. LIVER PROFILE:  Recent Labs     08/29/21  0600 08/30/21  0527 08/31/21  0539   AST 21 17 21   ALT 26 23 27   BILITOT 0.4 0.4 0.3   ALKPHOS 90 107* 124*       Imaging Last 24 Hours:  Echocardiogram complete 2D with doppler with color    Result Date: 8/31/2021  Transthoracic Echocardiography Report (TTE)  Demographics   Patient Name    Love Tabor Gender                Male   Patient Number  [id number]      Race                                                   Ethnicity   Visit Number    [id number]     Room Number           IC11   Corporate ID                  Date of Study         08/31/2021   Accession       [id number]    Referring Physician   Anastasiya Ramos MD  Number   Date of Birth   1965    Sonographer           Lauren Whiting   Age             61 year(s)    Interpreting          [facility])                                Physician             Cardiology                                                      Emily Villalobos  Procedure Type of Study   TTE procedure:ECHO COMPLETE 2D W/DOP W/COLOR. Procedure Date Date: 08/31/2021 Start: 10:27 AM Study Location: Portable Technical Quality: Adequate visualization Indications:LVF.  Patient Status: Routine Height: 69 inches Weight: 252 pounds BSA: 2.28 m^2 BMI: 37.21 kg/m^2 BP: 191/71 mmHg  Conclusions   Summary  Normal tricuspid valve structure and function. There is mild ( 1 +) tricuspid regurgitation with estimated RVSP of 51 mm  Hg. Mildly dilated left atrium. Left ventricular ejection fraction is visually estimated at 65%. Mildly enlarged right ventricle cavity. Signature   ----------------------------------------------------------------  Electronically signed by Milagros Meenzes(Interpreting physician)  on 08/31/2021 12:38 PM  ----------------------------------------------------------------   Findings  Left Ventricle Left ventricular ejection fraction is visually estimated at 65%. Right Ventricle Mildly enlarged right ventricle cavity. Left Atrium Mildly dilated left atrium. Right Atrium Normal right atrium. Mitral Valve Normal mitral valve structure and function. Tricuspid Valve Normal tricuspid valve structure and function. There is mild ( 1 +) tricuspid regurgitation with estimated RVSP of 51 mm Hg. Aortic Valve Normal aortic valve structure and function. Pulmonic Valve Normal pulmonic valve structure and function. Pericardial Effusion No evidence of pericardial effusion. Pleural Effusion No evidence of pleural effusion. Aorta \ Miscellaneous Miscellaneous normal findings were found. Doppler Measurements:   TR Velocity:3.21 m/s  TR Gradient:41.11 mmHg                                     Estimated RAP:10 mmHg                                     RVSP:51.11 mmHg  Valves  Tricuspid Valve   Estimated RVSP: 51.11 mmHg              Estimated RAP: 10 mmHg  TR Velocity: 3.21 m/s                   TR Gradient: 41.11 mmHg   Pulmonic Valve            Estimated PASP: 51.11 mmHg  Structures  Right Atrium   RA Systolic Pressure: 10 mmHg   Right Ventricle            RV Systolic Pressure: 83.03 mmHg      XR CHEST PORTABLE    Result Date: 8/30/2021  Exam: XR CHEST PORTABLE History: Line placement. Hypoxia. Technique: AP portable view of the chest obtained.  Comparison: Portable chest radiograph August 29, 2021 Findings: Endotracheal tube and enteric tube

## 2021-09-01 NOTE — PROGRESS NOTES
Progress Note  Patient: Wendy Love  Unit/Bed: IC11/IC11-01  YOB: 1965  MRN: [id number]  Acct: [de-identified]   Admitting Diagnosis: Hypoxia [R09.02]  Acute respiratory failure with hypoxia ([facility] [J96.01]  COVID-19 [U07.1]  Admit Date:  8/10/2021  Hospital Day: 22    Chief Complaint: Bradycardia COVID    Histories:  Past Medical History:   Diagnosis Date    New onset type 2 diabetes mellitus ([facility]      Past Surgical History:   Procedure Laterality Date    SCAPULA SURGERY Left      History reviewed. No pertinent family history. Social History     Socioeconomic History    Marital status: Unknown     Spouse name: None    Number of children: None    Years of education: None    Highest education level: None   Occupational History    None   Tobacco Use    Smoking status: Never Smoker    Smokeless tobacco: Never Used   Vaping Use    Vaping Use: Never used   Substance and Sexual Activity    Alcohol use: Never    Drug use: Never    Sexual activity: Never   Other Topics Concern    None   Social History Narrative    None     Social Determinants of Health     Financial Resource Strain:     Difficulty of Paying Living Expenses:    Food Insecurity:     Worried About Running Out of Food in the Last Year:     Ran Out of Food in the Last Year:    Transportation Needs:     Lack of Transportation (Medical):      Lack of Transportation (Non-Medical):    Physical Activity:     Days of Exercise per Week:     Minutes of Exercise per Session:    Stress:     Feeling of Stress :    Social Connections:     Frequency of Communication with Friends and Family:     Frequency of Social Gatherings with Friends and Family:     Attends [employer] Services:     Active Member of Clubs or Organizations:     Attends Club or Organization Meetings:     Marital Status:    Intimate Partner Violence:     Fear of Current or Ex-Partner:     Emotionally Abused:     Physically Abused:     Sexually Abused: Subjective/HPI Pt remains vented and heavily sedated. 75% FIO2. Reducing slight amount of sedation cause tachycardia and agitation. No Fever. Levo off now off. TF via OG. U/O 700cc    EKG:   ST 92        Review of Systems:   Review of Systems  Vented. Physical Examination:    BP (!) 107/56   Pulse 80   Temp 99.7 °F (37.6 °C) (Bladder)   Resp 30   Ht 6' (1.829 m)   Wt 247 lb 5.7 oz (112.2 kg)   SpO2 99%   BMI 33.55 kg/m²    Physical Exam   Constitutional: He appears healthy. No distress. HENT:   Normal cephalic and Atraumatic   Eyes: Pupils are equal, round, and reactive to light. Neck: Thyroid normal. No JVD present. No neck adenopathy. No thyromegaly present. Cardiovascular: Regular rhythm, normal heart sounds, intact distal pulses and normal pulses. Tachycardia present. Pulmonary/Chest: Effort normal and breath sounds normal. He has no wheezes. He has no rales. He exhibits no tenderness. Abdominal: Soft. Bowel sounds are normal. There is no abdominal tenderness. Musculoskeletal:         General: No tenderness or edema. Normal range of motion. Cervical back: Normal range of motion and neck supple. Neurological: He is alert and oriented to person, place, and time. Skin: Skin is warm. No cyanosis. Nails show no clubbing.        LABS:  CBC:   Lab Results   Component Value Date    WBC 7.5 09/01/2021    RBC 3.69 09/01/2021    HGB 11.7 09/01/2021    HCT 32.8 09/01/2021    MCV 88.8 09/01/2021    MCH 30.1 09/01/2021    MCHC 33.9 09/01/2021    RDW 13.2 09/01/2021     09/01/2021     CBC with Differential:    Lab Results   Component Value Date    WBC 7.5 09/01/2021    RBC 3.69 09/01/2021    HGB 11.7 09/01/2021    HCT 32.8 09/01/2021     09/01/2021    MCV 88.8 09/01/2021    MCH 30.1 09/01/2021    MCHC 33.9 09/01/2021    RDW 13.2 09/01/2021    LYMPHOPCT 14.8 09/01/2021    MONOPCT 10.0 09/01/2021    BASOPCT 0.6 09/01/2021    MONOSABS 0.7 09/01/2021    LYMPHSABS 1.1 09/01/2021    EOSABS 0.4 09/01/2021    BASOSABS 0.0 09/01/2021     CMP:    Lab Results   Component Value Date     09/01/2021    K 3.0 09/01/2021    K 3.0 09/01/2021    CL 97 09/01/2021    CO2 33 09/01/2021    BUN 13 09/01/2021    CREATININE 0.8 09/01/2021    CREATININE 0.59 09/01/2021    GFRAA >60 09/01/2021    LABGLOM >60 09/01/2021    GLUCOSE 134 09/01/2021    PROT 6.1 09/01/2021    LABALBU 2.3 09/01/2021    CALCIUM 8.3 09/01/2021    BILITOT 0.3 09/01/2021    ALKPHOS 111 09/01/2021    AST 20 09/01/2021    ALT 24 09/01/2021     BMP:    Lab Results   Component Value Date     09/01/2021    K 3.0 09/01/2021    K 3.0 09/01/2021    CL 97 09/01/2021    CO2 33 09/01/2021    BUN 13 09/01/2021    LABALBU 2.3 09/01/2021    CREATININE 0.8 09/01/2021    CREATININE 0.59 09/01/2021    CALCIUM 8.3 09/01/2021    GFRAA >60 09/01/2021    LABGLOM >60 09/01/2021    GLUCOSE 134 09/01/2021     Magnesium:    Lab Results   Component Value Date    MG 2.0 09/01/2021     Troponin:    Lab Results   Component Value Date    TROPONINI <0.010 08/10/2021        Active Hospital Problems    Diagnosis Date Noted    Coagulase negative Staphylococcus bacteremia [R78.81, B95.7]      Priority: Low    Acute respiratory failure with hypoxia (HCC) [J96.01]      Priority: Low    Pneumonia due to COVID-19 virus [U07.1, J12.82] 08/10/2021     Priority: Low    New onset type 2 diabetes mellitus ([facility] [E11.9]      Priority: Low    Hypoxia [R09.02]      Priority: Low        Assessment/Plan:  1. Respiratory failure- vented. On FIO2 75%. Worse and requiring more O2 support. 2. Recurrent Fever - stable now  3. COVID PNA- severe. respiratory failure. 4. New Pulmonary Embolism felt to be related to COVID. - full dose Lovenox. Will change to oral A/C later  5. Bradycardia resolved  6. Tachybrady- keep on Telemetry. K and Mg WNL  7. HTN - labile- stable this am.   8. Echo -EF 65  9.  RVSP 51 mmHg- Moderate PA HTN        Electronically signed by Franky Garcia MD on 9/1/2021 at 11:36 AM

## 2021-09-01 NOTE — PROCEDURES
Internal jugular central venous catheter; Ultrasound guided. [id number]                                                             [id number]    INDICATION:   Vasopressors multiple drips COVID       CONSENT:  The family members were counseled regarding the procedure, it's indications, risks, potential complications and alternatives and any questions were answered. Consent was obtained. PROCEDURE SUMMARY:   A time-out was performed. The patient's left neck region was prepped and draped in sterile fashion. The left internal jugular vein was accessed under ultrasound guidance using a finder needle and sheath. U/S images were permanently documented. Anesthesia was achieved with 1% lidocaine. The finder needle was inserted into the left neck under direct ultrasound guidance, and venous blood was withdrawn. The introducer needle was then inserted under direct ultrasound guidance and venous blood was withdrawn into the syringe. The syringe was removed and the guidewire was advanced through the introducer needle. A small incision was made with a scalpel and the introducer needle was removed. A dilator was advanced over the guidewire until appropriate dilation was obtained. The dilator was removed and an central venous  catheter was advanced over the guidewire and secured into place with 2 sutures at 20 cm. At time of procedure completion, all ports aspirated and flushed properly.        Estimated Blood loss   less than 5 cc    COMPLICATIONS:  None

## 2021-09-01 NOTE — CARE COORDINATION
Team quality ICU rounds done this am outside of patients room. He remains on vent with sedation and IV Levo low dose per report. Continue to follow and monitor for needs /plan.

## 2021-09-01 NOTE — PROGRESS NOTES
Allergies    MEDICATIONS    No current facility-administered medications on file prior to encounter. Current Outpatient Medications on File Prior to Encounter   Medication Sig Dispense Refill    aspirin 81 MG EC tablet Take 81 mg by mouth daily      Naproxen Sodium (ALEVE) 220 MG CAPS Take 220 mg by mouth as needed for Pain         Objective    BP (!) 107/56   Pulse 80   Temp 99.7 °F (37.6 °C) (Bladder)   Resp 30   Ht 6' (1.829 m)   Wt 247 lb 5.7 oz (112.2 kg)   SpO2 99%   BMI 33.55 kg/m²     LABS:  WBC:    Lab Results   Component Value Date    WBC 7.5 09/01/2021     H/H:    Lab Results   Component Value Date    HGB 11.7 09/01/2021    HCT 32.8 09/01/2021     PTT:  No results found for: APTT, PTT[APTT}  PT/INR:  No results found for: PROTIME, INR  HgBA1c:    Lab Results   Component Value Date    LABA1C 13.7 08/10/2021       Assessment   Damaso Risk Score: Damaso Scale Score: 12    Patient Active Problem List   Diagnosis    Pneumonia due to COVID-19 virus    New onset type 2 diabetes mellitus ([facility] [address]    Hypoxia    Acute respiratory failure with hypoxia (HCC)    Coagulase negative Staphylococcus bacteremia       Measurements:  Wound 09/01/21 Mouth Lower;Right (Active)   Wound Image   09/01/21 1131   Dressing Status New dressing applied 09/01/21 1131   Dressing/Treatment Antibacterial ointment 09/01/21 1131   Dressing Change Due 09/02/21 09/01/21 1131   Wound Length (cm) 1 cm 09/01/21 1131   Wound Width (cm) 0.8 cm 09/01/21 1131   Wound Surface Area (cm^2) 0.8 cm^2 09/01/21 1131   Wound Assessment Dry;Fibrinous; Purple/maroon 09/01/21 1131   Drainage Amount None 09/01/21 1131   Odor None 09/01/21 1131   Carri-wound Assessment Blanchable erythema 09/01/21 1131   Margins Defined edges 09/01/21 1131   Number of days: 0       Wound 09/01/21 Ear Right (Active)   Wound Image   09/01/21 1131   Wound Etiology Pressure Unstageable 09/01/21 1131   Dressing Status New dressing applied 09/01/21 1131   Wound Cleansed appropriate for Outpatient [address]: N/A    Referrals:  []   [] [facility]  [] Supplies  [] Other    Patient/Caregiver Teaching:  Level of patient/caregiver understanding able to:   [] Indicates understanding       [] Needs reinforcement  [] Unsuccessful      [] Verbal Understanding  [] Demonstrated understanding       [] No evidence of learning  [] Refused teaching         [x] N/A       Electronically signed by JOSEFA QuinnN, RN, CWOCN on 9/1/2021 at 11:54 AM

## 2021-09-01 NOTE — PROGRESS NOTES
22:00-07:30 Shift summary:        Pt had uneventful shift. Assessments completed (see flowsheets). Pt remains intubated and sedated. OG in remains in place. Placement verified via respitaroy status, external length, residual and pH. T/F infusing per order pt tolerating well. IV drips infusing/titrated per orders (see eMAR) pt tolerating well. L radial A-line and R IJ CVC line remain in place. Medial port to CVC line w/o blood return and unable to be flushed. Proximal and distal port flush w/o diff and positive for blood return. Pt in prone position at start of shift. Pt placed in supine position at 05:30. Pt tolerated repositioning well. AM labs drawn labeled and sent to lab. Safety measures in place. Handoff report given to on coming RN.

## 2021-09-01 NOTE — FLOWSHEET NOTE
1100- patient awake this am not moving extremities on command. Pt was able to close eyes on command. 1700- cvc line replaced    1845- patient proned.

## 2021-09-02 NOTE — PROGRESS NOTES
Pharmacy Vancomycin Consult     Vancomycin Day: 5  Current Dosing: vancomycin 1500mg IV Q12 hours     Recent Labs     09/01/21  0600 09/01/21  0937 09/02/21  0450 09/02/21  0600   BUN 13  --   --  16   CREATININE 0.59*   < > 1.1 0.97   WBC 7.5  --   --  7.0    < > = values in this interval not displayed. Intake/Output Summary (Last 24 hours) at 9/2/2021 1152  Last data filed at 9/2/2021 0600  Gross per 24 hour   Intake 3727 ml   Output 4350 ml   Net -623 ml     Cultures  Recent Labs     08/28/21  0548 08/28/21  0250 08/27/21  1617 08/27/21  1617 08/23/21  1114 08/23/21  0949 08/10/21  0933   BC  --   --   --  No growth after 5 days of incubation.  --    < >  --    BLOODCULT2  --   --   --  1 out of 2 blood cultures*  The following organisms and resistance markers have been  tested using nucleic acid testing technology. ORGANISMS:  Staphylococcus aureus, Staphylococcus epidermidis,  Staphylococcus lugdunensis,Staphylococcus coagulase-negative,  Enterococcus faecalis, Enterococcus faecium, Listeria species,  Streptococcus pneumoniae, Streptococcus pyogenes (Gp A),  Streptococcus agalactiae (Gp B), Streptococcus anginosus gp,  Streptococcus species. METHICILLIN RESISTANCE MARKER:  mec-A  VANCOMYCIN RESISTANCE MARKERS:  van-A  van-B    POSITIVE for  --    < >  --    LABGRAM  --  Moderate WBC's  Moderate Gram negative rods  Moderate Gram negative diplococci  Few Yeast    --   --   --    < >  --    ORG  --  Klebsiella aerogenes*   < > Staphylococcus lugdunensis DNA Detected*  Staphylococcus lugdunensis*  --    < >  --    LABURIN No growth 24 hours  --   --   --    < >   < >  --    COVID19  --   --   --   --   --   --  DETECTED*    < > = values in this interval not displayed. Height: 6' (182.9 cm), Weight: 247 lb 5.7 oz (112.2 kg), Body mass index is 33.55 kg/m². Estimated Creatinine Clearance: 109 mL/min (based on SCr of 0.97 mg/dL).   .    Trough:  Recent Labs     09/02/21  0600   VANCORANDOM 20.5 Assessment/Plan:  Vancomycin trough level resulted at 20.5mcg/mL, which is slightly supra-therapeutic / non-toxic for goal range of 15-20mcg/mL and AUC:YESSICA of 400-600mg/L.hr for indication of sepsis. Pharmacokinetic analysis via [employer] and based on a GOOD  fit dosing model, the following parameters are predicted after dose continuation with vancomycin 1500mg IV Q12 hours. This current regimen of vancomycin 1500mg IV Q12 hours predicts an AUC:YESSICA of 557mg/L.hr, trough: 18mcg/mL, Pauc 99%, Pconc 36%, and tox 14%. Steady state is achieved (>4 days of therapy) Will obtain the next vancomycin level in 24 hours, due to slight spike in SCr from yesterday to today. Ordered for 09/03/21 @ 0600.      Thank you,    Caleb Herron, PharmD   9/2/2021 11:52 AM

## 2021-09-02 NOTE — PROGRESS NOTES
Infectious Disease     Patient Name: Arben Donahue  Date: 9/2/2021  YOB: 1965  Medical Record Number: [id number]      COVID-19 pneumonia  Staphylococcus bacteremia            Fevers 100 or less  Remains on the ventilator FiO2 50%  Still requiring Levophed    Difficulty oxygenating even when prone    Blood culture positive  Culture, Blood 2 [[id number]] (Abnormal) Collected: 08/27/21 1617   Order Status: Completed Specimen: Blood Updated: 08/29/21 0811    Culture, Blood 2 1 out of 2 blood culturesAbnormal     Organism Staphylococcus lugdunensis DNA DetectedAbnormal      Susceptibility    Staphylococcus lugdunensis (2)    Antibiotic Interpretation YESSICA Status    ceFAZolin Sensitive       cefTRIAXone Sensitive       clindamycin Intermediate 0.25 mcg/mL     gentamicin Sensitive <=0.5 mcg/mL     oxacillin Sensitive 0.5 mcg/mL               Review of Systems   Unable to perform ROS: Intubated        Physical Exam  Constitutional:       Appearance: He is ill-appearing and diaphoretic. Cardiovascular:      Heart sounds: Normal heart sounds. No murmur heard. Pulmonary:      Effort: Pulmonary effort is normal.      Breath sounds: No wheezing, rhonchi or rales. Abdominal:      General: Abdomen is flat. There is no distension. Palpations: There is no mass. Tenderness: There is no abdominal tenderness. There is no guarding. Blood pressure (!) 107/56, pulse 63, temperature 97.2 °F (36.2 °C), temperature source Bladder, resp. rate (!) 36, height 6' (1.829 m), weight 247 lb 5.7 oz (112.2 kg), SpO2 99 %.       .   Lab Results   Component Value Date    WBC 7.0 09/02/2021    HGB 10.1 (L) 09/02/2021    HCT 29.6 (L) 09/02/2021    MCV 88.0 09/02/2021     09/02/2021     Lab Results   Component Value Date     09/02/2021    K 3.8 09/02/2021    K 3.8 09/02/2021    CL 96 09/02/2021    CO2 33 09/02/2021    BUN 16 09/02/2021    CREATININE 0.97 09/02/2021    CREATININE 1.1 09/02/2021    GLUCOSE 284 09/02/2021    CALCIUM 8.3 09/02/2021        CULTURE, RESPIRATORY 08/24/2021  5:40 AM  - [facility]   Moderate growth   ID and sensitivity to follow    Organism Abnormal  08/24/2021  5:40 AM MH - [facility]   Gram negative sanjana    CULTURE, RESPIRATORY 08/24/2021  5:40 AM [facility] - [facility]           Acinetobacter baumannii (1)    Antibiotic Interpretation YESSICA Status    ampicillin-sulbactam Sensitive <=2 mcg/mL     cefepime Sensitive 2 mcg/mL     cefTRIAXone Sensitive 8 mcg/mL     ciprofloxacin Sensitive <=0.25 mcg/mL     gentamicin Sensitive <=1 mcg/mL     imipenem Sensitive <=0.25 mcg/mL     tobramycin Sensitive <=1 mcg/mL     Klebsiella aerogenes (2)    Antibiotic Interpretation YESSICA Status    amoxicillin-clavulanate Resistant >=32 mcg/mL     ceFAZolin Resistant >=64 mcg/mL     cefTRIAXone Sensitive <=1 mcg/mL     ciprofloxacin Sensitive <=0.25 mcg/mL     gentamicin Sensitive <=1 mcg/mL     trimethoprim-sulfamethoxazole Sensitive <=20 mcg/mL               PLAN:    COVID-19 pneumonia  PE  Staphylococcus lugdunensis bacteremia oxacillin sensitive  Acinetobacter and Klebsiella from sputum      Empirically on Zosyn   Dc  vancomycin

## 2021-09-02 NOTE — PROGRESS NOTES
2100- patient is proned and VS are stable. Noted skin breakdown on ear and lip. Patient is having a lot of secretions draining from both nostrils. patient's BP increased from restlessness. Increased fentanyl to 200 mcg/hr. Patient is asynchronsis with the ventilator. 2130- Dr. Aramis Peña ordered nimbex. 2200- contacted Dr. Pita Owens about patient being asynchronsis. He ordered ABGs to be drawn and for him to remain proned. Also Dr. Pita Owens discontinued the nimbex. 2315- called Dr. Pita Owens about the ABG results. He ordered a bicarb drip at 150 mL/hr.     0130- called Dr. Pita Owens about patient still not tolerating vent and not reaching his tidal volume. He ordered 10 mg bolus of nimbex, and then hang a nimbex drip. Also to get a chest Xray to confirm ET tube placement. 0200- flipped patient back to supine for chest Xray. Dr. Aramis Peña reviewed xray and said \"it looked okay\". Patient right away started meeting his tidal volume and SpO2 at 98%. Contacted Dr. Pita Owens about his improvement and said to not give nimbex and to keep on his back. 0600- patient is stable and comfortable.

## 2021-09-02 NOTE — CARE COORDINATION
Team quality ICU rounds done outside of patients room. He continues on vent with weaning trials. He is sedated and on Levo. Continue to follow for any  needs.

## 2021-09-02 NOTE — PROGRESS NOTES
Department of Internal Medicine  General Internal Medicine  Attending Progress Note      SUBJECTIVE:  Pt seen through ICU door to reduce exposure to COVID-19. Pt intubated.  On pressors    OBJECTIVE      Medications    Current Facility-Administered Medications: cisatracurium Besylate injection SOLN 10 mg, 10 mg, IntraVENous, Once  insulin lispro (HUMALOG) injection vial 0-18 Units, 0-18 Units, SubCUTAneous, Q4H  potassium chloride 20 mEq/50 mL IVPB (Central Line), 20 mEq, IntraVENous, PRN  mupirocin (BACTROBAN) 2 % ointment, , Topical, TID  [COMPLETED] vancomycin (VANCOCIN) 1,750 mg in dextrose 5 % 500 mL IVPB, 1,750 mg, IntraVENous, Once **FOLLOWED BY** vancomycin (VANCOCIN) 1,500 mg in dextrose 5 % 500 mL IVPB, 1,500 mg, IntraVENous, Q12H  clonazePAM (KLONOPIN) tablet 0.5 mg, 0.5 mg, Oral, TID  metoclopramide (REGLAN) injection 10 mg, 10 mg, IntraVENous, Q12H  norepinephrine (LEVOPHED) 16 mg in dextrose 5% 250 mL infusion, 2-100 mcg/min, IntraVENous, Continuous  vancomycin (VANCOCIN) intermittent dosing (placeholder), , Other, RX Placeholder  insulin glargine (LANTUS) injection vial 30 Units, 30 Units, SubCUTAneous, BID  dexmedetomidine (PRECEDEX) 400 mcg in sodium chloride 0.9 % 100 mL infusion, 0.2-1.4 mcg/kg/hr, IntraVENous, Continuous  acetaminophen (TYLENOL) tablet 650 mg, 650 mg, Oral, Q4H PRN **OR** acetaminophen (TYLENOL) suppository 650 mg, 650 mg, Rectal, Q4H PRN  insulin lispro (HUMALOG) injection vial 4 Units, 4 Units, SubCUTAneous, 4x Daily  docusate (COLACE) 50 MG/5ML liquid 100 mg, 100 mg, Per NG tube, BID  fentaNYL (SUBLIMAZE) 1,000 mcg in sodium chloride 0.9 % 100 mL infusion, 12.5-200 mcg/hr, IntraVENous, Continuous  piperacillin-tazobactam (ZOSYN) 3,375 mg in dextrose 5 % 50 mL IVPB extended infusion (mini-bag), 3,375 mg, IntraVENous, Q8H  chlorhexidine (PERIDEX) 0.12 % solution 15 mL, 15 mL, Mouth/Throat, BID  pantoprazole (PROTONIX) injection 40 mg, 40 mg, IntraVENous, Daily **AND** sodium chloride (PF) 0.9 % injection 10 mL, 10 mL, IntraVENous, Daily  propofol injection, 5-50 mcg/kg/min, IntraVENous, Titrated  enoxaparin (LOVENOX) injection 105 mg, 1 mg/kg, SubCUTAneous, BID  guaiFENesin-codeine (GUAIFENESIN AC) 100-10 MG/5ML liquid 5 mL, 5 mL, Oral, Q4H PRN  polyethylene glycol (GLYCOLAX) packet 17 g, 17 g, Oral, Daily  sodium chloride flush 0.9 % injection 5-40 mL, 5-40 mL, IntraVENous, 2 times per day  sodium chloride flush 0.9 % injection 5-40 mL, 5-40 mL, IntraVENous, PRN  0.9 % sodium chloride infusion, 25 mL, IntraVENous, PRN  ondansetron (ZOFRAN-ODT) disintegrating tablet 4 mg, 4 mg, Oral, Q8H PRN **OR** ondansetron (ZOFRAN) injection 4 mg, 4 mg, IntraVENous, Q6H PRN  polyethylene glycol (GLYCOLAX) packet 17 g, 17 g, Oral, Daily PRN  magnesium sulfate 2000 mg in 50 mL IVPB premix, 2,000 mg, IntraVENous, PRN  ipratropium-albuterol (DUONEB) nebulizer solution 1 ampule, 1 ampule, Inhalation, Q4H PRN  hydrALAZINE (APRESOLINE) injection 10 mg, 10 mg, IntraVENous, Q6H PRN  glucose (GLUTOSE) 40 % oral gel 15 g, 15 g, Oral, PRN  dextrose 50 % IV solution, 12.5 g, IntraVENous, PRN  glucagon (rDNA) injection 1 mg, 1 mg, IntraMUSCular, PRN  dextrose 5 % solution, 100 mL/hr, IntraVENous, PRN  Physical    VITALS:  BP (!) 107/56   Pulse 66   Temp 97.2 °F (36.2 °C) (Bladder)   Resp 18   Ht 6' (1.829 m)   Wt 247 lb 5.7 oz (112.2 kg)   SpO2 100%   BMI 33.55 kg/m²   Constitutional: intubated and sedated  Head: Normocephalic, atraumatic  Lungs: no tachypnea, on the ventilator  Heart: tachycardic on tele  GI: obese     Data    CBC:   Lab Results   Component Value Date    WBC 7.0 09/02/2021    RBC 3.37 09/02/2021    HGB 10.1 09/02/2021    HCT 29.6 09/02/2021    MCV 88.0 09/02/2021    MCH 29.9 09/02/2021    MCHC 34.0 09/02/2021    RDW 13.4 09/02/2021     09/02/2021     CMP:    Lab Results   Component Value Date     09/02/2021    K 3.8 09/02/2021    K 3.8 09/02/2021    CL 96 09/02/2021    CO2 33 09/02/2021    BUN 16 09/02/2021    CREATININE 0.97 09/02/2021    CREATININE 1.1 09/02/2021    GFRAA >60.0 09/02/2021    LABGLOM >60.0 09/02/2021    GLUCOSE 284 09/02/2021    PROT 5.9 09/02/2021    LABALBU 2.2 09/02/2021    CALCIUM 8.3 09/02/2021    BILITOT 0.3 09/02/2021    ALKPHOS 125 09/02/2021    AST 33 09/02/2021    ALT 35 09/02/2021       ASSESSMENT AND PLAN      # Acute hypoxic respiratory failure 2/2 COVID-19 pneumonia  - was intubated on 8/22  - sedated, paralyzed. Prone as tolerated  - critical care consulted  - completed course of remdesivir, actemra  - continuing decadron, lovenox  - ID consulted  - Resp Cx with acinetobacter and klebsiella  - empiric zosyn and vancomycin  -Discussed plan of care with patient's son 8/31    # New onset diabetes  - cont lantus, ISS  - endocrinology consulted    # bacteremia   - resume abx as per ID  - repeat blood cultures     DVT: lovenox per covid protocol    Disposition: Pt continues to be on vent. Critical care managing. Continuing decadron, zosyn. ID consulted. 20-day documentation  58-year-old male admitted with hypoxic respiratory failure secondary to COVID-19 pneumonia, intubated 8/22 status post remdesivir Actemra and day 10 Decadron  Continued to be intubated. Unable to wean. On zosyn for gram negative pneumonia and levophed.  Critical pt. ?trach as patient unable to wean from vent        Linette Coleman, DO   Internal Medicine

## 2021-09-02 NOTE — PROGRESS NOTES
Pulmonary & Critical Care Medicine ICU Progress Note  Chief complaint : Severe acute hypoxic respiratory failure    Subjunctive/24 hour events :   Patient seen and examined during multidisciplinary rounds with RN, charge nurse, RT, pharmacy, dietitian, and social service. Yesterday had problem oxygenating while prone, was acidotic, supine position helped actually his oxygenation improved, due to acidosis he was started on bicarb drip, it is fixed today, and will DC bicarb drip, he is sedated does not follow commands, he is on Levophed at 2 mcg/min,+ bowel movement, no fever, urine output 4.3 L. Social History     Tobacco Use    Smoking status: Never Smoker    Smokeless tobacco: Never Used   Substance Use Topics    Alcohol use: Never     History reviewed. No pertinent family history. Recent Labs     09/01/21  2253 09/02/21  0450   PHART 7.117* 7.422   [id number] 121* 52*   PO2ART 262* 154*       MV Settings:  Vent Mode: AC/VC Rate Set: 30 bmp/Vt Ordered: 480 mL/ /FiO2 : 50 %           IV:   sodium bicarbonate infusion 150 mL/hr at 09/02/21 0039    norepinephrine Stopped (09/01/21 1300)    dexmedetomidine Stopped (09/02/21 0806)    fentaNYL (SUBLIMAZE) infusion 175 mcg/hr (09/02/21 0555)    propofol 40 mcg/kg/min (09/02/21 0700)    sodium chloride 25 mL (08/30/21 2024)    dextrose         Vitals:  BP (!) 107/56   Pulse 55   Temp 97.2 °F (36.2 °C) (Bladder)   Resp 30   Ht 6' (1.829 m)   Wt 247 lb 5.7 oz (112.2 kg)   SpO2 98%   BMI 33.55 kg/m²    Tmax:        Intake/Output Summary (Last 24 hours) at 9/2/2021 0831  Last data filed at 9/2/2021 0600  Gross per 24 hour   Intake 3727 ml   Output 4350 ml   Net -623 ml       EXAM:  General: Sedated, no distress, calm  Head: normocephalic, atraumatic  Eyes:No gross abnormalities.   ENT:  MMM no lesions,  ET tube and OG tube  Neck:  supple and no masses, left side IJ triple-lumen catheter  Chest : Good air movement, no wheezing, no rales, nontender, tympanic  Heart[de-identified] Heart sounds are normal.  Regular rate and rhythm without murmur, gallop or rub. ABD:  symmetric, soft, non-tender, no guarding or rebound  Musculoskeletal : no cyanosis, no clubbing and no edema  Neuro: Sedated, did not follow commands. Skin: No rashes or nodules noted. Lymph node:  no cervical nodes  Urology: Yes [facility]   Psychiatric: Sedated on vent    Medications:  Scheduled Meds:   cisatracurium Besylate  10 mg IntraVENous Once    mupirocin   Topical TID    vancomycin  1,500 mg IntraVENous Q12H    clonazePAM  0.5 mg Oral TID    metoclopramide  10 mg IntraVENous Q12H    vancomycin (VANCOCIN) intermittent dosing (placeholder)   Other RX Placeholder    insulin glargine  30 Units SubCUTAneous BID    insulin lispro  4 Units SubCUTAneous 4x Daily    docusate  100 mg Per NG tube BID    insulin lispro  0-12 Units SubCUTAneous Q4H    piperacillin-tazobactam  3,375 mg IntraVENous Q8H    chlorhexidine  15 mL Mouth/Throat BID    pantoprazole  40 mg IntraVENous Daily    And    sodium chloride (PF)  10 mL IntraVENous Daily    enoxaparin  1 mg/kg SubCUTAneous BID    polyethylene glycol  17 g Oral Daily    sodium chloride flush  5-40 mL IntraVENous 2 times per day       PRN Meds:  potassium chloride, acetaminophen **OR** acetaminophen, guaiFENesin-codeine, sodium chloride flush, sodium chloride, ondansetron **OR** ondansetron, polyethylene glycol, magnesium sulfate, ipratropium-albuterol, hydrALAZINE, glucose, dextrose, glucagon (rDNA), dextrose    Results: reviewed by me   CBC:   Recent Labs     08/31/21  0540 08/31/21  1041 09/01/21  0600 09/01/21  0937 09/01/21  2253 09/02/21  0450 09/02/21  0600   WBC 7.6  --  7.5  --   --   --  7.0   HGB 11.3*   < > 11.1*   < > 13.0* 10.4* 10.1*   HCT 34.1*  --  32.8*  --   --   --  29.6*   MCV 88.3  --  88.8  --   --   --  88.0     --  274  --   --   --  267    < > = values in this interval not displayed.      BMP:   Recent Labs 08/31/21  0539 08/31/21  1041 09/01/21  0600 09/01/21  0937 09/01/21  2253 09/02/21  0450 09/02/21  0600     --  139  --   --   --  137   K 3.2*  3.2*   < > 3.0*  3.0*  --   --   --  3.8  3.8     --  97  --   --   --  96   CO2 32*  --  33*  --   --   --  33*   PHOS 2.4  --  2.9  --   --   --  3.6   BUN 15  --  13  --   --   --  16   CREATININE 0.57*   < > 0.59*   < > 1.2 1.1 0.97    < > = values in this interval not displayed. LIVER PROFILE:   Recent Labs     08/31/21  0539 09/01/21  0600 09/02/21  0600   AST 21 20 33   ALT 27 24 35   BILITOT 0.3 0.3 0.3   ALKPHOS 124* 111* 125*     PT/INR: No results for input(s): PROTIME, INR in the last 72 hours. APTT: No results for input(s): APTT in the last 72 hours. UA:No results for input(s): NITRITE, COLORU, PHUR, LABCAST, WBCUA, RBCUA, MUCUS, TRICHOMONAS, YEAST, BACTERIA, CLARITYU, SPECGRAV, LEUKOCYTESUR, UROBILINOGEN, BILIRUBINUR, BLOODU, GLUCOSEU, AMORPHOUS in the last 72 hours. Invalid input(s): KETONESU    Cultures:  Sputum culture grows Klebsiella aerogenes   Blood culture grew Staphylococcus lugdunensis      Films:  Chest x-ray today, congested, and bilateral groundglass infiltrative changes. Assessment:   This is a critically ill patient at risk of deterioration / death , needing close ICU monitoring and intervention due to below noted problems     · Severe acute hypoxic respiratory failure  · Severe COVID-19 infection   · Pulmonary emboli due to COVID-19  · Bacterial coinfection, Klebsiella  · Staphylococcus lugdunensis septicemia  · Shock physiology resolved, currently not on Levophed   · Hyperglycemia  · Hypercoagulable state    Recommendations  · Vent support lung protective strategy  · head of the bed 30°  · Breathing trial later today  · Minimize to no sedation if possible  · Watch for ICU delirium: TV on, natural light, avoid benzos, pain control, early mobility, and family engagement  · PUD prophylaxis  · DVT prophylaxis, continue

## 2021-09-02 NOTE — PROGRESS NOTES
Progress Note  Patient: Torey Horowitz  Unit/Bed: IC11/IC11-01  YOB: 1965  MRN: [id number]  Acct: [de-identified]   Admitting Diagnosis: Hypoxia [R09.02]  Acute respiratory failure with hypoxia ([facility] [J96.01]  COVID-19 [U07.1]  Admit Date:  8/10/2021  Hospital Day: 23    Chief Complaint: Bradycardia COVID    Histories:  Past Medical History:   Diagnosis Date    New onset type 2 diabetes mellitus ([facility]      Past Surgical History:   Procedure Laterality Date    SCAPULA SURGERY Left      History reviewed. No pertinent family history. Social History     Socioeconomic History    Marital status: Unknown     Spouse name: None    Number of children: None    Years of education: None    Highest education level: None   Occupational History    None   Tobacco Use    Smoking status: Never Smoker    Smokeless tobacco: Never Used   Vaping Use    Vaping Use: Never used   Substance and Sexual Activity    Alcohol use: Never    Drug use: Never    Sexual activity: Never   Other Topics Concern    None   Social History Narrative    None     Social Determinants of Health     Financial Resource Strain:     Difficulty of Paying Living Expenses:    Food Insecurity:     Worried About Running Out of Food in the Last Year:     Ran Out of Food in the Last Year:    Transportation Needs:     Lack of Transportation (Medical):      Lack of Transportation (Non-Medical):    Physical Activity:     Days of Exercise per Week:     Minutes of Exercise per Session:    Stress:     Feeling of Stress :    Social Connections:     Frequency of Communication with Friends and Family:     Frequency of Social Gatherings with Friends and Family:     Attends Mandaen Services:     Active Member of Clubs or Organizations:     Attends Club or Organization Meetings:     Marital Status:    Intimate Partner Violence:     Fear of Current or Ex-Partner:     Emotionally Abused:     Physically Abused:     Sexually Abused: Subjective/HPI Pt remains vented and heavily sedated. 50% FIO2. No Fever. On Levo TF via OG. U/O excellent    EKG:   ST 92        Review of Systems:   Review of Systems  Vented. Physical Examination:    BP (!) 107/56   Pulse 55   Temp 97.2 °F (36.2 °C) (Bladder)   Resp 30   Ht 6' (1.829 m)   Wt 247 lb 5.7 oz (112.2 kg)   SpO2 98%   BMI 33.55 kg/m²    Physical Exam   Constitutional: He appears healthy. No distress. HENT:   Normal cephalic and Atraumatic   Eyes: Pupils are equal, round, and reactive to light. Neck: Thyroid normal. No JVD present. No neck adenopathy. No thyromegaly present. Cardiovascular: Regular rhythm, normal heart sounds, intact distal pulses and normal pulses. Pulmonary/Chest: Effort normal and breath sounds normal. He has no wheezes. He has no rales. He exhibits no tenderness. Abdominal: Soft. Bowel sounds are normal. There is no abdominal tenderness. Musculoskeletal:         General: No tenderness or edema. Normal range of motion. Cervical back: Normal range of motion and neck supple. Neurological: He is alert and oriented to person, place, and time. Skin: Skin is warm. No cyanosis. Nails show no clubbing.        LABS:  CBC:   Lab Results   Component Value Date    WBC 7.0 09/02/2021    RBC 3.37 09/02/2021    HGB 10.1 09/02/2021    HCT 29.6 09/02/2021    MCV 88.0 09/02/2021    MCH 29.9 09/02/2021    MCHC 34.0 09/02/2021    RDW 13.4 09/02/2021     09/02/2021     CBC with Differential:    Lab Results   Component Value Date    WBC 7.0 09/02/2021    RBC 3.37 09/02/2021    HGB 10.1 09/02/2021    HCT 29.6 09/02/2021     09/02/2021    MCV 88.0 09/02/2021    MCH 29.9 09/02/2021    MCHC 34.0 09/02/2021    RDW 13.4 09/02/2021    LYMPHOPCT 11.8 09/02/2021    MONOPCT 8.9 09/02/2021    BASOPCT 0.6 09/02/2021    MONOSABS 0.6 09/02/2021    LYMPHSABS 0.8 09/02/2021    EOSABS 0.1 09/02/2021    BASOSABS 0.0 09/02/2021     CMP:    Lab Results   Component

## 2021-09-02 NOTE — PROGRESS NOTES
0700 Assumed care of pt. Handoff report from Nora Randhawa, [facility].    0800 Shift assessment completed. Pt unable to follow commands and remains unresponsive to verabal/painful stimuli. Bicarb drip discontinued per physician order. Levophed resumed at 2mcg due to decreased blood pressure.     1830- Pt remained on levo, fentanyl, propofol

## 2021-09-02 NOTE — PROGRESS NOTES
> 3.2*  3.2*  --   --  4.1 3.0*  3.0*  --   --    CL 97  --  100  --   --   --  97  --   --    CO2 29  --  32*  --   --   --  33*  --   --    BUN 15  --  15  --   --   --  13  --   --    CREATININE 0.56*   < > 0.57*   < >   < >  --  0.59* 0.8* 1.2   GLUCOSE 163*  --  110*  --   --   --  134*  --   --    PHOS 3.0  --  2.4  --   --   --  2.9  --   --    MG 2.0  --  2.1  --   --   --  2.0  --   --     < > = values in this interval not displayed. PT/INR:No results for input(s): PROTIME, INR in the last 72 hours. APTT:No results for input(s): APTT in the last 72 hours. LIVER PROFILE:  Recent Labs     08/30/21  0527 08/31/21  0539 09/01/21  0600   AST 17 21 20   ALT 23 27 24   BILITOT 0.4 0.3 0.3   ALKPHOS 107* 124* 111*       Imaging Last 24 Hours:  Echocardiogram complete 2D with doppler with color    Result Date: 8/31/2021  Transthoracic Echocardiography Report (TTE)  Demographics   Patient Name    Nohemy Reno Gender                Male   Patient Number  [id number]      Race                                                   Ethnicity   Visit Number    [id number]     Room Number           IC11   Corporate ID                  Date of Study         08/31/2021   Accession       [id number]    Referring Physician   Oly Roa MD  Number   Date of Birth   1965    Sonographer           Elvia Palomares   Age             61 year(s)    Interpreting          [facility])                                Physician             Cardiology                                                      [address] [place] [address]  Procedure Type of Study   TTE procedure:ECHO COMPLETE 2D W/DOP W/COLOR. Procedure Date Date: 08/31/2021 Start: 10:27 AM Study Location: Portable Technical Quality: Adequate visualization Indications:LVF. Patient Status: Routine Height: 69 inches Weight: 252 pounds BSA: 2.28 m^2 BMI: 37.21 kg/m^2 BP: 191/71 mmHg  Conclusions   Summary  Normal tricuspid valve structure and function.   There is mild ( 1 +) tricuspid regurgitation with estimated RVSP of 51 mm  Hg. Mildly dilated left atrium. Left ventricular ejection fraction is visually estimated at 65%. Mildly enlarged right ventricle cavity. Signature   ----------------------------------------------------------------  Electronically signed by Jeramy Menezes(Interpreting physician)  on 08/31/2021 12:38 PM  ----------------------------------------------------------------   Findings  Left Ventricle Left ventricular ejection fraction is visually estimated at 65%. Right Ventricle Mildly enlarged right ventricle cavity. Left Atrium Mildly dilated left atrium. Right Atrium Normal right atrium. Mitral Valve Normal mitral valve structure and function. Tricuspid Valve Normal tricuspid valve structure and function. There is mild ( 1 +) tricuspid regurgitation with estimated RVSP of 51 mm Hg. Aortic Valve Normal aortic valve structure and function. Pulmonic Valve Normal pulmonic valve structure and function. Pericardial Effusion No evidence of pericardial effusion. Pleural Effusion No evidence of pleural effusion. Aorta \ Miscellaneous Miscellaneous normal findings were found. Doppler Measurements:   TR Velocity:3.21 m/s  TR Gradient:41.11 mmHg                                     Estimated RAP:10 mmHg                                     RVSP:51.11 mmHg  Valves  Tricuspid Valve   Estimated RVSP: 51.11 mmHg              Estimated RAP: 10 mmHg  TR Velocity: 3.21 m/s                   TR Gradient: 41.11 mmHg   Pulmonic Valve            Estimated PASP: 51.11 mmHg  Structures  Right Atrium   RA Systolic Pressure: 10 mmHg   Right Ventricle            RV Systolic Pressure: 84.51 mmHg      XR CHEST PORTABLE    Result Date: 9/1/2021  EXAMINATION: XR CHEST PORTABLE CLINICAL HISTORY: LINE PLACEMENT COMPARISONS: SEPTEMBER 1, 2021, 0931 HOURS. FINDINGS: Endotracheal tube 5 cm superior to ej. Nasogastric tube courses beneath diaphragm.  Right jugular vein central line tip in junction right atrium and superior vena cava. Left jugular vein central line tip superior vena cava. Patient leaning to right. Cardiopericardial silhouette normal. Ill-defined areas increase opacity are again found in the right lung, less prominent than prior study. Increased opacity also identified in the left lung, with progression in left midlung zone and no change in the left lower lung. BILATERAL ATELECTASIS/PNEUMONIA VERSUS EDEMA    XR CHEST PORTABLE    Result Date: 9/1/2021  EXAMINATION: XR CHEST PORTABLE CLINICAL HISTORY: RESPIRATORY FAILURE COMPARISONS: AUGUST 30, 2021 FINDINGS: Endotracheal tube 7 cm superior to ej. Nasogastric tube courses beneath diaphragm the stomach. Right jugular vein central line junction right atrium and superior vena cava. Cardiopericardial silhouette enlarged. Osseous structures intact. Diffuse bilateral airspace disease again identified. CARDIOMEGALY. BILATERAL ATELECTASIS/PNEUMONIA VERSUS EDEMA. Assessment//Plan           Hospital Problems         Last Modified POA    Pneumonia due to COVID-19 virus 8/17/2021 Yes    New onset type 2 diabetes mellitus ([address] 8/10/2021 Yes    Hypoxia 8/10/2021 Yes    Acute respiratory failure with hypoxia ([address] 8/22/2021 Yes    Coagulase negative Staphylococcus bacteremia 8/29/2021 Yes        Assessment:    Condition: In critical condition. Unchanged. (Uncontrolled type 2 diabetes new onset type 2 diabetes  COVID-19 pneumonia  Respiratory failure hypoxia  ). Plan:   (Continue Lantus 30 units twice a day plus Humalog 4 units 4 times daily  Plus Humalog coverage  Continue IV Zosyn  Virtual visit seen through glass window  COVID-19 protocol  Total time spent 15 minutes).        Electronically signed by Lang Lane MD on 9/1/21 at 11:03 PM EDT

## 2021-09-02 NOTE — PROGRESS NOTES
PHARMACY NOTE:   Interdisciplinary Rounds Completed     ICU Day #16     Changes made today by Pharmacy:   Increased sliding scale insulin to high dose (Humalog)       Additional information:   Insulin coverage: increase to high dose Humalog, per sliding scale, utilized 30 units per medium sliding scale in the past 24 hours, also on Lantus 30 units BID   Pressors: restarting norepinephrine this morning; also on midodrine 5mg PO BID   Sedation: precedex, fentanyl, propofol   Antimicrobial therapy, day #11: piperacillin-tazobactam 3.375gm IV Q8 hours                                       Day #5: vancomycin, pharmacy to dose, currently at 1500mg IV Q12 hours, with next random level 09/03/21 @ 0600 due to increase in SCr overnight                                        Remdesivir completed 08/11/21 - 08/14/21                                       Infectious disease services following patient   Core measures assessed/met    Magdi Randall PharmBURT   9/2/2021 11:56 AM

## 2021-09-02 NOTE — PLAN OF CARE
Problem: Falls - Risk of:  Goal: Will remain free from falls  Description: Will remain free from falls  Outcome: Ongoing  Goal: Absence of physical injury  Description: Absence of physical injury  Outcome: Ongoing     Problem: Airway Clearance - Ineffective  Goal: Achieve or maintain patent airway  Outcome: Ongoing     Problem: Gas Exchange - Impaired  Goal: Absence of hypoxia  Outcome: Ongoing  Goal: Promote optimal lung function  Outcome: Ongoing     Problem: Breathing Pattern - Ineffective  Goal: Ability to achieve and maintain a regular respiratory rate  Outcome: Ongoing     Problem:  Body Temperature -  Risk of, Imbalanced  Goal: Ability to maintain a body temperature within defined limits  Outcome: Ongoing  Goal: Will regain or maintain usual level of consciousness  Outcome: Ongoing  Goal: Complications related to the disease process, condition or treatment will be avoided or minimized  Outcome: Ongoing     Problem: Isolation Precautions - Risk of Spread of Infection  Goal: Prevent transmission of infection  Outcome: Ongoing     Problem: Nutrition Deficits  Goal: Optimize nutritional status  Outcome: Ongoing     Problem: Risk for Fluid Volume Deficit  Goal: Maintain normal heart rhythm  Outcome: Ongoing  Goal: Maintain absence of muscle cramping  Outcome: Ongoing  Goal: Maintain normal serum potassium, sodium, calcium, phosphorus, and pH  Outcome: Ongoing     Problem: Loneliness or Risk for Loneliness  Goal: Demonstrate positive use of time alone when socialization is not possible  Outcome: Ongoing     Problem: Fatigue  Goal: Verbalize increase energy and improved vitality  Outcome: Ongoing     Problem: Patient Education: Go to Patient Education Activity  Goal: Patient/Family Education  Outcome: Ongoing     Problem: Skin Integrity:  Goal: Will show no infection signs and symptoms  Description: Will show no infection signs and symptoms  Outcome: Ongoing  Goal: Absence of new skin breakdown  Description: Absence of new skin breakdown  Outcome: Ongoing     Problem: Pain:  Goal: Pain level will decrease  Description: Pain level will decrease  Outcome: Ongoing  Goal: Control of acute pain  Description: Control of acute pain  Outcome: Ongoing  Goal: Control of chronic pain  Description: Control of chronic pain  Outcome: Ongoing     Problem: Non-Violent Restraints  Goal: Removal from restraints as soon as assessed to be safe  Outcome: Ongoing  Goal: No harm/injury to patient while restraints in use  Outcome: Ongoing  Goal: Patient's dignity will be maintained  Outcome: Ongoing     Problem: Nutrition  Goal: Optimal nutrition therapy  Outcome: Ongoing   Continue to follow the plan of care.

## 2021-09-03 NOTE — PROGRESS NOTES
Progress Note  Patient: Adebayo Mccullough  Unit/Bed: IC11/IC11-01  YOB: 1965  MRN: [id number]  Acct: [de-identified]   Admitting Diagnosis: Hypoxia [R09.02]  Acute respiratory failure with hypoxia ([facility] [J96.01]  COVID-19 [U07.1]  Admit Date:  8/10/2021  Hospital Day: 24    Chief Complaint: Bradycardia COVID    Histories:  Past Medical History:   Diagnosis Date    New onset type 2 diabetes mellitus ([facility]      Past Surgical History:   Procedure Laterality Date    SCAPULA SURGERY Left      History reviewed. No pertinent family history. Social History     Socioeconomic History    Marital status: Unknown     Spouse name: None    Number of children: None    Years of education: None    Highest education level: None   Occupational History    None   Tobacco Use    Smoking status: Never Smoker    Smokeless tobacco: Never Used   Vaping Use    Vaping Use: Never used   Substance and Sexual Activity    Alcohol use: Never    Drug use: Never    Sexual activity: Never   Other Topics Concern    None   Social History Narrative    None     Social Determinants of Health     Financial Resource Strain:     Difficulty of Paying Living Expenses:    Food Insecurity:     Worried About Running Out of Food in the Last Year:     Ran Out of Food in the Last Year:    Transportation Needs:     Lack of Transportation (Medical):      Lack of Transportation (Non-Medical):    Physical Activity:     Days of Exercise per Week:     Minutes of Exercise per Session:    Stress:     Feeling of Stress :    Social Connections:     Frequency of Communication with Friends and Family:     Frequency of Social Gatherings with Friends and Family:     Attends Anglican Services:     Active Member of Clubs or Organizations:     Attends Club or Organization Meetings:     Marital Status:    Intimate Partner Violence:     Fear of Current or Ex-Partner:     Emotionally Abused:     Physically Abused:     Sexually Abused: Subjective/HPI Pt remains vented and heavily sedated. 50% FIO2. No Fever. On Levo TF via OG. U/O good    EKG:   SR 82        Review of Systems:   Review of Systems  Vented. Physical Examination:    BP (!) 107/56   Pulse 90   Temp 100.4 °F (38 °C) (Bladder)   Resp 20   Ht 6' (1.829 m)   Wt 247 lb 5.7 oz (112.2 kg)   SpO2 99%   BMI 33.55 kg/m²    Physical Exam   Constitutional: He appears healthy. No distress. HENT:   Normal cephalic and Atraumatic   Eyes: Pupils are equal, round, and reactive to light. Neck: Thyroid normal. No JVD present. No neck adenopathy. No thyromegaly present. Cardiovascular: Regular rhythm, normal heart sounds, intact distal pulses and normal pulses. Pulmonary/Chest: Effort normal and breath sounds normal. He has no wheezes. He has no rales. He exhibits no tenderness. Abdominal: Soft. Bowel sounds are normal. There is no abdominal tenderness. Musculoskeletal:         General: No tenderness or edema. Normal range of motion. Cervical back: Normal range of motion and neck supple. Neurological: He is alert and oriented to person, place, and time. Skin: Skin is warm. No cyanosis. Nails show no clubbing.        LABS:  CBC:   Lab Results   Component Value Date    WBC 8.0 09/03/2021    RBC 3.30 09/03/2021    HGB 10.1 09/03/2021    HCT 28.9 09/03/2021    MCV 87.7 09/03/2021    MCH 30.6 09/03/2021    MCHC 34.9 09/03/2021    RDW 13.4 09/03/2021     09/03/2021     CBC with Differential:    Lab Results   Component Value Date    WBC 8.0 09/03/2021    RBC 3.30 09/03/2021    HGB 10.1 09/03/2021    HCT 28.9 09/03/2021     09/03/2021    MCV 87.7 09/03/2021    MCH 30.6 09/03/2021    MCHC 34.9 09/03/2021    RDW 13.4 09/03/2021    LYMPHOPCT 10.8 09/03/2021    MONOPCT 7.1 09/03/2021    BASOPCT 0.6 09/03/2021    MONOSABS 0.6 09/03/2021    LYMPHSABS 0.9 09/03/2021    EOSABS 0.4 09/03/2021    BASOSABS 0.1 09/03/2021     CMP:    Lab Results   Component Value Date     09/03/2021    K 2.8 09/03/2021    K 2.8 09/03/2021    CL 96 09/03/2021    CO2 35 09/03/2021    BUN 18 09/03/2021    CREATININE 1.34 09/03/2021    CREATININE 1.8 09/03/2021    GFRAA >60.0 09/03/2021    LABGLOM 54.8 09/03/2021    GLUCOSE 90 09/03/2021    PROT 5.7 09/03/2021    LABALBU 2.2 09/03/2021    CALCIUM 8.2 09/03/2021    BILITOT <0.2 09/03/2021    ALKPHOS 118 09/03/2021    AST 25 09/03/2021    ALT 29 09/03/2021     BMP:    Lab Results   Component Value Date     09/03/2021    K 2.8 09/03/2021    K 2.8 09/03/2021    CL 96 09/03/2021    CO2 35 09/03/2021    BUN 18 09/03/2021    LABALBU 2.2 09/03/2021    CREATININE 1.34 09/03/2021    CREATININE 1.8 09/03/2021    CALCIUM 8.2 09/03/2021    GFRAA >60.0 09/03/2021    LABGLOM 54.8 09/03/2021    GLUCOSE 90 09/03/2021     Magnesium:    Lab Results   Component Value Date    MG 1.9 09/03/2021     Troponin:    Lab Results   Component Value Date    TROPONINI <0.010 08/10/2021        Active Hospital Problems    Diagnosis Date Noted    Coagulase negative Staphylococcus bacteremia [R78.81, B95.7]      Priority: Low    Acute respiratory failure with hypoxia (H[facility]) [J96.01]      Priority: Low    Pneumonia due to COVID-19 virus [U07.1, J12.82] 08/10/2021     Priority: Low    New onset type 2 diabetes mellitus ([facility] [E11.9]      Priority: Low    Hypoxia [R09.02]      Priority: Low        Assessment/Plan:  1. Respiratory failure- vented. On FIO2 50%. continue to wean as tolerated. 2. Recurrent Fever - stable now  3. COVID PNA- severe. respiratory failure. 4. New Pulmonary Embolism felt to be related to COVID. - full dose Lovenox. Will change to oral A/C later  5. Bradycardia resolved  6. Tachybrady- keep on Telemetry. K and Mg WNL  7. HTN - labile- stable this am.   8. Echo -EF 65  9.  RVSP 51 mmHg- Moderate PA HTN        Electronically signed by Dylan Gaitan MD on 9/3/2021 at 10:39 AM

## 2021-09-03 NOTE — PLAN OF CARE
Problem: Falls - Risk of:  Goal: Will remain free from falls  Description: Will remain free from falls  Outcome: Ongoing  Goal: Absence of physical injury  Description: Absence of physical injury  Outcome: Ongoing     Problem: Airway Clearance - Ineffective  Goal: Achieve or maintain patent airway  Outcome: Ongoing     Problem: Breathing Pattern - Ineffective  Goal: Ability to achieve and maintain a regular respiratory rate  Outcome: Ongoing     Problem:  Body Temperature -  Risk of, Imbalanced  Goal: Ability to maintain a body temperature within defined limits  Outcome: Ongoing  Goal: Will regain or maintain usual level of consciousness  Outcome: Ongoing  Goal: Complications related to the disease process, condition or treatment will be avoided or minimized  Outcome: Ongoing     Problem: Isolation Precautions - Risk of Spread of Infection  Goal: Prevent transmission of infection  Outcome: Ongoing     Problem: Nutrition Deficits  Goal: Optimize nutritional status  Outcome: Ongoing     Problem: Risk for Fluid Volume Deficit  Goal: Maintain normal heart rhythm  Outcome: Ongoing  Goal: Maintain absence of muscle cramping  Outcome: Ongoing  Goal: Maintain normal serum potassium, sodium, calcium, phosphorus, and pH  Outcome: Ongoing     Problem: Loneliness or Risk for Loneliness  Goal: Demonstrate positive use of time alone when socialization is not possible  Outcome: Ongoing     Problem: Fatigue  Goal: Verbalize increase energy and improved vitality  Outcome: Ongoing     Problem: Patient Education: Go to Patient Education Activity  Goal: Patient/Family Education  Outcome: Ongoing     Problem: Skin Integrity:  Goal: Will show no infection signs and symptoms  Description: Will show no infection signs and symptoms  Outcome: Ongoing  Goal: Absence of new skin breakdown  Description: Absence of new skin breakdown  Outcome: Ongoing     Problem: Pain:  Goal: Pain level will decrease  Description: Pain level will decrease  Outcome: Ongoing  Goal: Control of acute pain  Description: Control of acute pain  Outcome: Ongoing  Goal: Control of chronic pain  Description: Control of chronic pain  Outcome: Ongoing     Problem: Non-Violent Restraints  Goal: Removal from restraints as soon as assessed to be safe  Outcome: Ongoing  Goal: No harm/injury to patient while restraints in use  Outcome: Ongoing  Goal: Patient's dignity will be maintained  Outcome: Ongoing     Problem: Nutrition  Goal: Optimal nutrition therapy  Outcome: Ongoing

## 2021-09-03 NOTE — FLOWSHEET NOTE
Witnessed time of death with Ashlyn Fox RN at [phone]. Electronically signed by Guillaume Li RN on 9/3/2021 at 11:58 AM    Spoke to Lavern Riley Acceptd donor system at 1200. R/O due to Covid ref # [id number].

## 2021-09-03 NOTE — PROGRESS NOTES
Spiritual Care Services     Summary of Visit:  Pt experienced a compassionate wean today. Son and ex-wife in room at bedside. Son's fiance and her mother and a family friend outside the room. All coping well. Son struggling quietly with grief, very appropriate. Spiritual Assessment/Intervention/Outcomes:    Encounter Summary  Services provided to[de-identified] Patient and family together  Referral/Consult From[de-identified] Rounding  Support System: Children, Family members  Place of Anabaptism: [facility]  Contact Quaker: No  Continue Visiting: Yes  Complexity of Encounter: High  Length of Encounter: 1 hour  Spiritual Assessment Completed: Yes  Routine  Type: Follow up  Assessment: Calm, Approachable, Doubtful  Intervention: Explored feelings, thoughts, concerns, Nurtured hope, Sustaining presence/ Ministry of presence, Discussed belief system/Voodoo practices/margot  Outcome: Expressed gratitude, Engaged in conversation, Receptive, Expressed feelings/needs/concerns     Spiritual/Yarsanism  Type: Spiritual struggle  Assessment: Anticipatory grief  Intervention: Grief care, End of life care  Outcome: Coping, Tearful, Expressed gratitude                    Values / Beliefs  Do you have any ethnic, cultural, sacramental, or spiritual Voodoo needs you would like us to be aware of while you are in the hospital?: No    Care Plan:    Ongoing support. Spiritual Care Services   Electronically signed by Greg Shi on 9/3/21 at 10:56 AM EDT     To reach a  for emotional and spiritual support, place an [facility] consult request.   If a  is needed immediately, dial 0 and ask to page the on-call .

## 2021-09-03 NOTE — PROGRESS NOTES
2000 Pt  Remains unresponsive and on vent. On Levophed, Propofol, Fentanyl drips. OG tube at 59 cm. Placement check with air bolus and gastric contents. 2100  Levophed stopped. Pt has pressure sores on R ear and bottom of R lip. 2136 On e touch- 53.     2141 12.5 Gm D50 IV.     2159 Repeat one touch 99.     0000 Remains in soft wrist restraints bilaterally. 0132 One touch 62.     0134 12.5 Gm D50 IV.    0148 One touch 106.     0200 Dr. Edgar Jensen notified of one touch results. He stated he does not want to change Dr. Roman Leith-Hatfield orders and to notify Dr. Elsa Matamoros in AM.     0600 Remains on Propofol and Fentanyl drips. One touch was 99.

## 2021-09-03 NOTE — PLAN OF CARE
Nutrition Problem #1: Altered nutrition-related lab values  Intervention: Food and/or Nutrient Delivery: Continue NPO  Nutritional Goals: comfort care

## 2021-09-03 NOTE — PROGRESS NOTES
Comprehensive Nutrition Assessment    Type and Reason for Visit:  Reassess    Nutrition Recommendations/Plan:   Continue NPO at this time    Nutrition Assessment:  Pt continues to meet criteria for moderate malntutrition related to acute illness ( COIVD), Family has elected to compassionately wean pt today. Will follow medical plan of care and provide recommendations as appropriate    Malnutrition Assessment:  Malnutrition Status: Moderate malnutrition    Context:  Acute Illness     Findings of the 6 clinical characteristics of malnutrition:  Energy Intake:  7 - 50% or less of estimated energy requirements for 5 or more days  Weight Loss:  1 - 5% over 1 month     Body Fat Loss:  Unable to assess     Muscle Mass Loss:  Unable to assess    Fluid Accumulation:  Unable to assess     Strength:  Not Performed    Estimated Daily Nutrient Needs:  Energy (kcal):  2352-8701 kcals @ 11-14 kca;/kg; Weight Used for Energy Requirements:  Current (110 kg)     Protein (g):  ~ 162 g protein @ 2 g/kg IBW; Weight Used for Protein Requirements:  Ideal (81 kg)        Fluid (ml/day):  ~2268 ml @ 28 m/l/kg IBW; Method Used for Fluid Requirements:  ml/Kg (81 kg)          Wounds:  Skin Tears, Pressure Injury (mouth ear)       Current Nutrition Therapies:    NPO    Anthropometric Measures:  · Height: 6' (182.9 cm)  · Current Body Weight: 247 lb (112 kg) (( 9/1) * edema present)   · Admission Body Weight: 254 lb (115.2 kg)    · Usual Body Weight: 254 lb (115.2 kg) (admit wt)     · Ideal Body Weight: 178 lbs;    · BMI: 33.5  · BMI Categories: Obese Class 1 (BMI 30.0-34. 9)       Nutrition Diagnosis:   · Altered nutrition-related lab values related to endocrine dysfuntion as evidenced by lab values    · Inadequate oral intake related to impaired respiratory function as evidenced by NPO or clear liquid status due to medical condition, intubation    · Moderate malnutrition, In context of acute illness or injury related to increase demand for energy/nutrients as evidenced by weight loss greater than or equal to 5% in 1 month, intake 26-50%    Nutrition Interventions:   Food and/or Nutrient Delivery:  Continue NPO  Nutrition Education/Counseling:  No recommendation at this time   Coordination of Nutrition Care:  No recommendation at this time    Goals:  comfort care       Nutrition Monitoring and Evaluation:   Behavioral-Environmental Outcomes:  None Identified   Food/Nutrient Intake Outcomes:  None Identified  Physical Signs/Symptoms Outcomes:  None Identified     Discharge Planning:    No discharge needs at this time     Electronically signed by Nohemy Garner RD, LD on 9/3/21 at 11:21 AM EDT

## 2021-09-03 NOTE — PROGRESS NOTES
Pulmonary & Critical Care Medicine ICU Progress Note  Chief complaint : Severe acute hypoxic respiratory failure    Subjunctive/24 hour events :   Patient seen and examined during multidisciplinary rounds with RN, charge nurse, RT, pharmacy, dietitian, and social service. On vent, did not open his eyes, did not follow command, is currently sedated, he is off pressors, bowels moving, he is on 45% FiO2 and 8 of PEEP, urine output 2100 cc, no fever, no vomiting, he is tolerating tube feed      Social History     Tobacco Use    Smoking status: Never Smoker    Smokeless tobacco: Never Used   Substance Use Topics    Alcohol use: Never     History reviewed. No pertinent family history. Recent Labs     09/02/21  0450 09/03/21  0442   PHART 7.422 7.399   MKF2HRD 52* 57*   PO2ART 154* 62*       MV Settings:  Vent Mode: AC/VC Rate Set: 30 bmp/Vt Ordered: 480 mL/ /FiO2 : 45 %           IV:      Vitals:  BP (!) 107/56   Pulse 90   Temp 100.4 °F (38 °C) (Bladder)   Resp 20   Ht 6' (1.829 m)   Wt 247 lb 5.7 oz (112.2 kg)   SpO2 99%   BMI 33.55 kg/m²    Tmax:        Intake/Output Summary (Last 24 hours) at 9/3/2021 1053  Last data filed at 9/3/2021 0558  Gross per 24 hour   Intake 2803 ml   Output 2150 ml   Net 653 ml       EXAM:  General: Sedated, no distress, calm  Head: normocephalic, atraumatic  Eyes:No gross abnormalities. ENT:  MMM no lesions,  ET tube and OG tube  Neck:  supple and no masses, left side IJ triple-lumen catheter  Chest : Good air movement, no wheezing, no rales, nontender, tympanic  Heart[de-identified] Heart sounds are normal.  Regular rate and rhythm without murmur, gallop or rub. ABD:  symmetric, soft, non-tender, no guarding or rebound  Musculoskeletal : no cyanosis, no clubbing and no edema  Neuro: Sedated, did not follow commands. Skin: No rashes or nodules noted.   Lymph node:  no cervical nodes  Urology: Yes [facility]   Psychiatric: Sedated on vent    Medications:  Scheduled Meds:   clonazePAM  0.5 mg Oral TID       PRN Meds:  morphine    Results: reviewed by me   CBC:   Recent Labs     09/01/21 0600 09/01/21 0937 09/02/21 0600 09/03/21 0442 09/03/21 0600   WBC 7.5  --  7.0  --  8.0   HGB 11.1*   < > 10.1* 10.1* 10.1*   HCT 32.8*  --  29.6*  --  28.9*   MCV 88.8  --  88.0  --  87.7     --  267  --  310    < > = values in this interval not displayed. BMP:   Recent Labs     09/01/21 0600 09/01/21 0937 09/02/21 0600 09/03/21 0442 09/03/21 0600     --  137  --  139   K 3.0*  3.0*   < > 3.8  3.8  --  2.8*  2.8*   CL 97  --  96  --  96   CO2 33*  --  33*  --  35*   PHOS 2.9  --  3.6  --  3.3   BUN 13  --  16  --  18   CREATININE 0.59*   < > 0.97 1.8* 1.34*    < > = values in this interval not displayed. LIVER PROFILE:   Recent Labs     09/01/21 0600 09/02/21 0600 09/03/21 0600   AST 20 33 25   ALT 24 35 29   BILITOT 0.3 0.3 <0.2   ALKPHOS 111* 125* 118*     PT/INR: No results for input(s): PROTIME, INR in the last 72 hours. APTT: No results for input(s): APTT in the last 72 hours. UA:No results for input(s): NITRITE, COLORU, PHUR, LABCAST, WBCUA, RBCUA, MUCUS, TRICHOMONAS, YEAST, BACTERIA, CLARITYU, SPECGRAV, LEUKOCYTESUR, UROBILINOGEN, BILIRUBINUR, BLOODU, GLUCOSEU, AMORPHOUS in the last 72 hours. Invalid input(s): KETONESU    Cultures:  Sputum culture grows Klebsiella aerogenes   Blood culture grew Staphylococcus lugdunensis      Films:  Chest x-ray today, congested, and bilateral groundglass infiltrative changes. Assessment:   This is a critically ill patient at risk of deterioration / death , needing close ICU monitoring and intervention due to below noted problems     · Severe acute hypoxic respiratory failure  · Severe COVID-19 infection   · Pulmonary emboli due to COVID-19  · Bacterial coinfection, Klebsiella   · Staphylococcus lugdunensis septicemia  · Shock physiology resolved, currently not on pressors  · Hyperglycemia  · Hypercoagulable state    Recommendations  · Vent support lung protective strategy  · head of the bed 30°  · Daily breathing trials and sedation holidays  · Minimize to no sedation if possible  · Watch for ICU delirium: TV on, natural light, avoid benzos, pain control, early mobility, and family engagement  · PUD prophylaxis  · DVT prophylaxis, continue Lovenox therapeutic dose  · Failed breathing trial today  · Patient completed 10 days of dexamethasone  · Antibiotic per ID  · Completed remdesivir treatment   · Target blood sugar 140-180,   · Appreciate endocrinology  · Monitor urine output and renal function  · meatneb TID   · Replace electrolytes          Family meeting was done today morning, patient son requested DNR CC, he requested extubation regardless of the result of the breathing trial, he requested stopping all medication including anticoagulation, antiviral, and all supportive care. He is agreeable to hospice consult. We will proceed with compassionate extubation per patient family request.     Due to the immediate potential for life-threatening deterioration due to acute hypoxic respiratory failure, I spent 35  minutes providing critical care. This time is excluding time spent performing procedures.             Electronically signed by Celine Jeffries MD,  [facility] ,on 9/3/2021 at 10:53 AM

## 2021-09-03 NOTE — PROGRESS NOTES
Progress Note  Date:2021       Room:[place] [address]  Patient Dave Giles     YOB: 1965     Age:56 y.o. Chief complaint new onset type 2 diabetes uncontrolled diabetes  Subjective    Subjective:  Symptoms:  Stable. Diet:  Poor intake (NG tube feeding). Review of Systems   Unable to perform ROS: Mental status change     Objective         Vitals Last 24 Hours:  TEMPERATURE:  Temp  Av.1 °F (36.7 °C)  Min: 97.2 °F (36.2 °C)  Max: 99 °F (37.2 °C)  RESPIRATIONS RANGE: Resp  Av.6  Min: 15  Max: 36  PULSE OXIMETRY RANGE: SpO2  Av.6 %  Min: 87 %  Max: 100 %  PULSE RANGE: Pulse  Av.5  Min: 55  Max: 113  BLOOD PRESSURE RANGE: No data recorded.  ; No data recorded. I/O (24Hr): Intake/Output Summary (Last 24 hours) at 2021 2226  Last data filed at 2021 1614  Gross per 24 hour   Intake 4223 ml   Output 2100 ml   Net 2123 ml     Objective:  General Appearance:  Ill-appearing. Vital signs: (most recent): Blood pressure (!) 107/56, pulse 69, temperature 97.2 °F (36.2 °C), temperature source Bladder, resp. rate 30, height 6' (1.829 m), weight 247 lb 5.7 oz (112.2 kg), SpO2 98 %. Vital signs are normal.      Labs/Imaging/Diagnostics    Labs:  CBC:  Recent Labs     21  0540 21  1041 21  0600 21  0937 21  2253 21  0450 21  0600   WBC 7.6  --  7.5  --   --   --  7.0   RBC 3.86*  --  3.69*  --   --   --  3.37*   HGB 11.3*   < > 11.1*   < > 13.0* 10.4* 10.1*   HCT 34.1*  --  32.8*  --   --   --  29.6*   MCV 88.3  --  88.8  --   --   --  88.0   RDW 13.2  --  13.2  --   --   --  13.4     --  274  --   --   --  267    < > = values in this interval not displayed.      CHEMISTRIES:  Recent Labs     21  0539 21  1041 21  0600 21  0937 21  2253 21  0450 21  0600     --  139  --   --   --  137   K 3.2*  3.2*   < > 3.0*  3.0*  --   --   --  3.8  3.8     --  97  --   --   -- 96   CO2 32*  --  33*  --   --   --  33*   BUN 15  --  13  --   --   --  16   CREATININE 0.57*   < > 0.59*   < > 1.2 1.1 0.97   GLUCOSE 110*  --  134*  --   --   --  284*   PHOS 2.4  --  2.9  --   --   --  3.6   MG 2.1  --  2.0  --   --   --   --     < > = values in this interval not displayed. PT/INR:No results for input(s): PROTIME, INR in the last 72 hours. APTT:No results for input(s): APTT in the last 72 hours. LIVER PROFILE:  Recent Labs     08/31/21  0539 09/01/21  0600 09/02/21  0600   AST 21 20 33   ALT 27 24 35   BILITOT 0.3 0.3 0.3   ALKPHOS 124* 111* 125*       Imaging Last 24 Hours:  XR CHEST PORTABLE    Result Date: 9/2/2021  XR CHEST PORTABLE : 9/2/2021 CLINICAL HISTORY: Respiratory failure. COMPARISON: 9/1/2021. TECHNIQUE: A portable upright AP radiograph of the chest was obtained. FINDINGS: There has been interval removal of a right internal jugular approach central venous catheter from yesterday. Endotracheal, orogastric tubes and a left internal jugular approach central venous catheter remain in expected positions. Moderately extensive patchy pulmonary infiltrates have not significantly changed. There is no sizable pleural effusion, cardiomegaly, vascular congestion, pneumothorax, pneumomediastinum, or other significant changes identified. INTERVAL REMOVAL RIGHT INTERNAL JUGULAR APPROACH CENTRAL VENOUS CATHETER. OTHERWISE, STABLE CHEST FROM YESTERDAY. XR CHEST PORTABLE    Result Date: 9/1/2021  EXAMINATION: XR CHEST PORTABLE CLINICAL HISTORY: LINE PLACEMENT COMPARISONS: SEPTEMBER 1, 2021, 0931 HOURS. FINDINGS: Endotracheal tube 5 cm superior to ej. Nasogastric tube courses beneath diaphragm. Right jugular vein central line tip in junction right atrium and superior vena cava. Left jugular vein central line tip superior vena cava. Patient leaning to right.  Cardiopericardial silhouette normal. Ill-defined areas increase opacity are again found in the right lung, less prominent than prior study. Increased opacity also identified in the left lung, with progression in left midlung zone and no change in the left lower lung. BILATERAL ATELECTASIS/PNEUMONIA VERSUS EDEMA    XR CHEST PORTABLE    Result Date: 9/1/2021  EXAMINATION: XR CHEST PORTABLE CLINICAL HISTORY: RESPIRATORY FAILURE COMPARISONS: AUGUST 30, 2021 FINDINGS: Endotracheal tube 7 cm superior to ej. Nasogastric tube courses beneath diaphragm the stomach. Right jugular vein central line junction right atrium and superior vena cava. Cardiopericardial silhouette enlarged. Osseous structures intact. Diffuse bilateral airspace disease again identified. CARDIOMEGALY. BILATERAL ATELECTASIS/PNEUMONIA VERSUS EDEMA. Assessment//Plan           Hospital Problems         Last Modified POA    Pneumonia due to COVID-19 virus 8/17/2021 Yes    New onset type 2 diabetes mellitus ([facility] [address] 8/10/2021 Yes    Hypoxia 8/10/2021 Yes    Acute respiratory failure with hypoxia ([address] 8/22/2021 Yes    Coagulase negative Staphylococcus bacteremia 8/29/2021 Yes        Assessment:    Condition: In critical condition. Unchanged. (Uncontrolled type 2 diabetes new onset type 2 diabetes  Respiratory failure on vent due to COVID-19 pneumonia  Staph bacteremia). Plan:   (Lower Lantus to 15 units twice a day plus medium dose Humalog coverage discontinue Humalog scheduled 4 times daily  Continue IV antibiotics as per infectious disease  Patient seen through glass window due to COVID-19 protocol  Total time spent 15 minutes).        Electronically signed by Camille Crowedr MD on 9/2/21 at 10:26 PM EDT

## 2021-09-04 NOTE — FLOWSHEET NOTE
Upon cleaning room RN found full bags of fentanyl, precedex, and nimbex. RN called pharmacy who stated to waste medications and dispose of due to patient was covid +.

## 2021-09-09 NOTE — DISCHARGE SUMMARY
CHEST PORTABLE CLINICAL HISTORY: SHORTNESS OF BREATH COMPARISONS: None available. FINDINGS: Patient leaning to left. Osseous structures intact. Cardiopericardial silhouette enlarged. Ill-defined areas of increased opacity are found in the right upper and right lower lung, with air bronchogram in the right lower lung. Air bronchogram increased opacity is also found in the left lower lung, and to lesser degree the left midlung zone. BILATERAL ATELECTASIS/PNEUMONIA. CARDIOMEGALY    US DUP UPPER EXTREMITY RIGHT VENOUS    Result Date: 8/10/2021  EXAMINATION: BILATERAL UPPER EXTREMITY DEEP VENOUS ULTRASOUND WITH DOPPLER IMAGING CLINICAL HISTORY:  Upper extremity swelling COMPARISON: None TECHNIQUE:  Danuta Bellis scale ultrasound with compression maneuvers where accessible, color and spectral Doppler of the internal jugular, subclavian, axillary and brachial veins was performed bilaterally. Grey scale with and without compression of the basilic and cephalic  veins was also performed bilaterally. Images were obtained and stored in a permanent archive. RESULT: Examination limited by intravenous lines.  RIGHT UPPER EXTREMITY DEEP VEINS: Internal Jugular vein: Normal compression, normal spontaneous flow, Subclavian vein:  Normal, spontaneous flow, Axillary vein:  Normal compression, normal spontaneous flow, Brachial vein:  Normal compression, normal spontaneous flow, RIGHT UPPER EXTREMITY SUPERFICIAL VEINS: Basilic vein: Normal compression Cephalic vein:  Normal compression LEFT UPPER EXTREMITY DEEP VEINS: Internal Jugular vein: Normal compression, normal spontaneous flow, Subclavian vein:  Normal, spontaneous flow, Axillary vein:  Normal compression, normal spontaneous flow, Brachial vein:  Normal compression, normal spontaneous flow, LEFT UPPER EXTREMITY SUPERFICIAL VEINS: Basilic vein: Normal compression Cephalic vein:  Normal compression EXAMINATION: BILATERAL LOWER EXTREMITY DEEP VENOUS ULTRASOUND WITH DOPPLER IMAGING CLINICAL HISTORY: SWELLING COMPARISON: None TECHNIQUE:  Henrene Miss scale with compression maneuvers, Color Doppler and Spectral Doppler at rest and with augmentation of the distal external iliac, common femoral, femoral and popliteal veins was performed. Grey scale with compression maneuvers of the peroneal and posterior tibial veins was performed. The great and small saphenous veins were imaged in grey scale with compression maneuvers at their insertion to the deep system. Imaging was performed in both lower extremities. Images were obtained and stored in a permanent archive. RESULT: RIGHT LOWER EXTREMITY PROXIMAL DEEP VEINS Distal External Iliac and Common Femoral Veins:      Compression: Normal      Doppler: Normal, spontaneous respirophasic flow                     Normal response to augmentation  Femoral vein:      Compression: Normal      Doppler: Normal, spontaneous flow                     Normal response to augmentation Popliteal vein:      Compression: Normal      Doppler: Normal, spontaneous flow                     Normal response to augmentation CALF DEEP VEINS Peroneal veins: Normal compression Posterior tibial veins: Normal compression Gastrocnemius and Soleal veins: Not imaged SUPERFICIAL VEINS Great saphenous: Patent and compressible at insertion into common femoral vein; not otherwise assessed. Small Saphenous:Patent and compressible in the proximal calf, not otherwise assessed.  LEFT LOWER EXTREMITY PROXIMAL DEEP VEINS Distal External Iliac and Common Femoral Veins:      Compression: Normal      Doppler: Normal, spontaneous respirophasic flow                      Normal response to augmentation Femoral vein:      Compression: Normal      Doppler: Normal, spontaneous flow                      Normal response to augmentation  Popliteal vein:      Compression: Normal      Doppler: Normal, spontaneous flow                      Normal response to augmentation CALF DEEP VEINS Peroneal veins: Normal compression Posterior tibial veins: Normal compression Gastrocnemius and Soleal veins: Not imaged SUPERFICIAL VEINS Great saphenous: Patent at insertion into common femoral vein; not otherwise assessed. Small Saphenous: Patent and compressible in the proximal calf, not otherwise assessed. NEGATIVE STUDY FOR ACUTE DVT IN THE RIGHT AND LEFT UPPER EXTREMITIES. NEGATIVE STUDY FOR SUPERFICIAL THROMBOPHLEBITIS IN THE RIGHT AND LEFT  UPPER EXTREMITIES. LIMITED EVALUATION OF THE CALF DUE TO TO PATIENT BODY HABITUS. NEGATIVE STUDY FOR ACUTE PROXIMAL DVT IN THE RIGHT AND LEFT LOWER EXTREMITIES. NEGATIVE STUDY FOR ACUTE CALF DVT IN THE RIGHT AND LEFT LOWER EXTREMITIES. NEGATIVE STUDY FOR SUPERFICIAL THROMBOPHLEBITIS IN THE RIGHT AND LEFT LOWER EXTREMITIES. US DUP UPPER EXTREMITY LEFT VENOUS    Result Date: 8/10/2021  EXAMINATION: BILATERAL UPPER EXTREMITY DEEP VENOUS ULTRASOUND WITH DOPPLER IMAGING CLINICAL HISTORY:  Upper extremity swelling COMPARISON: None TECHNIQUE:  Rajani Jose scale ultrasound with compression maneuvers where accessible, color and spectral Doppler of the internal jugular, subclavian, axillary and brachial veins was performed bilaterally. Grey scale with and without compression of the basilic and cephalic  veins was also performed bilaterally. Images were obtained and stored in a permanent archive. RESULT: Examination limited by intravenous lines.  RIGHT UPPER EXTREMITY DEEP VEINS: Internal Jugular vein: Normal compression, normal spontaneous flow, Subclavian vein:  Normal, spontaneous flow, Axillary vein:  Normal compression, normal spontaneous flow, Brachial vein:  Normal compression, normal spontaneous flow, RIGHT UPPER EXTREMITY SUPERFICIAL VEINS: Basilic vein: Normal compression Cephalic vein:  Normal compression LEFT UPPER EXTREMITY DEEP VEINS: Internal Jugular vein: Normal compression, normal spontaneous flow, Subclavian vein:  Normal, spontaneous flow, Axillary vein:  Normal compression, normal spontaneous flow, Brachial vein:  Normal compression, normal spontaneous flow, LEFT UPPER EXTREMITY SUPERFICIAL VEINS: Basilic vein: Normal compression Cephalic vein:  Normal compression EXAMINATION: BILATERAL LOWER EXTREMITY DEEP VENOUS ULTRASOUND WITH DOPPLER IMAGING CLINICAL HISTORY: SWELLING COMPARISON: None TECHNIQUE:  Gray scale with compression maneuvers, Color Doppler and Spectral Doppler at rest and with augmentation of the distal external iliac, common femoral, femoral and popliteal veins was performed. Grey scale with compression maneuvers of the peroneal and posterior tibial veins was performed. The great and small saphenous veins were imaged in grey scale with compression maneuvers at their insertion to the deep system. Imaging was performed in both lower extremities. Images were obtained and stored in a permanent archive. RESULT: RIGHT LOWER EXTREMITY PROXIMAL DEEP VEINS Distal External Iliac and Common Femoral Veins:      Compression: Normal      Doppler: Normal, spontaneous respirophasic flow                     Normal response to augmentation  Femoral vein:      Compression: Normal      Doppler: Normal, spontaneous flow                     Normal response to augmentation Popliteal vein:      Compression: Normal      Doppler: Normal, spontaneous flow                     Normal response to augmentation CALF DEEP VEINS Peroneal veins: Normal compression Posterior tibial veins: Normal compression Gastrocnemius and Soleal veins: Not imaged SUPERFICIAL VEINS Great saphenous: Patent and compressible at insertion into common femoral vein; not otherwise assessed. Small Saphenous:Patent and compressible in the proximal calf, not otherwise assessed.  LEFT LOWER EXTREMITY PROXIMAL DEEP VEINS Distal External Iliac and Common Femoral Veins:      Compression: Normal      Doppler: Normal, spontaneous respirophasic flow                      Normal response to augmentation Femoral vein:      Compression: Normal Doppler: Normal, spontaneous flow                      Normal response to augmentation  Popliteal vein:      Compression: Normal      Doppler: Normal, spontaneous flow                      Normal response to augmentation CALF DEEP VEINS Peroneal veins: Normal compression Posterior tibial veins: Normal compression Gastrocnemius and Soleal veins: Not imaged SUPERFICIAL VEINS Great saphenous: Patent at insertion into common femoral vein; not otherwise assessed. Small Saphenous: Patent and compressible in the proximal calf, not otherwise assessed. NEGATIVE STUDY FOR ACUTE DVT IN THE RIGHT AND LEFT UPPER EXTREMITIES. NEGATIVE STUDY FOR SUPERFICIAL THROMBOPHLEBITIS IN THE RIGHT AND LEFT  UPPER EXTREMITIES. LIMITED EVALUATION OF THE CALF DUE TO TO PATIENT BODY HABITUS. NEGATIVE STUDY FOR ACUTE PROXIMAL DVT IN THE RIGHT AND LEFT LOWER EXTREMITIES. NEGATIVE STUDY FOR ACUTE CALF DVT IN THE RIGHT AND LEFT LOWER EXTREMITIES. NEGATIVE STUDY FOR SUPERFICIAL THROMBOPHLEBITIS IN THE RIGHT AND LEFT LOWER EXTREMITIES. US DUP LOWER EXTREMITIES BILATERAL VENOUS    Result Date: 8/10/2021  EXAMINATION: BILATERAL UPPER EXTREMITY DEEP VENOUS ULTRASOUND WITH DOPPLER IMAGING CLINICAL HISTORY:  Upper extremity swelling COMPARISON: None TECHNIQUE:  Eleni Silva scale ultrasound with compression maneuvers where accessible, color and spectral Doppler of the internal jugular, subclavian, axillary and brachial veins was performed bilaterally. Grey scale with and without compression of the basilic and cephalic  veins was also performed bilaterally. Images were obtained and stored in a permanent archive. RESULT: Examination limited by intravenous lines.  RIGHT UPPER EXTREMITY DEEP VEINS: Internal Jugular vein: Normal compression, normal spontaneous flow, Subclavian vein:  Normal, spontaneous flow, Axillary vein:  Normal compression, normal spontaneous flow, Brachial vein:  Normal compression, normal spontaneous flow, RIGHT UPPER EXTREMITY SUPERFICIAL VEINS: Basilic vein: Normal compression Cephalic vein:  Normal compression LEFT UPPER EXTREMITY DEEP VEINS: Internal Jugular vein: Normal compression, normal spontaneous flow, Subclavian vein:  Normal, spontaneous flow, Axillary vein:  Normal compression, normal spontaneous flow, Brachial vein:  Normal compression, normal spontaneous flow, LEFT UPPER EXTREMITY SUPERFICIAL VEINS: Basilic vein: Normal compression Cephalic vein:  Normal compression EXAMINATION: BILATERAL LOWER EXTREMITY DEEP VENOUS ULTRASOUND WITH DOPPLER IMAGING CLINICAL HISTORY: SWELLING COMPARISON: None TECHNIQUE:  Gray scale with compression maneuvers, Color Doppler and Spectral Doppler at rest and with augmentation of the distal external iliac, common femoral, femoral and popliteal veins was performed. Grey scale with compression maneuvers of the peroneal and posterior tibial veins was performed. The great and small saphenous veins were imaged in grey scale with compression maneuvers at their insertion to the deep system. Imaging was performed in both lower extremities. Images were obtained and stored in a permanent archive. RESULT: RIGHT LOWER EXTREMITY PROXIMAL DEEP VEINS Distal External Iliac and Common Femoral Veins:      Compression: Normal      Doppler: Normal, spontaneous respirophasic flow                     Normal response to augmentation  Femoral vein:      Compression: Normal      Doppler: Normal, spontaneous flow                     Normal response to augmentation Popliteal vein:      Compression: Normal      Doppler: Normal, spontaneous flow                     Normal response to augmentation CALF DEEP VEINS Peroneal veins: Normal compression Posterior tibial veins: Normal compression Gastrocnemius and Soleal veins: Not imaged SUPERFICIAL VEINS Great saphenous: Patent and compressible at insertion into common femoral vein; not otherwise assessed.  Small Saphenous:Patent and compressible in the proximal calf, not otherwise assessed. LEFT LOWER EXTREMITY PROXIMAL DEEP VEINS Distal External Iliac and Common Femoral Veins:      Compression: Normal      Doppler: Normal, spontaneous respirophasic flow                      Normal response to augmentation Femoral vein:      Compression: Normal      Doppler: Normal, spontaneous flow                      Normal response to augmentation  Popliteal vein:      Compression: Normal      Doppler: Normal, spontaneous flow                      Normal response to augmentation CALF DEEP VEINS Peroneal veins: Normal compression Posterior tibial veins: Normal compression Gastrocnemius and Soleal veins: Not imaged SUPERFICIAL VEINS Great saphenous: Patent at insertion into common femoral vein; not otherwise assessed. Small Saphenous: Patent and compressible in the proximal calf, not otherwise assessed. NEGATIVE STUDY FOR ACUTE DVT IN THE RIGHT AND LEFT UPPER EXTREMITIES. NEGATIVE STUDY FOR SUPERFICIAL THROMBOPHLEBITIS IN THE RIGHT AND LEFT  UPPER EXTREMITIES. LIMITED EVALUATION OF THE CALF DUE TO TO PATIENT BODY HABITUS. NEGATIVE STUDY FOR ACUTE PROXIMAL DVT IN THE RIGHT AND LEFT LOWER EXTREMITIES. NEGATIVE STUDY FOR ACUTE CALF DVT IN THE RIGHT AND LEFT LOWER EXTREMITIES. NEGATIVE STUDY FOR SUPERFICIAL THROMBOPHLEBITIS IN THE RIGHT AND LEFT LOWER EXTREMITIES. Discharge Medications:       Farhan Chambers   Home Medication Instructions QTL:[id number]    Printed on:21 1547   Medication Information                      aspirin 81 MG EC tablet  Take 81 mg by mouth daily             Naproxen Sodium (ALEVE) 220 MG CAPS  Take 220 mg by mouth as needed for Pain                 Disposition:        Condition at discharge:      Activity: Not applicable.     Total time taken for discharging this patient: < 30 min       Signed:  Rigoberto Fink DO  2021, 3:43 PM  ----------------------------------------------------------------------------------------------------------------------    Lisandra Seek,